# Patient Record
Sex: MALE | Race: WHITE | Employment: FULL TIME | URBAN - METROPOLITAN AREA
[De-identification: names, ages, dates, MRNs, and addresses within clinical notes are randomized per-mention and may not be internally consistent; named-entity substitution may affect disease eponyms.]

---

## 2017-01-11 ENCOUNTER — OFFICE VISIT (OUTPATIENT)
Dept: INTERNAL MEDICINE CLINIC | Age: 62
End: 2017-01-11

## 2017-01-11 VITALS
HEIGHT: 74 IN | TEMPERATURE: 98.1 F | SYSTOLIC BLOOD PRESSURE: 136 MMHG | OXYGEN SATURATION: 97 % | DIASTOLIC BLOOD PRESSURE: 76 MMHG | WEIGHT: 218 LBS | BODY MASS INDEX: 27.98 KG/M2 | HEART RATE: 116 BPM

## 2017-01-11 DIAGNOSIS — J01.90 ACUTE NON-RECURRENT SINUSITIS, UNSPECIFIED LOCATION: Primary | ICD-10-CM

## 2017-01-11 DIAGNOSIS — I10 ESSENTIAL HYPERTENSION: ICD-10-CM

## 2017-01-11 DIAGNOSIS — J20.9 BRONCHITIS WITH BRONCHOSPASM: ICD-10-CM

## 2017-01-11 DIAGNOSIS — E11.9 TYPE 2 DIABETES MELLITUS WITHOUT COMPLICATION, WITHOUT LONG-TERM CURRENT USE OF INSULIN (HCC): ICD-10-CM

## 2017-01-11 PROCEDURE — 99214 OFFICE O/P EST MOD 30 MIN: CPT | Performed by: NURSE PRACTITIONER

## 2017-01-11 RX ORDER — PREDNISONE 10 MG/1
TABLET ORAL
Qty: 39 TABLET | Refills: 0 | Status: SHIPPED | OUTPATIENT
Start: 2017-01-11 | End: 2018-01-02 | Stop reason: ALTCHOICE

## 2017-01-11 RX ORDER — AMOXICILLIN AND CLAVULANATE POTASSIUM 875; 125 MG/1; MG/1
1 TABLET, FILM COATED ORAL 2 TIMES DAILY
Qty: 20 TABLET | Refills: 0 | Status: SHIPPED | OUTPATIENT
Start: 2017-01-11 | End: 2017-01-21

## 2017-01-11 RX ORDER — ALBUTEROL SULFATE 90 UG/1
2 AEROSOL, METERED RESPIRATORY (INHALATION) EVERY 6 HOURS PRN
Qty: 1 INHALER | Refills: 3 | Status: SHIPPED | OUTPATIENT
Start: 2017-01-11 | End: 2019-03-26 | Stop reason: ALTCHOICE

## 2017-01-11 RX ORDER — GLIPIZIDE 5 MG/1
10 TABLET ORAL DAILY
COMMUNITY
Start: 2014-07-14 | End: 2022-05-16 | Stop reason: SDUPTHER

## 2017-01-11 ASSESSMENT — ENCOUNTER SYMPTOMS
COLOR CHANGE: 0
RHINORRHEA: 1
SHORTNESS OF BREATH: 0
CONSTIPATION: 0
COUGH: 1
WHEEZING: 1
EYES NEGATIVE: 1
SORE THROAT: 0
BLOOD IN STOOL: 0
DIARRHEA: 0
BACK PAIN: 0

## 2017-01-16 ENCOUNTER — HOSPITAL ENCOUNTER (OUTPATIENT)
Dept: OTHER | Age: 62
Discharge: HOME | End: 2017-01-16
Attending: INTERNAL MEDICINE | Admitting: INTERNAL MEDICINE

## 2017-01-16 ENCOUNTER — TELEPHONE (OUTPATIENT)
Dept: INTERNAL MEDICINE CLINIC | Age: 62
End: 2017-01-16

## 2017-01-16 ENCOUNTER — OFFICE VISIT (OUTPATIENT)
Dept: INTERNAL MEDICINE CLINIC | Age: 62
End: 2017-01-16

## 2017-01-16 VITALS
OXYGEN SATURATION: 98 % | DIASTOLIC BLOOD PRESSURE: 70 MMHG | WEIGHT: 218 LBS | BODY MASS INDEX: 27.98 KG/M2 | SYSTOLIC BLOOD PRESSURE: 144 MMHG | HEART RATE: 127 BPM | HEIGHT: 74 IN | TEMPERATURE: 98.1 F

## 2017-01-16 DIAGNOSIS — R05.9 COUGH: ICD-10-CM

## 2017-01-16 DIAGNOSIS — I10 ESSENTIAL HYPERTENSION: ICD-10-CM

## 2017-01-16 DIAGNOSIS — J20.9 ACUTE BRONCHITIS, UNSPECIFIED ORGANISM: ICD-10-CM

## 2017-01-16 DIAGNOSIS — R05.9 COUGH: Primary | ICD-10-CM

## 2017-01-16 DIAGNOSIS — E11.9 TYPE 2 DIABETES MELLITUS WITHOUT COMPLICATION, WITHOUT LONG-TERM CURRENT USE OF INSULIN (HCC): ICD-10-CM

## 2017-01-16 PROCEDURE — 99213 OFFICE O/P EST LOW 20 MIN: CPT | Performed by: INTERNAL MEDICINE

## 2017-01-16 RX ORDER — GUAIFENESIN AND CODEINE PHOSPHATE 100; 10 MG/5ML; MG/5ML
5 SOLUTION ORAL 3 TIMES DAILY PRN
Qty: 120 BOTTLE | Refills: 0 | Status: SHIPPED | OUTPATIENT
Start: 2017-01-16 | End: 2020-05-01 | Stop reason: CLARIF

## 2017-01-16 RX ORDER — LEVOFLOXACIN 750 MG/1
750 TABLET ORAL DAILY
Qty: 10 TABLET | Refills: 0 | Status: SHIPPED | OUTPATIENT
Start: 2017-01-16 | End: 2017-01-26

## 2017-10-02 LAB
AVERAGE GLUCOSE: NORMAL
HBA1C MFR BLD: 10.6 %

## 2017-11-29 ENCOUNTER — TELEPHONE (OUTPATIENT)
Dept: INTERNAL MEDICINE CLINIC | Age: 62
End: 2017-11-29

## 2018-01-02 ENCOUNTER — OFFICE VISIT (OUTPATIENT)
Dept: INTERNAL MEDICINE CLINIC | Age: 63
End: 2018-01-02

## 2018-01-02 VITALS
SYSTOLIC BLOOD PRESSURE: 124 MMHG | TEMPERATURE: 98.5 F | DIASTOLIC BLOOD PRESSURE: 82 MMHG | HEART RATE: 114 BPM | BODY MASS INDEX: 27.48 KG/M2 | WEIGHT: 214 LBS | OXYGEN SATURATION: 98 %

## 2018-01-02 DIAGNOSIS — J02.8 PHARYNGITIS DUE TO OTHER ORGANISM: Primary | ICD-10-CM

## 2018-01-02 DIAGNOSIS — I10 ESSENTIAL HYPERTENSION: ICD-10-CM

## 2018-01-02 DIAGNOSIS — E11.9 TYPE 2 DIABETES MELLITUS WITHOUT COMPLICATION, WITHOUT LONG-TERM CURRENT USE OF INSULIN (HCC): ICD-10-CM

## 2018-01-02 DIAGNOSIS — E78.5 HYPERLIPIDEMIA, UNSPECIFIED HYPERLIPIDEMIA TYPE: ICD-10-CM

## 2018-01-02 PROCEDURE — 99213 OFFICE O/P EST LOW 20 MIN: CPT | Performed by: INTERNAL MEDICINE

## 2018-01-02 RX ORDER — ATORVASTATIN CALCIUM 20 MG/1
TABLET, FILM COATED ORAL
COMMUNITY
Start: 2017-10-05 | End: 2022-05-16 | Stop reason: SDUPTHER

## 2018-01-02 RX ORDER — AZITHROMYCIN 250 MG/1
TABLET, FILM COATED ORAL
Qty: 1 PACKET | Refills: 0 | Status: SHIPPED | OUTPATIENT
Start: 2018-01-02 | End: 2018-01-12

## 2018-01-02 ASSESSMENT — ENCOUNTER SYMPTOMS
VOMITING: 0
CHEST TIGHTNESS: 0
CONSTIPATION: 0
ABDOMINAL DISTENTION: 0
SHORTNESS OF BREATH: 0
SINUS PRESSURE: 0
BACK PAIN: 0
SINUS PAIN: 0
WHEEZING: 0
SORE THROAT: 1
COUGH: 0
NAUSEA: 0
DIARRHEA: 0

## 2018-01-02 ASSESSMENT — PATIENT HEALTH QUESTIONNAIRE - PHQ9
SUM OF ALL RESPONSES TO PHQ QUESTIONS 1-9: 0
1. LITTLE INTEREST OR PLEASURE IN DOING THINGS: 0
2. FEELING DOWN, DEPRESSED OR HOPELESS: 0
SUM OF ALL RESPONSES TO PHQ9 QUESTIONS 1 & 2: 0

## 2018-01-02 NOTE — PROGRESS NOTES
Subjective:      Patient ID: Luis Dunlap is a 63 y.o. male. HPI    Here with c/o 2 week h/o sore throat and chest congestion. Pt also has a h/o dm, hyperlipidemia and htn. No relief with sore throat after taking apap. Blood sugars and lipids poorly controlled. Being followed by endocrine. htn  Has improved since starting bp medication    Review of Systems   Constitutional: Negative for fever and unexpected weight change. HENT: Positive for congestion and sore throat. Negative for sinus pain, sinus pressure and sneezing. Respiratory: Negative for cough, chest tightness, shortness of breath and wheezing. Cardiovascular: Negative for chest pain, palpitations and leg swelling. Gastrointestinal: Negative for abdominal distention, constipation, diarrhea, nausea and vomiting. Musculoskeletal: Negative for arthralgias, back pain and myalgias. Neurological: Negative for tremors and numbness. All other systems reviewed and are negative. Objective:   Physical Exam   Constitutional: He is oriented to person, place, and time. He appears well-developed and well-nourished. No distress. HENT:   Right Ear: External ear normal.   Left Ear: External ear normal.   Nose: Nose normal.   Mouth/Throat: Oropharynx is clear and moist. No oropharyngeal exudate. Neck: Neck supple. No thyromegaly present. Cardiovascular: Normal rate, regular rhythm, normal heart sounds and intact distal pulses. Exam reveals no gallop and no friction rub. No murmur heard. Pulmonary/Chest: Effort normal and breath sounds normal. No respiratory distress. He has no wheezes. He has no rales. He exhibits no tenderness. Abdominal: Soft. He exhibits no distension and no mass. There is no tenderness. There is no rebound and no guarding. Musculoskeletal: He exhibits no edema. Neurological: He is alert and oriented to person, place, and time. Skin: He is not diaphoretic. Vitals reviewed.       Assessment:     Latrell Georges was

## 2018-02-26 ENCOUNTER — OFFICE VISIT (OUTPATIENT)
Dept: INTERNAL MEDICINE CLINIC | Age: 63
End: 2018-02-26

## 2018-02-26 VITALS
BODY MASS INDEX: 27.73 KG/M2 | HEART RATE: 110 BPM | OXYGEN SATURATION: 98 % | DIASTOLIC BLOOD PRESSURE: 88 MMHG | WEIGHT: 216 LBS | SYSTOLIC BLOOD PRESSURE: 138 MMHG

## 2018-02-26 DIAGNOSIS — E78.5 HYPERLIPIDEMIA, UNSPECIFIED HYPERLIPIDEMIA TYPE: ICD-10-CM

## 2018-02-26 DIAGNOSIS — I10 ESSENTIAL HYPERTENSION: ICD-10-CM

## 2018-02-26 DIAGNOSIS — Z23 NEED FOR PROPHYLACTIC VACCINATION AGAINST STREPTOCOCCUS PNEUMONIAE (PNEUMOCOCCUS): ICD-10-CM

## 2018-02-26 DIAGNOSIS — L05.91 CYST NEAR TAILBONE: Primary | ICD-10-CM

## 2018-02-26 DIAGNOSIS — E11.9 TYPE 2 DIABETES MELLITUS WITHOUT COMPLICATION, WITHOUT LONG-TERM CURRENT USE OF INSULIN (HCC): ICD-10-CM

## 2018-02-26 PROCEDURE — 99214 OFFICE O/P EST MOD 30 MIN: CPT | Performed by: INTERNAL MEDICINE

## 2018-02-26 RX ORDER — SULFAMETHOXAZOLE AND TRIMETHOPRIM 800; 160 MG/1; MG/1
1 TABLET ORAL 2 TIMES DAILY
Qty: 20 TABLET | Refills: 0 | Status: SHIPPED | OUTPATIENT
Start: 2018-02-26 | End: 2018-03-08

## 2018-02-26 ASSESSMENT — ENCOUNTER SYMPTOMS
COUGH: 0
VOMITING: 0
SHORTNESS OF BREATH: 0
CHEST TIGHTNESS: 0
ABDOMINAL DISTENTION: 0
NAUSEA: 0
WHEEZING: 0
CONSTIPATION: 0
BACK PAIN: 0
DIARRHEA: 0

## 2018-02-26 NOTE — PROGRESS NOTES
pain.    Diagnoses and all orders for this visit:    Cyst near tailbone  Warm, moist compressed 3 times daily for 15 minutes. Discussed use, benefit, and side effects of prescribed medications. Barriers to compliance discussed. All patient questions answered. Pt voiced understanding. Consider surgery referral  -     sulfamethoxazole-trimethoprim (BACTRIM DS) 800-160 MG per tablet; Take 1 tablet by mouth 2 times daily for 10 days    Type 2 diabetes mellitus without complication, without long-term current use of insulin (HCC)  Poorly controlled. Will obtain labs  -     Hemoglobin A1C    Essential hypertension  Stable. Continue current regimen    -     Comprehensive Metabolic Panel    Hyperlipidemia, unspecified hyperlipidemia type  Elevated last check.  Continue current regimen    -     Lipid Panel  -     Comprehensive Metabolic Panel        Plan:      See above plan

## 2019-01-07 LAB
AVERAGE GLUCOSE: NORMAL
HBA1C MFR BLD: 9.5 %

## 2019-01-09 ENCOUNTER — OFFICE VISIT (OUTPATIENT)
Dept: INTERNAL MEDICINE CLINIC | Age: 64
End: 2019-01-09
Payer: COMMERCIAL

## 2019-01-09 VITALS
OXYGEN SATURATION: 98 % | DIASTOLIC BLOOD PRESSURE: 66 MMHG | HEIGHT: 74 IN | WEIGHT: 210 LBS | TEMPERATURE: 97.9 F | SYSTOLIC BLOOD PRESSURE: 126 MMHG | BODY MASS INDEX: 26.95 KG/M2 | HEART RATE: 114 BPM

## 2019-01-09 DIAGNOSIS — K52.9 GASTROENTERITIS: ICD-10-CM

## 2019-01-09 DIAGNOSIS — R42 DIZZINESS: ICD-10-CM

## 2019-01-09 DIAGNOSIS — J01.10 ACUTE FRONTAL SINUSITIS, RECURRENCE NOT SPECIFIED: Primary | ICD-10-CM

## 2019-01-09 DIAGNOSIS — R11.0 NAUSEA: ICD-10-CM

## 2019-01-09 DIAGNOSIS — R19.7 DIARRHEA, UNSPECIFIED TYPE: ICD-10-CM

## 2019-01-09 DIAGNOSIS — R52 BODY ACHES: ICD-10-CM

## 2019-01-09 DIAGNOSIS — R50.9 FEVER CHILLS: ICD-10-CM

## 2019-01-09 LAB
INFLUENZA A ANTIBODY: NORMAL
INFLUENZA B ANTIBODY: NORMAL

## 2019-01-09 PROCEDURE — 87804 INFLUENZA ASSAY W/OPTIC: CPT | Performed by: NURSE PRACTITIONER

## 2019-01-09 PROCEDURE — 99213 OFFICE O/P EST LOW 20 MIN: CPT | Performed by: NURSE PRACTITIONER

## 2019-01-09 RX ORDER — ONDANSETRON 4 MG/1
4 TABLET, FILM COATED ORAL 3 TIMES DAILY PRN
Qty: 30 TABLET | Refills: 0 | Status: ON HOLD
Start: 2019-01-09 | End: 2020-05-10 | Stop reason: HOSPADM

## 2019-01-09 RX ORDER — AMOXICILLIN AND CLAVULANATE POTASSIUM 875; 125 MG/1; MG/1
1 TABLET, FILM COATED ORAL 2 TIMES DAILY
Qty: 20 TABLET | Refills: 0 | Status: SHIPPED | OUTPATIENT
Start: 2019-01-09 | End: 2019-01-19

## 2019-01-09 ASSESSMENT — ENCOUNTER SYMPTOMS
DIARRHEA: 1
BLOOD IN STOOL: 0
WHEEZING: 1
ABDOMINAL PAIN: 1
SHORTNESS OF BREATH: 0
NAUSEA: 1
CONSTIPATION: 0
VOMITING: 0
COUGH: 1

## 2019-01-09 ASSESSMENT — PATIENT HEALTH QUESTIONNAIRE - PHQ9
SUM OF ALL RESPONSES TO PHQ QUESTIONS 1-9: 0
SUM OF ALL RESPONSES TO PHQ9 QUESTIONS 1 & 2: 0
1. LITTLE INTEREST OR PLEASURE IN DOING THINGS: 0
2. FEELING DOWN, DEPRESSED OR HOPELESS: 0
SUM OF ALL RESPONSES TO PHQ QUESTIONS 1-9: 0

## 2019-03-15 ENCOUNTER — HOSPITAL ENCOUNTER (OUTPATIENT)
Dept: PHYSICAL THERAPY | Age: 64
Setting detail: THERAPIES SERIES
Discharge: HOME | End: 2019-03-15
Payer: COMMERCIAL

## 2019-03-15 PROCEDURE — 97110 THERAPEUTIC EXERCISES: CPT

## 2019-03-15 PROCEDURE — 97140 MANUAL THERAPY 1/> REGIONS: CPT

## 2019-03-15 PROCEDURE — 97162 PT EVAL MOD COMPLEX 30 MIN: CPT

## 2019-03-18 ENCOUNTER — APPOINTMENT (OUTPATIENT)
Dept: PHYSICAL THERAPY | Age: 64
End: 2019-03-18
Payer: COMMERCIAL

## 2019-03-20 ENCOUNTER — HOSPITAL ENCOUNTER (OUTPATIENT)
Dept: PHYSICAL THERAPY | Age: 64
Setting detail: THERAPIES SERIES
Discharge: HOME | End: 2019-03-20
Payer: COMMERCIAL

## 2019-03-20 PROCEDURE — 97140 MANUAL THERAPY 1/> REGIONS: CPT

## 2019-03-20 PROCEDURE — 97110 THERAPEUTIC EXERCISES: CPT

## 2019-03-22 ENCOUNTER — HOSPITAL ENCOUNTER (OUTPATIENT)
Dept: PHYSICAL THERAPY | Age: 64
Setting detail: THERAPIES SERIES
Discharge: HOME | End: 2019-03-22
Payer: COMMERCIAL

## 2019-03-22 PROCEDURE — 97140 MANUAL THERAPY 1/> REGIONS: CPT

## 2019-03-25 ENCOUNTER — HOSPITAL ENCOUNTER (OUTPATIENT)
Dept: PHYSICAL THERAPY | Age: 64
Setting detail: THERAPIES SERIES
Discharge: HOME | End: 2019-03-25
Payer: COMMERCIAL

## 2019-03-25 PROCEDURE — 97140 MANUAL THERAPY 1/> REGIONS: CPT

## 2019-03-25 PROCEDURE — G0283 ELEC STIM OTHER THAN WOUND: HCPCS

## 2019-03-26 ENCOUNTER — OFFICE VISIT (OUTPATIENT)
Dept: INTERNAL MEDICINE CLINIC | Age: 64
End: 2019-03-26
Payer: COMMERCIAL

## 2019-03-26 VITALS
DIASTOLIC BLOOD PRESSURE: 78 MMHG | SYSTOLIC BLOOD PRESSURE: 142 MMHG | HEART RATE: 112 BPM | OXYGEN SATURATION: 98 % | WEIGHT: 217 LBS | BODY MASS INDEX: 27.86 KG/M2

## 2019-03-26 DIAGNOSIS — R00.0 TACHYCARDIA: Primary | ICD-10-CM

## 2019-03-26 DIAGNOSIS — I10 ESSENTIAL HYPERTENSION: ICD-10-CM

## 2019-03-26 PROCEDURE — 99213 OFFICE O/P EST LOW 20 MIN: CPT | Performed by: INTERNAL MEDICINE

## 2019-03-26 ASSESSMENT — ENCOUNTER SYMPTOMS
DIARRHEA: 0
COUGH: 0
BACK PAIN: 0
NAUSEA: 0
VOMITING: 0
ABDOMINAL DISTENTION: 0
SHORTNESS OF BREATH: 0
WHEEZING: 0
CHEST TIGHTNESS: 0
CONSTIPATION: 0

## 2019-03-27 ENCOUNTER — APPOINTMENT (OUTPATIENT)
Dept: PHYSICAL THERAPY | Age: 64
End: 2019-03-27
Payer: COMMERCIAL

## 2019-03-29 ENCOUNTER — HOSPITAL ENCOUNTER (OUTPATIENT)
Dept: PHYSICAL THERAPY | Age: 64
Setting detail: THERAPIES SERIES
Discharge: HOME | End: 2019-03-29
Payer: COMMERCIAL

## 2019-03-29 PROCEDURE — 97140 MANUAL THERAPY 1/> REGIONS: CPT

## 2019-04-01 ENCOUNTER — APPOINTMENT (OUTPATIENT)
Dept: PHYSICAL THERAPY | Age: 64
End: 2019-04-01
Payer: COMMERCIAL

## 2019-04-03 ENCOUNTER — HOSPITAL ENCOUNTER (OUTPATIENT)
Dept: PHYSICAL THERAPY | Age: 64
Setting detail: THERAPIES SERIES
Discharge: HOME | End: 2019-04-03
Payer: COMMERCIAL

## 2019-04-03 NOTE — FLOWSHEET NOTE
Physical Therapy  Cancellation/No-show Note  Patient Name:  Clare Caballero  :  1955   Date:  4/3/2019  Cancels to Date: 1  No-shows to Date: 0    For today's appointment patient:  [x]  Cancelled  []  Rescheduled appointment  []  No-show     Reason given by patient:  []  Patient ill  []  Conflicting appointment  []  No transportation    []  Conflict with work  []  No reason given  []  Other:     Comments:      Electronically signed by:  Penny Kwan, [id number]

## 2019-04-05 ENCOUNTER — HOSPITAL ENCOUNTER (OUTPATIENT)
Dept: PHYSICAL THERAPY | Age: 64
Setting detail: THERAPIES SERIES
Discharge: HOME | End: 2019-04-05
Payer: COMMERCIAL

## 2019-04-05 PROCEDURE — 97140 MANUAL THERAPY 1/> REGIONS: CPT
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
## 2019-04-15 ENCOUNTER — HOSPITAL ENCOUNTER (OUTPATIENT)
Dept: PHYSICAL THERAPY | Age: 64
Setting detail: THERAPIES SERIES
Discharge: HOME | End: 2019-04-15
Payer: COMMERCIAL

## 2019-04-15 PROCEDURE — 97140 MANUAL THERAPY 1/> REGIONS: CPT
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
## 2019-04-17 ENCOUNTER — HOSPITAL ENCOUNTER (OUTPATIENT)
Dept: PHYSICAL THERAPY | Age: 64
Setting detail: THERAPIES SERIES
Discharge: HOME | End: 2019-04-17
Payer: COMMERCIAL

## 2019-04-17 PROCEDURE — 97140 MANUAL THERAPY 1/> REGIONS: CPT

## 2019-04-22 ENCOUNTER — HOSPITAL ENCOUNTER (OUTPATIENT)
Dept: PHYSICAL THERAPY | Age: 64
Setting detail: THERAPIES SERIES
Discharge: HOME | End: 2019-04-22
Payer: COMMERCIAL

## 2019-04-22 PROCEDURE — 97140 MANUAL THERAPY 1/> REGIONS: CPT

## 2019-04-22 NOTE — PROGRESS NOTES
Outpatient Physical Therapy     [x] Daily Treatment Note   [x] Progress Note  19  [] Discharge Note      Date:  2019    Patient Name:  Heri Schmidt        :  1955    Restrictions/Precautions:  MUST USE NECK BRACE    Diagnosis:  fx clavicle    Treatment Diagnosis:  Same. also has FX ,of C1 and C2.    fx of occ condyle. fx ribs 3-10. Had a collapsed lung    Insurance/Certification information:  BWC 2-3x/wk for 4 wks 19-19    Referring Physician:  Romeo Sosa MD   ()     Plan of care signed (Y/N):      Visit# / total visits:     MD appt 19    Pain level: today 5/10, up to 8/10      Subjective: 19 having left clavicular pain, pain in L ribs, pain at inf tip of scapula  19 reports collar bone is not hurting as bad   4/15/19  Reports he continues to have popping, cracking. Reports neck MD (Dr. Aleisha Koenig) wants him to wear neck brace for another month and will want him to follow up with vascular MD before d/cing neck brace (there is a flap on the artery)  19  Reports the shoulder doctor instructed pt to tell us to use heat on him prior to treatment to see if it helps with the amount of pain he has. Reports MD thinks he is behind about a month and it is not healing. Reports his doctor does not want to plate the fx. Reports he sees neck doctor on 19. Will be having a CT scan for abdomin and for the lump on his back  3/29/19 reports he feels his rom for flexion is getting worse. Getting more popping and pain in spine and ribs, burning medial biceps. Reports he cannot do pendulum ex due to pain. Reports he is also starting to get dizzy over the last wk or two.     3/25/19  Reports feeling worse today with the rainy weather. 3/22/19 doing the same. Severe pain with ribs,shoulder, neck,back. Not sleeping with pain.     3/20/19Reports last night his ribs were very very painful with popping for two wks  3/15/19 injured evidence of atlantoaxial instability. Moderate C5-6 and C6-7 disc degenerative changes. VK/pji     Pain    Patient describes pain to be constnat left shoulder pain uip into his upper trap and neck. Has pain into his upper chest, then slants down across his fx ribs both ant and post.  Patient reports  7/10 pain at rest,  10/10 pain with movement. Worsened by  -  Getting dressed/undressed, walking, lying still for no reason. Improved by - pain  Current Functional Limitations:  Not doing much  PLOF:  Fully indep  Pt. unable to tolerate laying on side of pain, fixing hair, reaching overhead, washing opposite shoulder, nor tucking in shirt due to pain.     Patient goal for therapy: move his arm a little and pain relief.          Exercises:   Exercise/Equipment Resistance/Repetitions Other comments          PROM L shoulders  23'     table slides flex   1-3 x 10     pendulum hang if tolerated. 2-3x10-30\" as tolerated Will defer due to c/o                                                                                                          Therapeutic Exercise:   min   MAY TRY TA, TA WITH MARCH, STANDING HIP ABD WITH TA, MAYBE SIDELYING OR SEATED VERY GENTLE SCAP MOBILITY    Group Therapy:      Home Exercise Program:      Therapeutic Activity:      Neuromuscular Re-education:      Gait:      Manual Therapy:  40  min    Tolerated better today (left hp on traps/ribs while ranging shoulder also)    Very limited due to pain   [address] [facility] JT MOB. Scapular mobiliz in sidelying with rib pillow to push on, light costotransv jt mobiliz in sidelying. STM to upper arm  PROM supine   IR [email or web address],  ER 12* cramp,  ext 10,   abd 55   Flex 47       Modalities: 15' after rx.         Timed Code Treatment Minutes:   40    Total Treatment Minutes:     40+HP    3man     [id number]    Medicare Cap Total YTD:      Treatment/Activity Tolerance:  Nothing really helped today  [] Patient tolerated treatment well [] Patient limited by fatigue   [x] Patient limited by pain [] Patient limited by other medical complications   [x] Other: multiple fx. Prognosis: [x] Good [x] Fair  [] Poor    Patient Requires Follow-up:  [x] Yes  [] No    Plan: [x] Continue per plan of care [] Alter current plan (see comments)   [] Plan of care initiated [] Hold pending MD visit [] Discharge    Plan for Next Session: PROM, advance ex     See Progress Note: [] Yes  [x] No       Next due: For MD   Electronically signed by:  Gris Kelley, PT [id number]    Outpatient Physical Therapy  Progress Note        Progress Note covers period from:    3/15/19-3/19/19- 4/22/19         Objective:  Observation:  Test measurements:     C/o popping and grinding of ribs with some of the motions especially ER and popping with IR  IR [email or web address],  ER  20,  ext 10,   abd 50 , Flex  50    Functional Outcome Measure:  Measure used:  SPADI 3/15/19       4/22/109 NT  Score:  119  % Disability:  90%          Assessment:  Summary: pt has been seen for 8 visits   Patient's response to treatment: limited due to pain, burning, popping c/o. Making slow gains. Goals:GOALS  GCode:    Short Term Goals ( 2   weeks) Long Term Goals (  6-8 weeks)   1). establis HEP 1). (I) HEP   2). teach PROM to spouse 2). advance program to AROM and strengthening as shoulder permits   3). 3).   4). 4).          · Progress toward previous goals:  STG met  Working toward LTG     Plan:  · Continue PT for monica mcknight     Electronically Signed by: Gris Kelley, [address] [place] [address]

## 2019-04-24 ENCOUNTER — HOSPITAL ENCOUNTER (OUTPATIENT)
Dept: PHYSICAL THERAPY | Age: 64
Setting detail: THERAPIES SERIES
Discharge: HOME | End: 2019-04-24
Payer: COMMERCIAL

## 2019-04-24 PROCEDURE — 97140 MANUAL THERAPY 1/> REGIONS: CPT

## 2019-04-26 ENCOUNTER — HOSPITAL ENCOUNTER (OUTPATIENT)
Dept: PHYSICAL THERAPY | Age: 64
Setting detail: THERAPIES SERIES
Discharge: HOME | End: 2019-04-26
Payer: COMMERCIAL

## 2019-04-26 PROCEDURE — 97110 THERAPEUTIC EXERCISES: CPT

## 2019-04-26 PROCEDURE — 97140 MANUAL THERAPY 1/> REGIONS: CPT
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
## 2019-04-29 ENCOUNTER — HOSPITAL ENCOUNTER (OUTPATIENT)
Dept: PHYSICAL THERAPY | Age: 64
Setting detail: THERAPIES SERIES
Discharge: HOME | End: 2019-04-29
Payer: COMMERCIAL

## 2019-04-29 PROCEDURE — 97140 MANUAL THERAPY 1/> REGIONS: CPT

## 2019-04-29 NOTE — FLOWSHEET NOTE
Outpatient Physical Therapy     [x] Daily Treatment Note   [] Progress Note  19  [] Discharge Note      Date:  2019    Patient Name:  Anda Schaumann        :  1955    Restrictions/Precautions:  MUST USE NECK BRACE    Diagnosis:  fx clavicle    Treatment Diagnosis:  Same. also has FX ,of C1 and C2.    fx of occ condyle. fx ribs 3-10. Had a collapsed lung    Insurance/Certification information:  BWC 2-3x/wk for 4 wks 19-19    Referring Physician:  Hugo Pappas MD   ()     Plan of care signed (Y/N):      Visit# / total visits:     MD appt 19    Pain level: today 5/10, up to 8/10      Subjective: 19 reports he felt better the rest of the day after last visit, will be going for carotid US next wk   19 had some relief after his last visit for the rest of that day  19 had some relief after his last visit. 19 having left clavicular pain, pain in L ribs, pain at inf tip of scapula  19 reports collar bone is not hurting as bad   4/15/19  Reports he continues to have popping, cracking. Reports neck MD (Dr. Tess Gallegos) wants him to wear neck brace for another month and will want him to follow up with vascular MD before d/cing neck brace (there is a flap on the artery)  19  Reports the shoulder doctor instructed pt to tell us to use heat on him prior to treatment to see if it helps with the amount of pain he has. Reports MD thinks he is behind about a month and it is not healing. Reports his doctor does not want to plate the fx. Reports he sees neck doctor on 19. Will be having a CT scan for abdomin and for the lump on his back  3/29/19 reports he feels his rom for flexion is getting worse. Getting more popping and pain in spine and ribs, burning medial biceps. Reports he cannot do pendulum ex due to pain.    Reports he is also starting to get dizzy over the last wk or two.     3/25/19  Reports feeling worse Treatment Minutes:   33    Total Treatment Minutes:      33    2man        [id number]    Medicare Cap Total YTD:      Treatment/Activity Tolerance:  Nothing really helped today  [] Patient tolerated treatment well [] Patient limited by fatigue   [x] Patient limited by pain [] Patient limited by other medical complications   [x] Other: multiple fx. Prognosis: [x] Good [x] Fair  [] Poor    Patient Requires Follow-up:  [x] Yes  [] No    Plan: [x] Continue per plan of care [] Alter current plan (see comments)   [] Plan of care initiated [] Hold pending MD visit [] Discharge    Plan for Next Session: PROM, advance ex     See Progress Note: [] Yes  [x] No       Next due: For MD   Electronically signed by:  Aldair Min, PT [id number]    Outpatient Physical Therapy  Progress Note        Progress Note covers period from:    3/15/19-3/19/19- 4/22/19         Objective:  Observation:  Test measurements:     C/o popping and grinding of ribs with some of the motions especially ER and popping with IR  IR [email or web address],  ER  20,  ext 10,   abd 50 , Flex  50    Functional Outcome Measure:  Measure used:  SPADI 3/15/19       4/22/109 NT  Score:  119  % Disability:  90%          Assessment:  Summary: pt has been seen for 8 visits   Patient's response to treatment: limited due to pain, burning, popping c/o. Making slow gains. Goals:GOALS  GCode:    Short Term Goals ( 2   weeks) Long Term Goals (  6-8 weeks)   1). establis HEP 1). (I) HEP   2). teach PROM to spouse 2). advance program to AROM and strengthening as shoulder permits   3). 3).   4). 4).          · Progress toward previous goals:  STG met  Working toward LTG     Plan:  · Continue PT for monica mcknight     Electronically Signed by: Aldair Min, [address] [place] [address]

## 2019-05-01 ENCOUNTER — HOSPITAL ENCOUNTER (OUTPATIENT)
Dept: PHYSICAL THERAPY | Age: 64
Setting detail: THERAPIES SERIES
Discharge: HOME | End: 2019-05-01
Payer: COMMERCIAL

## 2019-05-01 PROCEDURE — 97140 MANUAL THERAPY 1/> REGIONS: CPT
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
## 2019-05-03 ENCOUNTER — APPOINTMENT (OUTPATIENT)
Dept: PHYSICAL THERAPY | Age: 64
End: 2019-05-03
Payer: COMMERCIAL
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
## 2019-05-20 ENCOUNTER — HOSPITAL ENCOUNTER (OUTPATIENT)
Dept: PHYSICAL THERAPY | Age: 64
Setting detail: THERAPIES SERIES
Discharge: HOME | End: 2019-05-20
Payer: COMMERCIAL

## 2019-05-20 PROCEDURE — 97140 MANUAL THERAPY 1/> REGIONS: CPT

## 2019-05-20 NOTE — PROGRESS NOTES
[employer] comp submitted approval from 5/15 for 7 weeks to complete, order written 3 x a week for six weeks.   Last visit scheduled   7/1/19
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
## 2019-05-22 ENCOUNTER — HOSPITAL ENCOUNTER (OUTPATIENT)
Dept: PHYSICAL THERAPY | Age: 64
Setting detail: THERAPIES SERIES
Discharge: HOME | End: 2019-05-22
Payer: COMMERCIAL

## 2019-05-22 NOTE — FLOWSHEET NOTE
Physical Therapy  Cancellation/No-show Note  Patient Name:  Izabela Caballero  :  1955   Date:  2019  Cancels to Date: 2  No-shows to Date: 0    For today's appointment patient:  [x]  Cancelled  []  Rescheduled appointment  []  No-show     Reason given by patient:  [x]  Patient ill  []  Conflicting appointment  []  No transportation    []  Conflict with work  []  No reason given  []  Other:     Comments:      Electronically signed bySUSAN [id number]

## 2019-06-03 ENCOUNTER — HOSPITAL ENCOUNTER (OUTPATIENT)
Dept: PHYSICAL THERAPY | Age: 64
Setting detail: THERAPIES SERIES
Discharge: HOME | End: 2019-06-03
Payer: COMMERCIAL

## 2019-06-03 PROCEDURE — 97110 THERAPEUTIC EXERCISES: CPT

## 2019-06-03 PROCEDURE — 97140 MANUAL THERAPY 1/> REGIONS: CPT
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
## 2019-06-05 ENCOUNTER — HOSPITAL ENCOUNTER (OUTPATIENT)
Dept: PHYSICAL THERAPY | Age: 64
Setting detail: THERAPIES SERIES
Discharge: HOME | End: 2019-06-05
Payer: COMMERCIAL

## 2019-06-05 PROCEDURE — 97140 MANUAL THERAPY 1/> REGIONS: CPT
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
## 2019-06-07 ENCOUNTER — HOSPITAL ENCOUNTER (OUTPATIENT)
Dept: PHYSICAL THERAPY | Age: 64
Setting detail: THERAPIES SERIES
Discharge: HOME | End: 2019-06-07
Payer: COMMERCIAL

## 2019-06-07 PROCEDURE — 97110 THERAPEUTIC EXERCISES: CPT

## 2019-06-07 PROCEDURE — 97140 MANUAL THERAPY 1/> REGIONS: CPT
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
## 2019-06-10 ENCOUNTER — APPOINTMENT (OUTPATIENT)
Dept: PHYSICAL THERAPY | Age: 64
End: 2019-06-10
Payer: COMMERCIAL

## 2019-06-12 ENCOUNTER — HOSPITAL ENCOUNTER (OUTPATIENT)
Dept: PHYSICAL THERAPY | Age: 64
Setting detail: THERAPIES SERIES
Discharge: HOME | End: 2019-06-12
Payer: COMMERCIAL

## 2019-06-12 PROCEDURE — 97110 THERAPEUTIC EXERCISES: CPT

## 2019-06-12 PROCEDURE — 97140 MANUAL THERAPY 1/> REGIONS: CPT
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
## 2019-06-14 ENCOUNTER — HOSPITAL ENCOUNTER (OUTPATIENT)
Dept: PHYSICAL THERAPY | Age: 64
Setting detail: THERAPIES SERIES
Discharge: HOME | End: 2019-06-14
Payer: COMMERCIAL

## 2019-06-14 PROCEDURE — 97110 THERAPEUTIC EXERCISES: CPT

## 2019-06-14 PROCEDURE — 97140 MANUAL THERAPY 1/> REGIONS: CPT
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
## 2019-06-17 ENCOUNTER — HOSPITAL ENCOUNTER (OUTPATIENT)
Dept: PHYSICAL THERAPY | Age: 64
Setting detail: THERAPIES SERIES
Discharge: HOME | End: 2019-06-17
Payer: COMMERCIAL

## 2019-06-17 PROCEDURE — 97140 MANUAL THERAPY 1/> REGIONS: CPT

## 2019-06-17 PROCEDURE — 97110 THERAPEUTIC EXERCISES: CPT
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
## 2019-06-21 ENCOUNTER — HOSPITAL ENCOUNTER (OUTPATIENT)
Dept: PHYSICAL THERAPY | Age: 64
Setting detail: THERAPIES SERIES
Discharge: HOME | End: 2019-06-21
Payer: COMMERCIAL

## 2019-06-21 PROCEDURE — 97140 MANUAL THERAPY 1/> REGIONS: CPT

## 2019-06-21 PROCEDURE — 97110 THERAPEUTIC EXERCISES: CPT
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
## 2019-06-24 ENCOUNTER — HOSPITAL ENCOUNTER (OUTPATIENT)
Dept: PHYSICAL THERAPY | Age: 64
Setting detail: THERAPIES SERIES
Discharge: HOME | End: 2019-06-24
Payer: COMMERCIAL

## 2019-06-24 PROCEDURE — 97110 THERAPEUTIC EXERCISES: CPT

## 2019-06-24 PROCEDURE — 97140 MANUAL THERAPY 1/> REGIONS: CPT
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
## 2019-06-26 ENCOUNTER — HOSPITAL ENCOUNTER (OUTPATIENT)
Dept: PHYSICAL THERAPY | Age: 64
Setting detail: THERAPIES SERIES
Discharge: HOME | End: 2019-06-26
Payer: COMMERCIAL

## 2019-06-26 PROCEDURE — 97110 THERAPEUTIC EXERCISES: CPT

## 2019-06-26 PROCEDURE — 97140 MANUAL THERAPY 1/> REGIONS: CPT
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
## 2019-06-28 ENCOUNTER — HOSPITAL ENCOUNTER (OUTPATIENT)
Dept: PHYSICAL THERAPY | Age: 64
Setting detail: THERAPIES SERIES
Discharge: HOME | End: 2019-06-28
Payer: COMMERCIAL

## 2019-06-28 PROCEDURE — 97140 MANUAL THERAPY 1/> REGIONS: CPT

## 2019-06-28 PROCEDURE — 97110 THERAPEUTIC EXERCISES: CPT
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
## 2019-07-03 ENCOUNTER — HOSPITAL ENCOUNTER (OUTPATIENT)
Dept: PHYSICAL THERAPY | Age: 64
Setting detail: THERAPIES SERIES
Discharge: HOME | End: 2019-07-03
Payer: COMMERCIAL

## 2019-07-03 PROCEDURE — 97110 THERAPEUTIC EXERCISES: CPT

## 2019-07-03 PROCEDURE — 97140 MANUAL THERAPY 1/> REGIONS: CPT
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
## 2019-07-08 ENCOUNTER — HOSPITAL ENCOUNTER (OUTPATIENT)
Dept: PHYSICAL THERAPY | Age: 64
Setting detail: THERAPIES SERIES
Discharge: HOME | End: 2019-07-08
Payer: COMMERCIAL

## 2019-07-08 DIAGNOSIS — R00.0 TACHYCARDIA: ICD-10-CM

## 2019-07-08 PROCEDURE — 97110 THERAPEUTIC EXERCISES: CPT

## 2019-07-08 PROCEDURE — 97140 MANUAL THERAPY 1/> REGIONS: CPT
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
## 2019-07-10 ENCOUNTER — HOSPITAL ENCOUNTER (OUTPATIENT)
Dept: PHYSICAL THERAPY | Age: 64
Setting detail: THERAPIES SERIES
Discharge: HOME | End: 2019-07-10
Payer: COMMERCIAL

## 2019-07-10 PROCEDURE — 97110 THERAPEUTIC EXERCISES: CPT

## 2019-07-10 PROCEDURE — 97140 MANUAL THERAPY 1/> REGIONS: CPT
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
## 2019-07-15 ENCOUNTER — APPOINTMENT (OUTPATIENT)
Dept: PHYSICAL THERAPY | Age: 64
End: 2019-07-15
Payer: COMMERCIAL

## 2019-07-17 ENCOUNTER — HOSPITAL ENCOUNTER (OUTPATIENT)
Dept: PHYSICAL THERAPY | Age: 64
Setting detail: THERAPIES SERIES
Discharge: HOME | End: 2019-07-17
Payer: COMMERCIAL

## 2019-07-17 PROCEDURE — 97140 MANUAL THERAPY 1/> REGIONS: CPT

## 2019-07-19 ENCOUNTER — HOSPITAL ENCOUNTER (OUTPATIENT)
Dept: PHYSICAL THERAPY | Age: 64
Setting detail: THERAPIES SERIES
Discharge: HOME | End: 2019-07-19
Payer: COMMERCIAL

## 2019-07-19 PROCEDURE — 97140 MANUAL THERAPY 1/> REGIONS: CPT
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
## 2019-07-22 ENCOUNTER — HOSPITAL ENCOUNTER (OUTPATIENT)
Dept: PHYSICAL THERAPY | Age: 64
Setting detail: THERAPIES SERIES
Discharge: HOME | End: 2019-07-22
Payer: COMMERCIAL

## 2019-07-22 PROCEDURE — 97140 MANUAL THERAPY 1/> REGIONS: CPT

## 2019-07-22 NOTE — FLOWSHEET NOTE
collar is helping more. Air conditioner blowing on him increases pain    6/14/19  Made his collar smaller with ace wrap so it is more comfortable. He will use it inermit . Discussed cervical status for PT. Will scheduale 60 min appt soon for cervical.  6/12/19 reports he went without collar yesterday and was out in the yard with wife, had bad night and is more sore today  6/5/19 reports pain is bothered by the rain today  6/3/19 reports his ant ribs have been hurting since he tried to lie on his left side. Only gets neck relief with soft collar and lying down. Pain over ant arn/shoulder. See prog note  5/20/19  Reports he got a cortisone shot in shoulder and the back of it feels pretty good but the front is still painful. Reports he can be out of neck brace and just has to wear it in the car. 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 4/22/19 having left clavicular pain, pain in L ribs, pain at inf tip of scapula  4/17/19 reports collar bone is not hurting as bad   4/15/19  Reports he continues to have popping, cracking. Reports neck MD (Dr. Skyla Green) wants him to wear neck brace for another month and will want him to follow up with vascular MD before d/cing neck brace (there is a flap on the artery)  4/5/19  Reports the shoulder doctor instructed pt to tell us to use heat on him prior to treatment to see if it helps with the amount of pain he has. Reports MD thinks he is behind about a month and it is not healing. Reports his doctor does not want to plate the fx. Reports he sees neck doctor on 4/11/19. Will be having a CT scan for abdomin and for the lump on his back  3/29/19 reports he feels his rom for flexion is getting worse. tissue abnormality. IMPRESSION:    Again demonstrated are acute traumatic fractures of the right lateral mass and anterior arch of C1 extending to the right articular facet as well as a traumatic comminuted fracture of the right transverse process and lateral mass of C2 involving the right vertebral foramen. Unchanged appearance of traumatic fracture of the right occipital condyle. No change in alignment on flexion or extension views. No evidence of atlantoaxial instability. Moderate C5-6 and C6-7 disc degenerative changes.       Pain    Patient describes pain to be constnat left shoulder pain uip into his upper trap and neck. Has pain into his upper chest, then slants down across his fx ribs both ant and post.  Patient reports  7/10 pain at rest,  10/10 pain with movement. Worsened by  -  Getting dressed/undressed, walking, lying still for no reason. Improved by - pain  Current Functional Limitations:  Not doing much  PLOF:  Fully indep  Pt. unable to tolerate laying on side of pain, fixing hair, reaching overhead, washing opposite shoulder, nor tucking in shirt due to pain.     Patient goal for therapy: move his arm a little and pain relief.       subjective:  5/19/19 pain scale for his neck is 4/10 at best the first half of the day. Then increased to 6/10 by 6 pm and 8/10 at 10 pm.  Pain always increases with going to bed, or just later in the day. Objective:  AROM: decr 95% with rot bilat and flx. Severe increase in pain with movement. Assessment: pt is not ready for cervical treatment, will wait until he has recent imaging. Plan: he will cont to use his soft collar during  the day for pain relief and rest in supine for 30-45 min every afternoon to try to decr his evening pain. Exercises:   Exercise/Equipment Resistance/Repetitions Other comments          PROM L shoulders  23'     table slides flex   1-3 x 10     pendulum hang if tolerated.    2-3x10-30\" as tolerated Will defer due to Luz Marina Guaman, [address] [place] [address] Road

## 2019-07-22 NOTE — PROGRESS NOTES
Physical Therapy    Received additional days from [employer] for [address] 86 :  End Date is 8/30/19    1

## 2019-07-24 ENCOUNTER — HOSPITAL ENCOUNTER (OUTPATIENT)
Dept: PHYSICAL THERAPY | Age: 64
Setting detail: THERAPIES SERIES
Discharge: HOME | End: 2019-07-24
Payer: COMMERCIAL

## 2019-07-24 PROCEDURE — 97140 MANUAL THERAPY 1/> REGIONS: CPT

## 2019-07-24 PROCEDURE — 97110 THERAPEUTIC EXERCISES: CPT
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
## 2019-07-29 ENCOUNTER — HOSPITAL ENCOUNTER (OUTPATIENT)
Dept: PHYSICAL THERAPY | Age: 64
Setting detail: THERAPIES SERIES
Discharge: HOME | End: 2019-07-29
Payer: COMMERCIAL

## 2019-07-29 PROCEDURE — 97140 MANUAL THERAPY 1/> REGIONS: CPT

## 2019-07-29 PROCEDURE — 97110 THERAPEUTIC EXERCISES: CPT
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
## 2019-07-31 ENCOUNTER — HOSPITAL ENCOUNTER (OUTPATIENT)
Dept: PHYSICAL THERAPY | Age: 64
Setting detail: THERAPIES SERIES
Discharge: HOME | End: 2019-07-31
Payer: COMMERCIAL

## 2019-07-31 PROCEDURE — 97140 MANUAL THERAPY 1/> REGIONS: CPT

## 2019-07-31 PROCEDURE — 97110 THERAPEUTIC EXERCISES: CPT

## 2019-07-31 NOTE — FLOWSHEET NOTE
changes since last visit. 6/21/19 states his neck was better last evening after resting during the late afternoon for 2 hours. HP to cerv spine durign supine ex.  6/17/19  Reports modified soft collar is helping more. Air conditioner blowing on him increases pain    6/14/19  Made his collar smaller with ace wrap so it is more comfortable. He will use it inermit . Discussed cervical status for PT. Will scheduale 60 min appt soon for cervical.  6/12/19 reports he went without collar yesterday and was out in the yard with wife, had bad night and is more sore today  6/5/19 reports pain is bothered by the rain today  6/3/19 reports his ant ribs have been hurting since he tried to lie on his left side. Only gets neck relief with soft collar and lying down. Pain over ant arn/shoulder. See prog note  5/20/19  Reports he got a cortisone shot in shoulder and the back of it feels pretty good but the front is still painful. Reports he can be out of neck brace and just has to wear it in the car. 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 4/22/19 having left clavicular pain, pain in L ribs, pain at inf tip of scapula  4/17/19 reports collar bone is not hurting as bad   4/15/19  Reports he continues to have popping, cracking. Reports neck MD (Dr. Kristan Oden) wants him to wear neck brace for another month and will want him to follow up with vascular MD before d/cing neck brace (there is a flap on the artery)  4/5/19  Reports the shoulder doctor instructed pt to tell us to use heat on him prior to treatment to see if it helps with the amount of pain he has. Reports MD thinks he is behind about a month and it is not healing.   Reports his doctor does

## 2019-08-05 ENCOUNTER — HOSPITAL ENCOUNTER (OUTPATIENT)
Dept: PHYSICAL THERAPY | Age: 64
Setting detail: THERAPIES SERIES
Discharge: HOME | End: 2019-08-05
Payer: COMMERCIAL

## 2019-08-05 PROCEDURE — 97110 THERAPEUTIC EXERCISES: CPT

## 2019-08-05 PROCEDURE — 97140 MANUAL THERAPY 1/> REGIONS: CPT

## 2019-08-05 NOTE — FLOWSHEET NOTE
changes are noted with disc degenerative changes. There is mild canal narrowing at these levels. SOFT TISSUES: No evidence of significant prevertebral soft tissue swelling. Atherosclerotic calcifications noted of the carotid arteries. No evidence of focal soft tissue abnormality. IMPRESSION:    Again demonstrated are acute traumatic fractures of the right lateral mass and anterior arch of C1 extending to the right articular facet as well as a traumatic comminuted fracture of the right transverse process and lateral mass of C2 involving the right vertebral foramen. Unchanged appearance of traumatic fracture of the right occipital condyle. No change in alignment on flexion or extension views. No evidence of atlantoaxial instability. Moderate C5-6 and C6-7 disc degenerative changes.       Pain    Patient describes pain to be constnat left shoulder pain uip into his upper trap and neck. Has pain into his upper chest, then slants down across his fx ribs both ant and post.  Patient reports  7/10 pain at rest,  10/10 pain with movement. Worsened by  -  Getting dressed/undressed, walking, lying still for no reason. Improved by - pain  Current Functional Limitations:  Not doing much  PLOF:  Fully indep  Pt. unable to tolerate laying on side of pain, fixing hair, reaching overhead, washing opposite shoulder, nor tucking in shirt due to pain.     Patient goal for therapy: move his arm a little and pain relief.       subjective:  5/19/19 pain scale for his neck is 4/10 at best the first half of the day. Then increased to 6/10 by 6 pm and 8/10 at 10 pm.  Pain always increases with going to bed, or just later in the day. Objective:  AROM: decr 95% with rot bilat and flx. Severe increase in pain with movement. Assessment: pt is not ready for cervical treatment, will wait until he has recent imaging.    Plan: he will cont to use his soft collar during  the day for pain relief and rest in supine for 30-45 to 4+ to 5/5    2. AROM to 150 flx  145 abd  ER to T2    3. Improve use 75%    . .         · Progress toward previous goals: Working toward goals.      Plan:   Cont 2x wk    ·      Electronically Signed by: Santosh Mayer, [address] [place] [address]

## 2019-08-07 ENCOUNTER — HOSPITAL ENCOUNTER (OUTPATIENT)
Dept: PHYSICAL THERAPY | Age: 64
Setting detail: THERAPIES SERIES
Discharge: HOME | End: 2019-08-07
Payer: COMMERCIAL

## 2019-08-07 PROCEDURE — 97140 MANUAL THERAPY 1/> REGIONS: CPT

## 2019-08-07 PROCEDURE — 97110 THERAPEUTIC EXERCISES: CPT

## 2019-08-07 NOTE — FLOWSHEET NOTE
birthday  [birth year]19 the later in the day rest periods do help him some with evening/night pain but pain will still get to 8/10 on some days. 6/24/19 No significant changes since last visit. 6/21/19 states his neck was better last evening after resting during the late afternoon for 2 hours. HP to cerv spine durign supine ex.  6/17/19  Reports modified soft collar is helping more. Air conditioner blowing on him increases pain    6/14/19  Made his collar smaller with ace wrap so it is more comfortable. He will use it inermit . Discussed cervical status for PT. Will scheduale 60 min appt soon for cervical.  6/12/19 reports he went without collar yesterday and was out in the yard with wife, had bad night and is more sore today  6/5/19 reports pain is bothered by the rain today  6/3/19 reports his ant ribs have been hurting since he tried to lie on his left side. Only gets neck relief with soft collar and lying down. Pain over ant arn/shoulder. See prog note  5/20/19  Reports he got a cortisone shot in shoulder and the back of it feels pretty good but the front is still painful. Reports he can be out of neck brace and just has to wear it in the car. 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 4/22/19 having left clavicular pain, pain in L ribs, pain at inf tip of scapula  4/17/19 reports collar bone is not hurting as bad   4/15/19  Reports he continues to have popping, cracking.   Reports neck MD (Dr. Cirilo Barahona) wants him to wear neck brace for another month and will want him to follow up with vascular MD before d/cing neck brace (there is a flap on the artery)  4/5/19  Reports the shoulder doctor instructed pt to tell us to use heat on him limited by pain [] Patient limited by other medical complications   [] Other: No change in pain noted with treatment    Prognosis: [x] Good [x] Fair  [] Poor    Patient Requires Follow-up:  [x] Yes  [] No    Plan: [x] Continue per plan of care [] Alter current plan (see comments)   [] Plan of care initiated [] Hold pending MD visit [] Discharge    Plan for Next Session:     See Progress Note: [] Yes  [x] No       Next due:    8/2/19     Electronically signed by:  Clemencia Messer [id number]    Outpatient Physical Therapy  Progress Note        Progress Note covers period from:    3/15/19-3/19/19- 4/22/19- 6/3/19-7/ 3/19- 8/5/19         Objective:  Observation:  Test measurements:     C/o popping and grinding of ribs with some of the motions especially ER and popping with IR  AROM:   IR to T12   ,  ER   To lateral/post neck( 2 inches past his ear)     ext 50,   abd 65 , Flex  75  MMT:        4-/5                3+/5                                                                             4-/5                    3+/5                   4-/5      Functional Outcome Measure:  Measure used:  SPADI 3/15/19       4/22/109 NT                       7/3/19       Score:  119                                                                       Score: 57.   44% disability  % Disability:  90%          Assessment:  Summary: pt has been seen for    33 visits    pts' response to treatment:  Pt has not improved with his shoulder function. The clavicle pain is limiting. It makes noise and \"rubs\". .  Progress is slow due to C1 ans 2 fractures, many rib fractures and left clavicle fx. .    He has been great with his attendance and compliance with HEP     Goals:GOALS  GCode:    Short Term Goals ( 4   weeks) Long Term Goals (  8 weeks)   1). advance HEP- add more PREs    MET 1). (I) HEP for shoulder   2). AROM to  100 flx  90 abd   ER to C4 2). AROM  125 flx   115 abd     ER to C7   3). MMT to 4- to 4/5. Left shoulder 3). MMT 4 to 4+   4.

## 2019-08-12 ENCOUNTER — HOSPITAL ENCOUNTER (OUTPATIENT)
Dept: PHYSICAL THERAPY | Age: 64
Setting detail: THERAPIES SERIES
Discharge: HOME | End: 2019-08-12
Payer: COMMERCIAL

## 2019-08-12 PROCEDURE — 97140 MANUAL THERAPY 1/> REGIONS: CPT

## 2019-08-12 PROCEDURE — 97110 THERAPEUTIC EXERCISES: CPT

## 2019-08-12 NOTE — FLOWSHEET NOTE
Tolerance:    [x] Patient tolerated treatment well with no adverse reactions noted [] Patient limited by fatigue   [x] Patient limited by pain [] Patient limited by other medical complications   [] Other: No change in pain noted with treatment    Prognosis: [x] Good [x] Fair  [] Poor    Patient Requires Follow-up:  [x] Yes  [] No    Plan: [x] Continue per plan of care [] Alter current plan (see comments)   [] Plan of care initiated [] Hold pending MD visit [] Discharge    Plan for Next Session:     See Progress Note: [] Yes  [x] No       Next due:    8/2/19     Electronically signed by:  Phan Santacruz [id number]    Outpatient Physical Therapy  Progress Note        Progress Note covers period from:    3/15/19-3/19/19- 4/22/19- 6/3/19-7/ 3/19- 8/5/19         Objective:  Observation:  Test measurements:     C/o popping and grinding of ribs with some of the motions especially ER and popping with IR  AROM:   IR to T12   ,  ER   To lateral/post neck( 2 inches past his ear)     ext 50,   abd 65 , Flex  75  MMT:        4-/5                3+/5                                                                             4-/5                    3+/5                   4-/5      Functional Outcome Measure:  Measure used:  SPADI 3/15/19       4/22/109 NT                       7/3/19       Score:  119                                                                       Score: 57.   44% disability  % Disability:  90%          Assessment:  Summary: pt has been seen for    33 visits    pts' response to treatment:  Pt has not improved with his shoulder function. The clavicle pain is limiting. It makes noise and \"rubs\". .  Progress is slow due to C1 ans 2 fractures, many rib fractures and left clavicle fx. .    He has been great with his attendance and compliance with HEP     Goals:GOALS  GCode:    Short Term Goals ( 4   weeks) Long Term Goals (  8 weeks)   1). advance HEP- add more PREs    MET 1). (I) HEP for shoulder   2).   AROM to

## 2019-08-14 ENCOUNTER — HOSPITAL ENCOUNTER (OUTPATIENT)
Dept: PHYSICAL THERAPY | Age: 64
Setting detail: THERAPIES SERIES
Discharge: HOME | End: 2019-08-14
Payer: COMMERCIAL

## 2019-08-14 PROCEDURE — 97110 THERAPEUTIC EXERCISES: CPT

## 2019-08-14 PROCEDURE — 97140 MANUAL THERAPY 1/> REGIONS: CPT

## 2019-08-14 NOTE — FLOWSHEET NOTE
fractures. No evidence of increased displacement of the fracture lines on flexion or extension view. There is no evidence of anterolisthesis or retrolisthesis. No change in alignment on flexion or extension. DEGENERATIVE CHANGES: Moderate C5-6 and C6-7 disc degenerative changes are noted with disc degenerative changes. There is mild canal narrowing at these levels. SOFT TISSUES: No evidence of significant prevertebral soft tissue swelling. Atherosclerotic calcifications noted of the carotid arteries. No evidence of focal soft tissue abnormality. IMPRESSION:    Again demonstrated are acute traumatic fractures of the right lateral mass and anterior arch of C1 extending to the right articular facet as well as a traumatic comminuted fracture of the right transverse process and lateral mass of C2 involving the right vertebral foramen. Unchanged appearance of traumatic fracture of the right occipital condyle. No change in alignment on flexion or extension views. No evidence of atlantoaxial instability. Moderate C5-6 and C6-7 disc degenerative changes.       Pain    Patient describes pain to be constnat left shoulder pain uip into his upper trap and neck. Has pain into his upper chest, then slants down across his fx ribs both ant and post.  Patient reports  7/10 pain at rest,  10/10 pain with movement. Worsened by  -  Getting dressed/undressed, walking, lying still for no reason. Improved by - pain  Current Functional Limitations:  Not doing much  PLOF:  Fully indep  Pt. unable to tolerate laying on side of pain, fixing hair, reaching overhead, washing opposite shoulder, nor tucking in shirt due to pain.     Patient goal for therapy: move his arm a little and pain relief.       subjective:  5/19/19 pain scale for his neck is 4/10 at best the first half of the day.   Then increased to 6/10 by 6 pm and 8/10 at 10 pm.  Pain always increases with going to bed, or just later in the

## 2019-08-19 ENCOUNTER — APPOINTMENT (OUTPATIENT)
Dept: PHYSICAL THERAPY | Age: 64
End: 2019-08-19
Payer: COMMERCIAL

## 2019-08-21 ENCOUNTER — HOSPITAL ENCOUNTER (OUTPATIENT)
Dept: PHYSICAL THERAPY | Age: 64
Setting detail: THERAPIES SERIES
Discharge: HOME | End: 2019-08-21
Payer: COMMERCIAL

## 2019-08-21 PROCEDURE — 97140 MANUAL THERAPY 1/> REGIONS: CPT

## 2019-08-21 PROCEDURE — 97110 THERAPEUTIC EXERCISES: CPT

## 2019-08-23 ENCOUNTER — HOSPITAL ENCOUNTER (OUTPATIENT)
Dept: PHYSICAL THERAPY | Age: 64
Setting detail: THERAPIES SERIES
Discharge: HOME | End: 2019-08-23
Payer: COMMERCIAL

## 2019-08-23 NOTE — FLOWSHEET NOTE
atlantoaxial space. No evidence of change in alignment. On axial views there is no evidence of change of alignment of the right C1 and C2 fractures. No evidence of increased displacement of the fracture lines on flexion or extension view. There is no evidence of anterolisthesis or retrolisthesis. No change in alignment on flexion or extension. DEGENERATIVE CHANGES: Moderate C5-6 and C6-7 disc degenerative changes are noted with disc degenerative changes. There is mild canal narrowing at these levels. SOFT TISSUES: No evidence of significant prevertebral soft tissue swelling. Atherosclerotic calcifications noted of the carotid arteries. No evidence of focal soft tissue abnormality. IMPRESSION:    Again demonstrated are acute traumatic fractures of the right lateral mass and anterior arch of C1 extending to the right articular facet as well as a traumatic comminuted fracture of the right transverse process and lateral mass of C2 involving the right vertebral foramen. Unchanged appearance of traumatic fracture of the right occipital condyle. No change in alignment on flexion or extension views. No evidence of atlantoaxial instability. Moderate C5-6 and C6-7 disc degenerative changes.       Pain    Patient describes pain to be constnat left shoulder pain uip into his upper trap and neck. Has pain into his upper chest, then slants down across his fx ribs both ant and post.  Patient reports  7/10 pain at rest,  10/10 pain with movement. Worsened by  -  Getting dressed/undressed, walking, lying still for no reason.   Improved by - pain  Current Functional Limitations:  Not doing much  PLOF:  Fully indep  Pt. unable to tolerate laying on side of pain, fixing hair, reaching overhead, washing opposite shoulder, nor tucking in shirt due to pain.     Patient goal for therapy: move his arm a little and pain relief.       subjective:  5/19/19 pain scale for his neck is 4/10 at best the first half shoulder function. The clavicle pain is limiting. It makes noise and \"rubs\". .  Progress is slow due to C1 ans 2 fractures, many rib fractures and left clavicle fx. .    He has been great with his attendance and compliance with HEP     Goals:GOALS  GCode:    Short Term Goals ( 4   weeks) Long Term Goals (  8 weeks)   1). advance HEP- add more PREs    MET 1). (I) HEP for shoulder   2). AROM to  100 flx  90 abd   ER to C4 2). AROM  125 flx   115 abd     ER to C7   3). MMT to 4- to 4/5. Left shoulder 3). MMT 4 to 4+   4. Improve use 20%                          MET 4.improve use 50%          12 week goals    1. mmt to 4+ to 5/5    2. AROM to 150 flx  145 abd  ER to T2    3. Improve use 75%    . .         · Progress toward previous goals: Working toward goals.      Plan:   Cont 2x wk    ·      Electronically Signed by: Mari Kim, [address] [place] [address]

## 2019-08-26 ENCOUNTER — HOSPITAL ENCOUNTER (OUTPATIENT)
Dept: PHYSICAL THERAPY | Age: 64
Setting detail: THERAPIES SERIES
Discharge: HOME | End: 2019-08-26
Payer: COMMERCIAL

## 2019-08-26 PROCEDURE — 97110 THERAPEUTIC EXERCISES: CPT

## 2019-08-26 PROCEDURE — 97140 MANUAL THERAPY 1/> REGIONS: CPT

## 2019-08-26 NOTE — FLOWSHEET NOTE
subjective:  5/19/19 pain scale for his neck is 4/10 at best the first half of the day. Then increased to 6/10 by 6 pm and 8/10 at 10 pm.  Pain always increases with going to bed, or just later in the day. Objective:  AROM: decr 95% with rot bilat and flx. Severe increase in pain with movement. Assessment: pt is not ready for cervical treatment, will wait until he has recent imaging. Plan: he will cont to use his soft collar during  the day for pain relief and rest in supine for 30-45 min every afternoon to try to decr his evening pain. Exercises:   Exercise/Equipment Resistance/Repetitions Other comments          PROM L shoulders  23'     table slides flex   1-3 x 10     pendulum hang if tolerated.    2-3x10-30\" as tolerated Will defer due to c/o   TA   1x10    6/3/19 resisted scap ex onhis side   3x5      Ceiling punches 3x5    Will add circles      Isometric sh flx,ext, IR/ER In the doorway, standing    RS shoulder at 90 30\" very light    Standing hip abd w/TA    3x5    6/7/19 TA with marching    4-6 marches per exhale      Ceiling punches and circles 3#  3x5  Given HO    Will try some leaning and extension and row.s   Not today - too bring soft collar in and will do with it     6/19/19 prone ext  (leaning on counter) 3# 3x10                  Prone row \"           \"   Too painful today    6/21/19 -  8/5/19  ceiling punch   3#  3x 10                                  tricep ext 3#  3x 10    Long arm adduction in supine Blue band  2x10 HEP   short arm adduction in supine Blue band  2x10 HEP   Short arm horiz abd in supine \"              \"  2x10 HEP   Bicep curls 3# 3x10   Home with soup can    8/23/19 very poor convergence with eyes, given 2 targets to practivce alterantng focus + at 15 inches                       Therapeutic Exercise:     15 min   Scapular resisted ex.   e.      Group Therapy:      Home Exercise Program:      Therapeutic Activity:      Neuromuscular Re-education:      Gait:      Manual

## 2019-08-28 ENCOUNTER — HOSPITAL ENCOUNTER (OUTPATIENT)
Dept: PHYSICAL THERAPY | Age: 64
Setting detail: THERAPIES SERIES
Discharge: HOME | End: 2019-08-28
Payer: COMMERCIAL

## 2019-08-28 PROCEDURE — 97140 MANUAL THERAPY 1/> REGIONS: CPT

## 2019-08-28 PROCEDURE — 97110 THERAPEUTIC EXERCISES: CPT

## 2019-08-28 NOTE — PROGRESS NOTES
Outpatient Physical Therapy     [x] Daily Treatment Note   [] Progress Note  19, 19, 6/3/19 , 7/3/19, 19 , 19 [] Discharge Note        Date:  2019                                  Patient Name:  Letitia Porter        :  1955    Restrictions/Precautions:      Diagnosis:  fx clavicle    Treatment Diagnosis:  Same. also has FX ,of C1 and C2.    fx of occ condyle. fx ribs 3-10. Had a collapsed lung    Insurance/Certification information:  [facility] 2-3x/wk   5/15/19-  19-19  Referring Physician:  Elisabeth Kevin MD   ()     Plan of care signed (Y/N):      Visit# / total visits:   36   today is his last scheduled. Will hold for now-he may be having  left shoulder surgery      Pain level:    up to  5-8/10   shoulder ant and clavicle ,   back shoulder 0/10, ribs 0-2/10, neck 4-8 /10(5/10 currently)     Subjective:  19 no improvement. 19  Reports Friday afternoon and Saturday was like a constant incola horse  19 doing about the same. Discussed vision. 19 has had more pain since a week ago last thurs. Just more popping in his back. 19  reports pain was worse this morning but has calmed down a bit. Hurting in rhomboid area  19  Reports today he is having increased symptoms with pain today in clavicle and into scap  19  Reports neck is a bit better but collar bone is still popping   19 no signif progress. The anterior clavicle pain has worsened feels like it is rubbing.  19 neck a bit better, shoulder increases in the afternoon  19 reports collar bone and neck remain the most painful   19  Less falred up todday  19 doing about the same. Tried to rest his neck and shoulder more- sat was bad for no reason. 19 more clavicle pain/sh pain.   Went to MD MON> he palpated along the clavicle, not really pleased with healing, wont do surgery until neck is cleared due to C1 and C2 fx.  7/10/19 in shirt due to pain.     Patient goal for therapy: move his arm a little and pain relief.       subjective:  5/19/19 pain scale for his neck is 4/10 at best the first half of the day. Then increased to 6/10 by 6 pm and 8/10 at 10 pm.  Pain always increases with going to bed, or just later in the day. Objective:  AROM: decr 95% with rot bilat and flx. Severe increase in pain with movement. Assessment: pt is not ready for cervical treatment, will wait until he has recent imaging. Plan: he will cont to use his soft collar during  the day for pain relief and rest in supine for 30-45 min every afternoon to try to decr his evening pain. Exercises:   Exercise/Equipment Resistance/Repetitions Other comments          PROM L shoulders  23'     table slides flex   1-3 x 10     pendulum hang if tolerated.    2-3x10-30\" as tolerated Will defer due to c/o   TA   1x10    6/3/19 resisted scap ex onhis side   3x5      Ceiling punches 3x5    Will add circles      Isometric sh flx,ext, IR/ER In the doorway, standing    RS shoulder at 90 30\" very light    Standing hip abd w/TA    3x5    6/7/19 TA with marching    4-6 marches per exhale      Ceiling punches and circles 3#  3x5  Given HO    Will try some leaning and extension and row.s   Not today - too bring soft collar in and will do with it     6/19/19 prone ext  (leaning on counter) 3# 3x10                  Prone row \"           \"   Too painful today    6/21/19 -  8/5/19  ceiling punch   3#  3x 10                                  tricep ext 3#  3x 10    Long arm adduction in supine Blue band  2x10 HEP   short arm adduction in supine Blue band  2x10 HEP   Short arm horiz abd in supine \"              \"  2x10 HEP   Bicep curls 3# 3x10   Home with soup can    8/23/19 very poor convergence with eyes, given 2 targets to practivce alterantng focus + at 15 inches                       Therapeutic Exercise:     15 min   Scapular resisted ex.   e.      Group Therapy:      Home

## 2019-10-01 ENCOUNTER — HOSPITAL ENCOUNTER (OUTPATIENT)
Dept: PHYSICAL THERAPY | Age: 64
Setting detail: THERAPIES SERIES
Discharge: HOME | End: 2019-10-01
Payer: COMMERCIAL

## 2019-10-01 PROCEDURE — 97110 THERAPEUTIC EXERCISES: CPT

## 2019-10-01 PROCEDURE — 97140 MANUAL THERAPY 1/> REGIONS: CPT

## 2019-10-04 ENCOUNTER — HOSPITAL ENCOUNTER (OUTPATIENT)
Dept: PHYSICAL THERAPY | Age: 64
Setting detail: THERAPIES SERIES
Discharge: HOME | End: 2019-10-04
Payer: COMMERCIAL

## 2019-10-04 PROCEDURE — 97140 MANUAL THERAPY 1/> REGIONS: CPT

## 2019-10-04 PROCEDURE — 97110 THERAPEUTIC EXERCISES: CPT

## 2019-10-08 ENCOUNTER — HOSPITAL ENCOUNTER (OUTPATIENT)
Dept: PHYSICAL THERAPY | Age: 64
Setting detail: THERAPIES SERIES
Discharge: HOME | End: 2019-10-08
Payer: COMMERCIAL

## 2019-10-08 PROCEDURE — 97150 GROUP THERAPEUTIC PROCEDURES: CPT

## 2019-10-08 PROCEDURE — 97140 MANUAL THERAPY 1/> REGIONS: CPT

## 2019-10-11 ENCOUNTER — HOSPITAL ENCOUNTER (OUTPATIENT)
Dept: PHYSICAL THERAPY | Age: 64
Setting detail: THERAPIES SERIES
Discharge: HOME | End: 2019-10-11
Payer: COMMERCIAL

## 2019-10-11 PROCEDURE — 97110 THERAPEUTIC EXERCISES: CPT

## 2019-10-11 PROCEDURE — 97140 MANUAL THERAPY 1/> REGIONS: CPT

## 2019-10-15 ENCOUNTER — HOSPITAL ENCOUNTER (OUTPATIENT)
Dept: PHYSICAL THERAPY | Age: 64
Setting detail: THERAPIES SERIES
Discharge: HOME | End: 2019-10-15
Payer: COMMERCIAL

## 2019-10-15 PROCEDURE — 97110 THERAPEUTIC EXERCISES: CPT

## 2019-10-15 PROCEDURE — 97140 MANUAL THERAPY 1/> REGIONS: CPT

## 2019-10-18 ENCOUNTER — HOSPITAL ENCOUNTER (OUTPATIENT)
Dept: PHYSICAL THERAPY | Age: 64
Setting detail: THERAPIES SERIES
Discharge: HOME | End: 2019-10-18
Payer: COMMERCIAL

## 2019-10-18 PROCEDURE — 97140 MANUAL THERAPY 1/> REGIONS: CPT

## 2019-10-18 PROCEDURE — 97110 THERAPEUTIC EXERCISES: CPT

## 2019-10-22 ENCOUNTER — HOSPITAL ENCOUNTER (OUTPATIENT)
Dept: PHYSICAL THERAPY | Age: 64
Setting detail: THERAPIES SERIES
Discharge: HOME | End: 2019-10-22
Payer: COMMERCIAL

## 2019-10-22 PROCEDURE — 97140 MANUAL THERAPY 1/> REGIONS: CPT

## 2019-10-22 PROCEDURE — 97110 THERAPEUTIC EXERCISES: CPT

## 2019-10-29 ENCOUNTER — APPOINTMENT (OUTPATIENT)
Dept: PHYSICAL THERAPY | Age: 64
End: 2019-10-29
Payer: COMMERCIAL

## 2019-11-01 ENCOUNTER — APPOINTMENT (OUTPATIENT)
Dept: PHYSICAL THERAPY | Age: 64
End: 2019-11-01
Payer: COMMERCIAL

## 2019-11-04 LAB
GLUCOSE BLD-MCNC: 217 MG/DL (ref 70–100)
GLUCOSE BLD-MCNC: 303 MG/DL (ref 70–100)

## 2019-11-05 ENCOUNTER — APPOINTMENT (OUTPATIENT)
Dept: PHYSICAL THERAPY | Age: 64
End: 2019-11-05
Payer: COMMERCIAL

## 2019-11-08 ENCOUNTER — APPOINTMENT (OUTPATIENT)
Dept: PHYSICAL THERAPY | Age: 64
End: 2019-11-08
Payer: COMMERCIAL

## 2019-11-12 ENCOUNTER — APPOINTMENT (OUTPATIENT)
Dept: PHYSICAL THERAPY | Age: 64
End: 2019-11-12
Payer: COMMERCIAL

## 2019-11-27 ENCOUNTER — HOSPITAL ENCOUNTER (OUTPATIENT)
Dept: PHYSICAL THERAPY | Age: 64
Setting detail: THERAPIES SERIES
Discharge: HOME | End: 2019-11-27
Payer: COMMERCIAL

## 2019-11-27 PROCEDURE — 97140 MANUAL THERAPY 1/> REGIONS: CPT

## 2019-11-27 PROCEDURE — 97161 PT EVAL LOW COMPLEX 20 MIN: CPT

## 2019-11-27 PROCEDURE — 97110 THERAPEUTIC EXERCISES: CPT

## 2019-12-02 ENCOUNTER — HOSPITAL ENCOUNTER (OUTPATIENT)
Dept: PHYSICAL THERAPY | Age: 64
Setting detail: THERAPIES SERIES
Discharge: HOME | End: 2019-12-02
Payer: COMMERCIAL

## 2019-12-02 PROCEDURE — 97110 THERAPEUTIC EXERCISES: CPT

## 2019-12-02 PROCEDURE — 97140 MANUAL THERAPY 1/> REGIONS: CPT

## 2019-12-04 ENCOUNTER — HOSPITAL ENCOUNTER (OUTPATIENT)
Dept: PHYSICAL THERAPY | Age: 64
Setting detail: THERAPIES SERIES
Discharge: HOME | End: 2019-12-04
Payer: COMMERCIAL

## 2019-12-04 PROCEDURE — 97140 MANUAL THERAPY 1/> REGIONS: CPT

## 2019-12-09 ENCOUNTER — HOSPITAL ENCOUNTER (OUTPATIENT)
Dept: PHYSICAL THERAPY | Age: 64
Setting detail: THERAPIES SERIES
Discharge: HOME | End: 2019-12-09
Payer: COMMERCIAL

## 2019-12-09 PROCEDURE — 97140 MANUAL THERAPY 1/> REGIONS: CPT

## 2019-12-09 PROCEDURE — 97110 THERAPEUTIC EXERCISES: CPT

## 2019-12-11 ENCOUNTER — HOSPITAL ENCOUNTER (OUTPATIENT)
Dept: PHYSICAL THERAPY | Age: 64
Setting detail: THERAPIES SERIES
Discharge: HOME | End: 2019-12-11
Payer: COMMERCIAL

## 2019-12-11 PROCEDURE — 97110 THERAPEUTIC EXERCISES: CPT

## 2019-12-11 PROCEDURE — 97140 MANUAL THERAPY 1/> REGIONS: CPT

## 2019-12-16 ENCOUNTER — HOSPITAL ENCOUNTER (OUTPATIENT)
Dept: PHYSICAL THERAPY | Age: 64
Setting detail: THERAPIES SERIES
Discharge: HOME | End: 2019-12-16
Payer: COMMERCIAL

## 2019-12-16 PROCEDURE — 97140 MANUAL THERAPY 1/> REGIONS: CPT

## 2019-12-18 ENCOUNTER — HOSPITAL ENCOUNTER (OUTPATIENT)
Dept: PHYSICAL THERAPY | Age: 64
Setting detail: THERAPIES SERIES
Discharge: HOME | End: 2019-12-18
Payer: COMMERCIAL

## 2019-12-18 PROCEDURE — 97140 MANUAL THERAPY 1/> REGIONS: CPT

## 2019-12-23 ENCOUNTER — HOSPITAL ENCOUNTER (OUTPATIENT)
Dept: PHYSICAL THERAPY | Age: 64
Setting detail: THERAPIES SERIES
Discharge: HOME | End: 2019-12-23
Payer: COMMERCIAL

## 2019-12-23 PROCEDURE — 97140 MANUAL THERAPY 1/> REGIONS: CPT

## 2019-12-26 ENCOUNTER — HOSPITAL ENCOUNTER (OUTPATIENT)
Dept: PHYSICAL THERAPY | Age: 64
Setting detail: THERAPIES SERIES
Discharge: HOME | End: 2019-12-26
Payer: COMMERCIAL

## 2019-12-26 PROCEDURE — 97140 MANUAL THERAPY 1/> REGIONS: CPT

## 2019-12-30 ENCOUNTER — HOSPITAL ENCOUNTER (OUTPATIENT)
Dept: PHYSICAL THERAPY | Age: 64
Setting detail: THERAPIES SERIES
Discharge: HOME | End: 2019-12-30
Payer: COMMERCIAL

## 2019-12-30 PROCEDURE — 97140 MANUAL THERAPY 1/> REGIONS: CPT

## 2019-12-30 PROCEDURE — 97110 THERAPEUTIC EXERCISES: CPT

## 2020-01-08 ENCOUNTER — HOSPITAL ENCOUNTER (OUTPATIENT)
Dept: PHYSICAL THERAPY | Age: 65
Setting detail: THERAPIES SERIES
Discharge: HOME | End: 2020-01-08
Payer: COMMERCIAL

## 2020-01-08 PROCEDURE — 97163 PT EVAL HIGH COMPLEX 45 MIN: CPT

## 2020-01-08 PROCEDURE — 97112 NEUROMUSCULAR REEDUCATION: CPT
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
## 2020-01-09 NOTE — PRE-CERTIFICATION NOTE
[place] voicemail with Katerina Black [place] [facility]  [place] clarify [place]10 codes & treatment approved

## 2020-01-13 ENCOUNTER — HOSPITAL ENCOUNTER (OUTPATIENT)
Dept: PHYSICAL THERAPY | Age: 65
Setting detail: THERAPIES SERIES
Discharge: HOME | End: 2020-01-13
Payer: COMMERCIAL

## 2020-01-13 PROCEDURE — 97116 GAIT TRAINING THERAPY: CPT

## 2020-01-13 PROCEDURE — 97112 NEUROMUSCULAR REEDUCATION: CPT

## 2020-01-13 NOTE — FLOWSHEET NOTE
needed  Vestibular:  Have pt fill out [id number] form. Once receive clearance either by MD or new CT scan, will test THE [facility] B, initiate balance activities, eye exs as needed, gait, LE ex as needed. Subjective:   Pt states he tried wearing his prism glasses for distance but states it almost looks like it is in 3D. States he \"sees too much\" with them on. Pt sees Dr. Varsha Edwards tomorrow (according to Lucas Bradshaw @ [facility])  Pt brought his list with him of things that make him dizzy:  Standing up too fast, turning too fast, getting up out of bed, riding in car (things moving while riding, saida in peripheral vision), busy rugs and patterns on carpet, movement of head     Pain level: Currently at rest 4/10 neck , 0/10 L shouder  AT EVAL:   Patient describes pain to be constant, Sharp, dull, aching, squeezing, throbbing and pressure  Pain location:  Neck and HA, L clavicle and shoulder  Patient reports pain is:  6/10 pain at present      8/10 pain at its worst.    Objective:     PT left message for Dr. Aida Miller office (spoke with Bruno Partida who states a nurse will call back)  1)  to request CT scan for cervical spine and to ask them to put in C9 for CT scan, 2) any precautions for doing PT on neck  3) is it ok to test THE [facility] (45 deg rotation and 20 deg extension of neck)  PT also spoke to Lucas Bradshaw @ [facility] who agrees pt needs C9 for CT scan and doesn't care if it comes from Dr. Aida Miller office or she may ask Dr. Varsha Edwards (eye MD) for it. She will also get diagnosis code for the vestibular therapy and will also look into getting C9 to treat cervical spine . BP prior: 123/79     Exercises:    Exercises in bold performed in department today. Items not bolded are carried forward from prior visits for continuity of the record.   Exercise/Equipment Resistance/Repetitions HEP Other comments       []        []        []        []        []        []        []        []        []          []         []        [] []        []        []        []        []        []      Therapeutic Exercise/Home Exercise Program:   0 minutes    Group Therapy:    0 minutes    Therapeutic Activity:  0 minutes     Gait: 10 minutes  Performed Tinetti gait: see score below under balance  Gait training in dept with straight cane and without AD. Pt advised to cont use of straight cane with gait to prevent falls. Neuromuscular Re-Education:  46 minutes  Pt re-educated on why PT is testing for BPPV. Performed modified David Magyar in [place] to the L and to the R, no nystagmus noted, pt reports some dizziness and lightheadedness but mostly with return to sit. PT not sure if test position was adequate to provoke a response. Performed roll test (rolled entire body, due to limited cervical rotation B): test negative for nystagmus and vertigo. Oculomotor Examination:    Spontaneous Nystagmus:  []? Yes   []? No       Gaze Holding Nystagmus:  []? Yes   [x]? No     Eye Movement Range:  [x]? Conjugate     []? Disconjugate      Vergence:                   [x]?WNL     []? Abnormal   dysconjugate on 1st attempt and then improved to conjugate by 3rd attempt  Smooth Pursuits:      [x]? WNL    []? Abnormal/saccadic intrusions noted                         Saccades:                  []?WNL (2 or less)     [x]? Slow velocity     [x]? Impaired accuracy undershooting)      Remaining tests not tested due to limited cervical mobility and not sure if fractures completely healed yet:  VOR (Cancellation):    VOR (Slow):                Head Impulse Test/ Head Thrust Test:     DVA:                                                            Performed Tinetti:   Balance Score: 7  Gait Score: 8  Tinetti Total Score: 15     Pt inst to try wearing prism glasses (distance) while seated and watching TV and see if he can increase wearing time. PT mentioned concerns with prisms to Johnny Steel who will be going to pt's eye appt tomorrow with pt.        Canalith Repositioning Procedure:  0 minutes    Manual Therapy:  0 minutes    Modalities: 0 minutes    Functional Outcome Measure:   [x]NA  Measure Used:   Date Assessed:  Score:     Assessment/Treatment/Activity Tolerance:    Patients response to treatment:   [x]Patient tolerated treatment well []Patient limited by fatigue   []Patient limited by pain  []Patient limited by other medical complications   []Other:     Goals:   Progress towards goals:  Goals established on 1/8/20     GOALS      Short Term Goals:   3  weeks Long Term Goals:  6  weeks   1). Establish HEP 1). Pt indep with HEP   2). Assess DHI and make LTG 2). Decrease subjective complaints of dizziness and vertigo by 75% or better    3). Assess balance with DGI or Tinetti and make LTG MET 3). improve Tinetti score to 24/28 or better    4). Test for BPPV and treat if needed  4). DHI score TBD   5). 5). Gait without AD for most distances with no significant deviations.         Prognosis: [x]Good   []Fair   []Poor    Patient Requires Follow-up:  [x]Yes  []No    Plan: []Plan of care initiated     [x]Continue per plan of care for vestibular diagnosis and for L shoulder/clavicle     [] Alter current plan (see comments)    []Hold pending MD visit []Discharge    Timed Code Treatment Minutes:  56    Total Treatment Minutes:  56    Medicare Cap total YTD:   [x]N/A    Workers Comp Time Stamp    Time In:  1104   Time Out: 1200    Electronically signed by:  Cathy Lang, OMT-C, [id number]

## 2020-01-16 ENCOUNTER — HOSPITAL ENCOUNTER (OUTPATIENT)
Dept: PHYSICAL THERAPY | Age: 65
Setting detail: THERAPIES SERIES
Discharge: HOME | End: 2020-01-16
Payer: COMMERCIAL

## 2020-01-16 PROCEDURE — 97110 THERAPEUTIC EXERCISES: CPT

## 2020-01-16 PROCEDURE — 97140 MANUAL THERAPY 1/> REGIONS: CPT

## 2020-01-16 NOTE — FLOWSHEET NOTE
Outpatient Physical Therapy     [x] Daily Treatment Note   [] Progress Note   [] Discharge Note    Date:  1/16/2020    Patient Name:  Haley Pedersen  \"Fili\"  Wife \"Tarah\"        YOB: 1955    Medical Diagnosis:  Vestibular therapy per C9 (no diagnosis, dept checking with [facility] for diagosis and code) /L clavicle ORIF with non-union                                                     ICD 10:  vestibular Not on C9  / S42.022K     Treatment Diagnosis:  Dizziness/vertigo, imbalance, abnormality of gait, L shoulder/arm pain, weakness, decreased ROM     Onset Date:    2/14/19                 Referral Date:  12/6/19 (date of C9 for vestibular)   / 12/16/19  (date for L clavicle)       Referring Physician:  Dr. Sabrina Galvan (neurosurgeon- [phone])  / Dr. Qiana Stephens ( ortho [phone])                                                    Visits Allowed/Insurance/Certification Information:  [facility],  approved for treatment as needed for vestibular and L clavicle from 12/16/19 to 1/24/20, 2-3x/week x 4-6 weeks     Restrictions/Precautions:  also had FX of C1 and C2 and  fx of occ condyle (last CT in August 2019 still healing)      fx ribs 3-10.     Had a collapsed lung and concussion. Tachycardia per wife since accident, on meds. HTN, DM, ORIF L clavicle 11/4/19, Initial Restrictions: no flx above 90, no lifting above 5#.   Per phone conversation with Dr. Lovett Corporal office on 1/13/20, activity as tolerated per MD note on 12/16/19     Plan of care sent to provider:   [x]Faxed to Dr. Coleman Tam  []Co-signature    (attempts: 1[x] 2 []3[])         Plan of care signed:      [x]Yes date: 1/14/20           []No      Progress Note covers period from (if applicable):    [x]NA    []From          To           Next Progress Note due:   1/29/20    Visit# / total visits:  3/12-18 from 12/16/19 to 1/24/20    Plan for Next Session:    L clavicle: Shoulder ROM and strength, will review exs given and revise as needed  Vestibular: Once receive clearance either by MD or new CT scan, will test THE [facility] B. Initiate balance activities, eye exs as needed, gait, LE ex as needed. Subjective:   Pt saw Dr. Jamey Edwards Tuesday, eyes are about the same, MD states he is where he wants him to be. Pt is to ease into wearing prism glasses for distance, pt is to wear for all distances except for reading. Eye MD states he should get used to them gradually over time. Pt returns to Dr. Maverick Pacheco 1/27/20  Pt states his clavicle doesn't really cause pain, states it is numb. Pt states he has been doing some of the theraband exs  Wife states she will need to return to work soon and that she will need to arrange for transportation. Wife states Dr. Jamey Edwards agreed to order CT scan for cervical spine       Pain level: Currently at rest 4-5/10 neck , 0/10 L shouder. Neck pain as high as 6/10, L clavicle and shoulder 0/10 since last visit. AT EVAL:   Patient describes pain to be constant, Sharp, dull, aching, squeezing, throbbing and pressure  Pain location:  Neck and HA, L clavicle and shoulder  Patient reports pain is:  6/10 pain at present      8/10 pain at its worst.    Objective:   1/13/20:   PT left message for Dr. Andrew Hamilton office (spoke with Erin Hernandez who states a nurse will call back)  1)  to request CT scan for cervical spine and to ask them to put in C9 for CT scan, 2) any precautions for doing PT on neck  3) is it ok to test THE [facility] (45 deg rotation and 20 deg extension of neck)  PT also spoke to Jorgito's @ [facility] who agrees pt needs C9 for CT scan and doesn't care if it comes from Dr. Andrew Hamilton office or she may ask Dr. Jamey Edwards (eye MD) for it. She will also get diagnosis code for the vestibular therapy and will also look into getting C9 to treat cervical spine . Exercises:    Exercises in bold performed in department today.   Items not bolded are carried forward from prior visits for continuity of the record. Exercise/Equipment Resistance/Repetitions HEP Other comments     Pt has numerous exercises from previous PT, PT will revise/re-add as needed:   []      pendulums 20x CW and CCW [x]      scap retraction standing with arms at sides Hold 10 sec, 2-3x10 [x]      Supine cane press up and then shoulder flexion Hold 10-20 sec, 10 reps [x]      Supine ER with cane with arm at 45 deg abd  Hold 10-20 sec, 10 reps [x]      tband exs  To review next visit  []        []        []        []          []         []        []        []        []        []        []        []        []      Therapeutic Exercise/Home Exercise Program:   59 minutes  HEP established, See above, pt given handout(s)   Reassessed shoulder:    UE Range of Motion/Strength Testing       Range Tested AROM PROM Resisted Comments   *denotes pain Left Right Left Right Left Right    Shoulder Flexion 70 150 84  160 3- 5    Shoulder Extension 40 65   4- 5    Shoulder Abduction 70 pain 158 58 107 3- 5    Shoulder Adduction      4- 5    Shoulder IR L3 L3 30 @ 50 deg abd 35 @ 90 deg abd 4- 5    Shoulder ER To ear/UPPER TRAP T4 33 @ 50 deg  85 @ 90 deg abd  3+ 5    Elbow Flexion     4 5    Elbow Extension      4 5      PROM to L shoulder to pt tolerance    Group Therapy:    0 minutes    Therapeutic Activity:  0 minutes     Gait: 0 minutes    Neuromuscular Re-Education:  10 minutes  Discussed Dr. Darylene Bison visit  Reviewed inst for wearing glasses      Canalith Repositioning Procedure:  0 minutes    Manual Therapy:   10 minutes   Gr I-III GH dist, inf and post glides to pt tolerance    Modalities: 0 minutes    Functional Outcome Measure:   []NA   Measure Used:  [facility]  Date Assessed: 1/16/20  Score: 50    Assessment/Treatment/Activity Tolerance:  Pt has made some progress with L shoulder since surgery, see goals below. However, pt still with significant guarding, GH hypomobility, ROM and strength deficits.      Patients response to treatment:   [x]Patient tolerated treatment well []Patient limited by fatigue   []Patient limited by pain  []Patient limited by other medical complications   []Other:     Goals:   Progress towards goals:  NA    GOALS   vestibular    Short Term Goals:   3  weeks Long Term Goals:  6  weeks   1). Establish HEP 1). Pt indep with HEP   2). Assess DHI and make LTG  MET 2). Decrease subjective complaints of dizziness and vertigo by 75% or better    3). Assess balance with DGI or Tinetti and make LTG MET 3). improve Tinetti score to 24/28 or better    4). Test for BPPV and treat if needed  4). DHI score improved to 15 or less   5). 5). Gait without AD for most distances with no significant deviations. Goals: L clavicle     Short Term Goals ( 3-4   weeks) Long Term Goals (  8 weeks)   1).    Establish HEP MET 1). (I) HEP for shoulder   2). AROM to   80 flx NOT MET   80 abd NOT MET  ER to upper trap MET 2). AROM  120 flx   110 abd     ER to C5   3). MMT to  3+ to 4- MET EXCEPT FOR ABDUCTION AND FLEXION 3). MMT 4 to 4+   4. Improve use 20%                            4.improve use 50%             12 week goals     1. mmt to 4+ to 5/5     2. AROM to 140 flx  130 abd  ER to T2     3. Improve use 75%    . Emma Erwin        Prognosis: [x]Good   []Fair   []Poor    Patient Requires Follow-up:  [x]Yes  []No    Plan: []Plan of care initiated     [x]Continue per plan of care for vestibular diagnosis and for L shoulder/clavicle     [] Alter current plan (see comments)    []Hold pending MD visit []Discharge    Timed Code Treatment Minutes:  79    Total Treatment Minutes:  79    Medicare Cap total YTD:   [x]N/A    Workers Comp Time Stamp    Time In:  1101   Time Out: [place]    Electronically signed by:  Cathy Peters, OMT-C, [id number]

## 2020-01-20 ENCOUNTER — HOSPITAL ENCOUNTER (OUTPATIENT)
Dept: PHYSICAL THERAPY | Age: 65
Setting detail: THERAPIES SERIES
Discharge: HOME | End: 2020-01-20
Payer: COMMERCIAL

## 2020-01-20 PROCEDURE — 97110 THERAPEUTIC EXERCISES: CPT

## 2020-01-20 PROCEDURE — 97112 NEUROMUSCULAR REEDUCATION: CPT

## 2020-01-20 PROCEDURE — 97140 MANUAL THERAPY 1/> REGIONS: CPT

## 2020-01-20 NOTE — FLOWSHEET NOTE
Outpatient Physical Therapy     [x] Daily Treatment Note   [] Progress Note   [] Discharge Note    Date:  1/20/2020    Patient Name:  Tyree Joshi  \"Fili\"  Wife \"Tarah\"        YOB: 1955    Medical Diagnosis:  Vestibular therapy per C9 (no diagnosis, dept checking with [facility] for diagosis and code) /L clavicle ORIF with non-union                                                     ICD 10:  vestibular Not on C9  / S42.022K     Treatment Diagnosis:  Dizziness/vertigo, imbalance, abnormality of gait, L shoulder/arm pain, weakness, decreased ROM     Onset Date:    2/14/19                 Referral Date:  12/6/19 (date of C9 for vestibular)   / 12/16/19  (date for L clavicle)       Referring Physician:  Dr. Yogi Jackson (neurosurgeon- [phone])  / Dr. Ketan Levin ( ortho [phone])                                                    Visits Allowed/Insurance/Certification Information:  [facility],  approved for treatment as needed for vestibular and L clavicle from 12/16/19 to 1/24/20, 2-3x/week x 4-6 weeks     Restrictions/Precautions:  also had FX of C1 and C2 and  fx of occ condyle (last CT in August 2019 still healing)      fx ribs 3-10.     Had a collapsed lung and concussion. Tachycardia per wife since accident, on meds. HTN, DM, ORIF L clavicle 11/4/19, Initial Restrictions: no flx above 90, no lifting above 5#.   Per phone conversation with Dr. Rosebud Fleischer office on 1/13/20, activity as tolerated per MD note on 12/16/19     Plan of care sent to provider:   [x]Faxed to Dr. Raymon Justice  []Co-signature    (attempts: 1[x] 2 []3[])         Plan of care signed:      [x]Yes date: 1/14/20           []No      Progress Note covers period from (if applicable):    [x]NA    []From          To           Next Progress Note due:   1/29/20    Visit# / total visits:  4/12-18 from 12/16/19 to 1/24/20    Plan for Next Session:    L clavicle: Shoulder ROM and strength, will review exs given and revise as needed  Vestibular: Once receive clearance either by MD or new CT scan, will test THE [facility] B. Initiate balance activities, eye exs as needed, gait, LE ex as needed. Subjective: Wife states cervical MRI was approved, needs to get scheduled. Pt returns to Dr. Jennifer Carvajal 1/27/20  Pt states weather can make pain increased both in neck and clavicle. Pain level: Currently at rest 6-7/10 neck , 3/10 L shouder. Neck pain as high as 7/10, L clavicle and shoulder 3/10 since last visit. AT EVAL:   Patient describes pain to be constant, Sharp, dull, aching, squeezing, throbbing and pressure  Pain location:  Neck and HA, L clavicle and shoulder  Patient reports pain is:  6/10 pain at present      8/10 pain at its worst.    Objective:   1/13/20:   PT left message for Dr. Zachery Hall office (spoke with Ita Chirinos who states a nurse will call back)  1)  to request CT scan for cervical spine and to ask them to put in C9 for CT scan, 2) any precautions for doing PT on neck  3) is it ok to test THE [facility] (45 deg rotation and 20 deg extension of neck)  PT also spoke to Jorgito's @ [facility] who agrees pt needs C9 for CT scan and doesn't care if it comes from Dr. Zachery Hall office or she may ask Dr. Anne Magallanes (eye MD) for it. She will also get diagnosis code for the vestibular therapy and will also look into getting C9 to treat cervical spine . PT still has not heard back from Dr. Carito Christian office      Exercises:    Exercises in bold performed in department today. Items not bolded are carried forward from prior visits for continuity of the record.   Exercise/Equipment Resistance/Repetitions HEP Other comments     nustep  Level 4, 10 minutes [] Seat 11, arms 12, subjective x 5 min during     Shoulder exs:         pendulums 20x CW and CCW [x]      scap retraction standing with arms at sides Hold 10 sec, 2-3x10 [x]      Supine cane press up and then shoulder flexion Hold 10-20 sec, 10 reps [x]      Supine ER with cane with arm at 45 deg abd  Hold 10-20 sec, 10 reps [x]     Tband Ex:   []      Shoulder extension Green, 2x10 [x] Pt inst to inc to 3x10     Shoulder IR  Green, 2x10 [x]    \"     Shoulder adduction  Green, 2x10 [x]    \"         []         []        []      LE exs:         Standing heel raises 2x10  [x] Pt inst to inc to 3x10     Standing hip abd B  2x10 B [x]   \"       []       Cervical exs:          Gentle AROM: rot B and flex/ext  Hold 3 sec, 10 reps each, to pt tolerance, no pain  [x]                 []        []       Neuromuscular Re-education:          Standing balance exs:         Feet apart, firm surface Conditions 1-3  []   Min A/CGA, cues for slow neck ROM, no pain, to pt tolerance      []       []       []          [x]      Therapeutic Exercise/Home Exercise Program:   40 minutes  HEP established, See above, reviewed, pt given handout(s) of new/re-added exs. LE exs initiated   PROM L shoulder  PROM: IR 70 @ 70 deg abd  ER 38 @ 70 deg abd, flex 125 abd 100     Group Therapy:    0 minutes    Therapeutic Activity:  0 minutes     Gait: 0 minutes    Neuromuscular Re-Education:  15 minutes  Balance exs initiated, see above     Canalith Repositioning Procedure:  0 minutes    Manual Therapy:   10 minutes   Gr I-III GH dist, inf and post glides to pt tolerance    Modalities: 0 minutes      Functional Outcome Measure:   []NA   Measure Used:  DHI  Date Assessed: 1/16/20  Score: 50    Assessment/Treatment/Activity Tolerance:    Shoulder ROM improved today   Patients response to treatment:   [x]Patient tolerated treatment well []Patient limited by fatigue   []Patient limited by pain  []Patient limited by other medical complications   []Other:     Goals:   Progress towards goals:  NA    GOALS   vestibular    Short Term Goals:   3  weeks Long Term Goals:  6  weeks   1). Establish HEP 1). Pt indep with HEP   2). Assess DHI and make LTG  MET 2). Decrease subjective complaints of dizziness and vertigo by 75% or better    3). Assess balance with DGI or Tinetti and make LTG MET 3). improve Tinetti score to 24/28 or better    4). Test for BPPV and treat if needed  4). DHI score improved to 15 or less   5). 5). Gait without AD for most distances with no significant deviations. Goals: L clavicle     Short Term Goals ( 3-4   weeks) Long Term Goals (  8 weeks)   1).    Establish HEP MET 1). (I) HEP for shoulder   2). AROM to   80 flx NOT MET   80 abd NOT MET  ER to upper trap MET 2). AROM  120 flx   110 abd     ER to C5   3). MMT to  3+ to 4- MET EXCEPT FOR ABDUCTION AND FLEXION 3). MMT 4 to 4+   4. Improve use 20%                            4.improve use 50%             12 week goals     1. mmt to 4+ to 5/5     2. AROM to 140 flx  130 abd  ER to T2     3. Improve use 75%    . Myranda Riley        Prognosis: [x]Good   []Fair   []Poor    Patient Requires Follow-up:  [x]Yes  []No    Plan: []Plan of care initiated     [x]Continue per plan of care for vestibular diagnosis and for L shoulder/clavicle     [] Alter current plan (see comments)    []Hold pending MD visit []Discharge    Timed Code Treatment Minutes:  65    Total Treatment Minutes:  65 + nustep     Medicare Cap total YTD:   [x]N/A    Workers Comp Time Stamp    Time In:  1000   Time Out: 1110    Electronically signed by:  Coral Morfin PT, OMT-C, [id number]

## 2020-01-23 ENCOUNTER — HOSPITAL ENCOUNTER (OUTPATIENT)
Dept: PHYSICAL THERAPY | Age: 65
Setting detail: THERAPIES SERIES
Discharge: HOME | End: 2020-01-23
Payer: COMMERCIAL

## 2020-01-23 PROCEDURE — 97140 MANUAL THERAPY 1/> REGIONS: CPT

## 2020-01-23 PROCEDURE — 97110 THERAPEUTIC EXERCISES: CPT

## 2020-01-23 PROCEDURE — 97112 NEUROMUSCULAR REEDUCATION: CPT

## 2020-01-23 NOTE — FLOWSHEET NOTE
[x]Yes date: 1/14/20           []No      Progress Note covers period from (if applicable):    [x]NA    []From          To           Next Progress Note due:   1/29/20    Visit# / total visits:  5/12-18 from 12/16/19 to 1/24/20     Plan for Next Session:    L clavicle: Shoulder ROM and strength, will review exs given and revise as needed  Vestibular: Once receive clearance either by MD or new CT scan, will test THE [facility] B. Initiate balance activities, eye exs as needed, gait, LE ex as needed. Subjective:   MRI is scheduled for 2/6/20 at [facility]. Pt returns to Dr. Reji Esparza 1/27/20  Pt states he felt pretty good after last visit, no trouble with HEP. Pain level: Currently at rest 4/10 neck , 0/10 L shouder. Neck pain as high as 5-6/10, L clavicle and shoulder 4/10 since last visit. AT EVAL:   Patient describes pain to be constant, Sharp, dull, aching, squeezing, throbbing and pressure  Pain location:  Neck and HA, L clavicle and shoulder  Patient reports pain is:  6/10 pain at present      8/10 pain at its worst.    Objective:   1/13/20:   PT left message for Dr. Johanne Ledezma office (spoke with Mary Young who states a nurse will call back)  1)  to request CT scan for cervical spine and to ask them to put in C9 for CT scan, 2) any precautions for doing PT on neck  3) is it ok to test THE [facility] (45 deg rotation and 20 deg extension of neck)  PT also spoke to Jorgito's @ [facility] who agrees pt needs C9 for CT scan and doesn't care if it comes from Dr. Johanne Ledezma office or she may ask Dr. Angie Singh (eye MD) for it. She will also get diagnosis code for the vestibular therapy and will also look into getting C9 to treat cervical spine . PT still has not heard back from Dr. Kolb Patient office      Exercises:    Exercises in bold performed in department today. Items not bolded are carried forward from prior visits for continuity of the record.   Exercise/Equipment Resistance/Repetitions HEP Other comments nustep  Level 4, 10 minutes [] Seat 11, arms 12, subjective x 5 min during     Shoulder exs:         pendulums 20x CW and CCW [x]      scap retraction standing with arms at sides Hold 10 sec, 2-3x10 [x]      Supine cane press up and then shoulder flexion Hold 10-20 sec, 10 reps [x]      Supine ER with cane with arm at 45 deg abd  Hold 10-20 sec, 10 reps [x]     Tband Ex:   []      Shoulder extension Green, pt doing 3x10 [x] Pt inst to inc to 3x12-15 as able     Shoulder IR    \"  \"   [x]    \"     Shoulder adduction    \"  \" [x]    \"         []         []        []      LE exs:         Standing heel raises 2x12  [x] Pt inst to inc to 3x12-15 as able     Standing hip abd B  2x12 B [x]   \"       []       Cervical exs:          Gentle AROM: rot B and flex/ext  Hold 3 sec, 10 reps each, to pt tolerance, no pain  [x]                 []        []       Neuromuscular Re-education:          Standing balance exs:         Feet apart, firm surface Conditions 1-3  []   Min A/CGA, cues for slow neck ROM, no pain, to pt tolerance      Sidestepping in // bars 3 laps up and back   [] BUE support progressing to single and then no UE support      High marching  2 laps   [] Single UE support      Standing ball toss, feet apart, firm surface  3x10  Center, Med and Lateral, sup and inferior  [] No UE support, CGA/min A at times to recover LOB          []      Therapeutic Exercise/Home Exercise Program:   30 minutes  HEP established, See above, reviewed, pt has handout(s).    PROM L shoulder  PROM: IR 70 @ 70 deg abd  ER 48 @ 70 deg abd, flex 127, abd 102     Group Therapy:    0 minutes    Therapeutic Activity:  0 minutes     Gait: 0 minutes    Neuromuscular Re-Education:  20 minutes  Balance exs, see above     Canalith Repositioning Procedure:  0 minutes    Manual Therapy:   10 minutes   Gr I-III GH dist, inf and post glides to pt tolerance    Modalities: 0 minutes      Functional Outcome Measure:   []NA   Measure Used:  [facility]  Date Assessed: 1/16/20  Score: 50    Assessment/Treatment/Activity Tolerance:    Shoulder ROM improved today   Patients response to treatment:   [x]Patient tolerated treatment well []Patient limited by fatigue   []Patient limited by pain  []Patient limited by other medical complications   []Other:     Goals:   Progress towards goals:  NA    GOALS   vestibular    Short Term Goals:   3  weeks Long Term Goals:  6  weeks   1). Establish HEP 1). Pt indep with HEP   2). Assess DHI and make LTG  MET 2). Decrease subjective complaints of dizziness and vertigo by 75% or better    3). Assess balance with DGI or Tinetti and make LTG MET 3). improve Tinetti score to 24/28 or better    4). Test for BPPV and treat if needed  4). DHI score improved to 15 or less   5). 5). Gait without AD for most distances with no significant deviations. Goals: L clavicle     Short Term Goals ( 3-4   weeks) Long Term Goals (  8 weeks)   1).    Establish HEP MET 1). (I) HEP for shoulder   2). AROM to   80 flx NOT MET   80 abd NOT MET  ER to upper trap MET 2). AROM  120 flx   110 abd     ER to C5   3). MMT to  3+ to 4- MET EXCEPT FOR ABDUCTION AND FLEXION 3). MMT 4 to 4+   4. Improve use 20%                            4.improve use 50%             12 week goals     1. mmt to 4+ to 5/5     2. AROM to 140 flx  130 abd  ER to T2     3. Improve use 75%    . Emma Erwin        Prognosis: [x]Good   []Fair   []Poor    Patient Requires Follow-up:  [x]Yes  []No    Plan: []Plan of care initiated     [x]Continue per plan of care for vestibular diagnosis and for L shoulder/clavicle     [] Alter current plan (see comments)    []Hold pending MD visit []Discharge    Timed Code Treatment Minutes:  60    Total Treatment Minutes:  60 + nustep     Medicare Cap total YTD:   [x]N/A    Workers Comp Time Stamp    Time In:  1000   Time Out: 1110    Electronically signed by:  Sherine Burger PT, OMT-C, [id number]

## 2020-01-27 ENCOUNTER — HOSPITAL ENCOUNTER (OUTPATIENT)
Dept: PHYSICAL THERAPY | Age: 65
Setting detail: THERAPIES SERIES
Discharge: HOME | End: 2020-01-27
Payer: COMMERCIAL

## 2020-01-27 ENCOUNTER — APPOINTMENT (OUTPATIENT)
Dept: PHYSICAL THERAPY | Age: 65
End: 2020-01-27
Payer: COMMERCIAL

## 2020-01-27 PROCEDURE — 97140 MANUAL THERAPY 1/> REGIONS: CPT

## 2020-01-27 PROCEDURE — 97110 THERAPEUTIC EXERCISES: CPT

## 2020-01-27 PROCEDURE — 97112 NEUROMUSCULAR REEDUCATION: CPT

## 2020-01-27 NOTE — PROGRESS NOTES
Physical Therapy  Left voicemail with  requesting date extension for PT. Spoke with patient's wife who will be seeing  later today. She will also ask about date extension, and have  call us.

## 2020-01-27 NOTE — FLOWSHEET NOTE
Outpatient Physical Therapy     [x] Daily Treatment Note   [] Progress Note   [] Discharge Note    Date:  1/27/2020    Patient Name:  Spencer Saint  \"Fili\"  Wife \"Tarah\"        YOB: 1955    Medical Diagnosis:  Vestibular therapy per  (no diagnosis, dept checking with [address] [place] [address] for diagosis and code) /L clavicle ORIF with non-union                                                     ICD 10:  vestibular Not on C9  / S42.022K     Treatment Diagnosis:  Dizziness/vertigo, imbalance, abnormality of gait, L shoulder/arm pain, weakness, decreased ROM     Onset Date:    2/14/19                 Referral Date:  12/6/19 (date of C9 for vestibular)   / 12/16/19  (date for L clavicle)       Referring Physician:  Dr. Martha Borja (neurosurgeon- [phone])  / Dr. Josh Yarbrough ( ortho [phone])                                                    Visits Allowed/Insurance/Certification Information:  [place] [id number] approved for treatment as needed for vestibular and L clavicle from 12/16/19 to   Extended until 2/24/20, 2-3x/week x 4-6 weeks     Restrictions/Precautions:  also had FX of C1 and C2 and  fx of occ condyle (last CT in August 2019 still healing)      fx ribs 3-10.     Had a collapsed lung and concussion. Tachycardia per wife since accident, on meds. HTN, DM, ORIF L clavicle 11/4/19, Initial Restrictions: no flx above 90, no lifting above 5#.   Per phone conversation with Dr. Lupe Carcamo office on 1/13/20, activity as tolerated per MD note on 12/16/19     Plan of care sent to provider:   [x]Faxed to Dr. Cesar Mauricio  []Co-signature    (attempts: 1[x] 2 []3[])         Plan of care signed:      [x]Yes date: 1/14/20           []No      Progress Note covers period from (if applicable):    [x]NA    []From          To           Next Progress Note due:   1/29/20    Visit# / total visits:  6/12-18 from 12/16/19 to 1/24/20     Plan for Next Session:    L clavicle: Shoulder ROM and strength, will review exs given viewing, seated, close target  Pt inst to do this exercise x 1 minute with horizontal head turns and x 1 minute with vertical head nods. Pt to only go as fast as he/she can while keeping target clear and in focus. Pt inst to do this ex 3x/day. [x]   \"  \"  \"     Gait exercise  Pt inst to take a walk for 10-20 minutes 1x daily, preferable outdoors. [x]          []      Therapeutic Exercise/Home Exercise Program:   25 minutes  HEP established, See above, reviewed, pt given handout(s) of new ex. PROM L shoulder  PROM: IR 70 @ 85 deg abd  ER 74 @ 85 deg abd, flex 140, abd 105     Group Therapy:    0 minutes    Therapeutic Activity:  0 minutes     Gait: 0 minutes    Neuromuscular Re-Education:  30 minutes  Pt educated on purpose of gaze stability exs  see above, initiated gaze stability exs     Canalith Repositioning Procedure:  0 minutes    Manual Therapy:   10 minutes   Gr I-III GH dist, inf and post glides to pt tolerance    Modalities: 0 minutes      Functional Outcome Measure:   []NA   Measure Used:  [facility]  Date Assessed: 1/16/20  Score: 50    Assessment/Treatment/Activity Tolerance:    Shoulder ROM improved today   Patients response to treatment:   [x]Patient tolerated treatment well []Patient limited by fatigue   []Patient limited by pain  []Patient limited by other medical complications   []Other:     Goals:   Progress towards goals:  NA    GOALS   vestibular    Short Term Goals:   3  weeks Long Term Goals:  6  weeks   1). Establish HEP 1). Pt indep with HEP   2). Assess DHI and make LTG  MET 2). Decrease subjective complaints of dizziness and vertigo by 75% or better    3). Assess balance with DGI or Tinetti and make LTG MET 3). improve Tinetti score to 24/28 or better    4). Test for BPPV and treat if needed  4). DHI score improved to 15 or less   5). 5). Gait without AD for most distances with no significant deviations.       Goals: L clavicle     Short Term Goals ( 3-4   weeks) Long Term

## 2020-01-30 ENCOUNTER — HOSPITAL ENCOUNTER (OUTPATIENT)
Dept: PHYSICAL THERAPY | Age: 65
Setting detail: THERAPIES SERIES
Discharge: HOME | End: 2020-01-30
Payer: COMMERCIAL

## 2020-01-30 PROCEDURE — 97140 MANUAL THERAPY 1/> REGIONS: CPT

## 2020-01-30 PROCEDURE — 97110 THERAPEUTIC EXERCISES: CPT

## 2020-01-30 PROCEDURE — 97112 NEUROMUSCULAR REEDUCATION: CPT

## 2020-01-30 NOTE — FLOWSHEET NOTE
Outpatient Physical Therapy     [x] Daily Treatment Note   [] Progress Note   [] Discharge Note    Date:  1/30/2020    Patient Name:  Rima Goddard  \"Fili\"  Wife \"Tarah\"        YOB: 1955    Medical Diagnosis:  Vestibular therapy per C9 (no diagnosis, dept checking with [facility] for diagosis and code) /L clavicle ORIF with non-union                                                     ICD 10:  vestibular Not on C9  / S42.022K     Treatment Diagnosis:  Dizziness/vertigo, imbalance, abnormality of gait, L shoulder/arm pain, weakness, decreased ROM     Onset Date:    2/14/19                 Referral Date:  12/6/19 (date of C9 for vestibular)   / 12/16/19  (date for L clavicle)       Referring Physician:  Dr. Marco A Sanchez (neurosurgeon- [phone])  / Dr. Kim Carballo ( ortho [phone])                                                    Visits Allowed/Insurance/Certification Information:  [facility],  approved for treatment as needed for vestibular and L clavicle from 12/16/19 to   Extended until 2/24/20, 2-3x/week x 4-6 weeks     Restrictions/Precautions:  also had FX of C1 and C2 and  fx of occ condyle (last CT in August 2019 still healing)      fx ribs 3-10.     Had a collapsed lung and concussion. Tachycardia per wife since accident, on meds. HTN, DM, ORIF L clavicle 11/4/19, Initial Restrictions: no flx above 90, no lifting above 5#.   Per phone conversation with Dr. Hector Loya office on 1/13/20, activity as tolerated per MD note on 12/16/19     Plan of care sent to provider:   [x]Faxed to Dr. Laly Simmons  []Co-signature    (attempts: 1[x] 2 []3[])         Plan of care signed:      [x]Yes date: 1/14/20           []No      Progress Note covers period from (if applicable):    [x]NA    []From          To           Next Progress Note due:   1/29/20    Visit# / total visits:  7/12-18 from 12/16/19 to 1/24/20     Plan for Next Session:    L clavicle: Shoulder ROM and strength, will review exs given and revise as needed  Vestibular: Once receive clearance either by MD or new CT scan, will test THE [facility] B. Initiate balance activities, eye exs as needed, gait, LE ex as needed. Subjective:   Pt states B shoulders are sore today, thinks it is from doing his press up/flexion stretch  Pt states he was also lying on floor on his back trying to see why some lights were out on the emil tree. Pt states he has been doing eye exs   MRI is scheduled for 2/6/20 at [facility]. Dr. Marcella Mckeon is going to call pt with results. MRI being done of brain and cervical spine. Pt returns to Dr. Chika Padilla 3/30/20    Pain level: Currently at rest 4/10 neck mostly R side , 4/10 B ant shoulders  AT EVAL:   Patient describes pain to be constant, Sharp, dull, aching, squeezing, throbbing and pressure  Pain location:  Neck and HA, L clavicle and shoulder  Patient reports pain is:  6/10 pain at present      8/10 pain at its worst.    Objective:   1/13/20:   PT left message for Dr. Cherry Police office (spoke with [place] Scott who states a nurse will call back)  1)  to request CT scan for cervical spine and to ask them to put in C9 for CT scan, 2) any precautions for doing PT on neck  3) is it ok to test THE [facility] (45 deg rotation and 20 deg extension of neck)  PT also spoke to Jorgito's @ [facility] who agrees pt needs C9 for CT scan and doesn't care if it comes from Dr. Cherry Police office or she may ask Dr. Marcella Mckeon (eye MD) for it. She will also get diagnosis code for the vestibular therapy and will also look into getting C9 to treat cervical spine . PT still has not heard back from Dr. Claire Lizama office      Exercises:    Exercises in bold performed in department today. Items not bolded are carried forward from prior visits for continuity of the record.   Exercise/Equipment Resistance/Repetitions HEP Other comments     nustep  Level 5, 10 minutes [] Seat 11, arms 12, subjective x 5 min during     Shoulder exs:         pendulums 20x CW AD for most distances with no significant deviations. Goals: L clavicle     Short Term Goals ( 3-4   weeks) Long Term Goals (  8 weeks)   1).    Establish HEP MET 1). (I) HEP for shoulder   2). AROM to   80 flx NOT MET   80 abd NOT MET  ER to upper trap MET 2). AROM  120 flx   110 abd     ER to C5   3). MMT to  3+ to 4- MET EXCEPT FOR ABDUCTION AND FLEXION 3). MMT 4 to 4+   4. Improve use 20%                            4.improve use 50%             12 week goals     1. mmt to 4+ to 5/5     2. AROM to 140 flx  130 abd  ER to T2     3. Improve use 75%    . Tyrell Mobley        Prognosis: [x]Good   []Fair   []Poor    Patient Requires Follow-up:  [x]Yes  []No    Plan: []Plan of care initiated     [x]Continue per plan of care for vestibular diagnosis and for L shoulder/clavicle     [] Alter current plan (see comments)    []Hold pending MD visit []Discharge    Timed Code Treatment Minutes:  65    Total Treatment Minutes:  65 + nustep     Medicare Cap total YTD:   [x]N/A    Workers Comp Time Stamp    Time In:  1100   Time Out: 1215      Electronically signed by:  Don Harvey, [address] [facility], T-C, [id number]

## 2020-02-03 ENCOUNTER — HOSPITAL ENCOUNTER (OUTPATIENT)
Dept: PHYSICAL THERAPY | Age: 65
Setting detail: THERAPIES SERIES
Discharge: HOME | End: 2020-02-03
Payer: COMMERCIAL

## 2020-02-03 PROCEDURE — 97112 NEUROMUSCULAR REEDUCATION: CPT

## 2020-02-03 PROCEDURE — 97110 THERAPEUTIC EXERCISES: CPT

## 2020-02-03 PROCEDURE — 97140 MANUAL THERAPY 1/> REGIONS: CPT

## 2020-02-03 NOTE — FLOWSHEET NOTE
Outpatient Physical Therapy     [x] Daily Treatment Note   [] Progress Note   [] Discharge Note    Date:  2/3/2020    Patient Name:  Wanda Peralta  \"Fili\"  Wife \"Tarah\"        YOB: 1955    Medical Diagnosis:  Vestibular therapy per C9 (no diagnosis, dept checking with [facility] for diagosis and code) /L clavicle ORIF with non-union                                                     ICD 10:  vestibular Not on C9  / S42.022K     Treatment Diagnosis:  Dizziness/vertigo, imbalance, abnormality of gait, L shoulder/arm pain, weakness, decreased ROM     Onset Date:    2/14/19                 Referral Date:  12/6/19 (date of C9 for vestibular)   / 12/16/19  (date for L clavicle)       Referring Physician:  Dr. Haydee Luna (neurosurgeon- [phone])  / Dr. Klever Linares ( ortho [phone])                                                    Visits Allowed/Insurance/Certification Information:  [facility],  approved for treatment as needed for vestibular and L clavicle from 12/16/19 to   Extended until 2/24/20, 2-3x/week x 4-6 weeks     Restrictions/Precautions:  also had FX of C1 and C2 and  fx of occ condyle (last CT in August 2019 still healing)      fx ribs 3-10.     Had a collapsed lung and concussion. Tachycardia per wife since accident, on meds. HTN, DM, ORIF L clavicle 11/4/19, Initial Restrictions: no flx above 90, no lifting above 5#.   Per phone conversation with Dr. Aby Berg office on 1/13/20, activity as tolerated per MD note on 12/16/19     Plan of care sent to provider:   [x]Faxed to Dr. Arti Martinez  []Co-signature    (attempts: 1[x] 2 []3[])         Plan of care signed:      [x]Yes date: 1/14/20           []No      Progress Note covers period from (if applicable):    [x]NA    []From          To           Next Progress Note due:   Next visit on 2/5/20    Visit# / total visits:  8/12-18 from 12/16/19 to extended until 2/24/20    Plan for Next Session:    L clavicle: Shoulder ROM and strength, will review exs given and revise as needed  Vestibular: Once receive clearance either by MD or new CT scan, will test THE [facility] B. Initiate balance activities, eye exs as needed, gait, LE ex as needed. Subjective: Wife states pt's work can take pt back for a light duty position when pt is ready, but pt would have to be able to drive to get to work. 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 Wife states pt did try getting in car in their driveway to check her engine light out, was able to use rear view mirror but not able to look into side mirrors. MRI is scheduled for 2/6/20 at [facility]. Dr. Angie Singh is going to call pt with results. MRI being done of brain and cervical spine. Pt returns to Dr. Reji Esparza 3/30/20    Pain level: Currently 4/10 neck, 4/10 R shoulder/upper arm  AT EVAL:   Patient describes pain to be constant, Sharp, dull, aching, squeezing, throbbing and pressure  Pain location:  Neck and HA, L clavicle and shoulder  Patient reports pain is:  6/10 pain at present      8/10 pain at its worst.    Objective:   1/13/20:   PT left message for Dr. Johanne Ledezma office (spoke with [place] who states a nurse will call back)  1)  to request CT scan for cervical spine and to ask them to put in C9 for CT scan, 2) any precautions for doing PT on neck  3) is it ok to test THE [facility] (45 deg rotation and 20 deg extension of neck)  PT also spoke to Jorgito's @ [facility] who agrees pt needs C9 for CT scan and doesn't care if it comes from Dr. Johanne Ledezma office or she may ask Dr. Angie Singh (eye MD) for it. She will also get diagnosis code for the vestibular therapy and will also look into getting C9 to treat cervical spine . PT still has not heard back from Dr. Kolb Patient office      Exercises:    Exercises in bold performed in department today. Items not bolded are carried forward from prior visits for continuity of the record.   Exercise/Equipment Resistance/Repetitions HEP Other comments     nustep  Level 5, 10 minutes [] Seat 11, arms 12, subjective x 5 min during     scifit  Level 2, 10 minutes  Seat 7, with inner arms, subjective x 5 min during      Shoulder exs:         pendulums Verbally reviewed (VR) 20x CW and CCW [x]      scap retraction standing with arms at sides VR, Hold 10 sec, 2-3x10 [x]      Supine cane press up and then shoulder flexion VR, Hold 10-20 sec, 10 reps [x]      Supine ER with cane with arm at 45 deg abd  VR, Hold 10-20 sec, 10 reps [x]     Tband Ex:   []      Shoulder extension Verbally reviewed, pt states he hasn't been doing these, got confused and thought PT said to not do these, PT confirmed pt IS to be doing these Green,  3x10, 1x/day [x] Pt inst to inc to 3x12-15 as able     Shoulder IR    \"  \"   [x]    \"     Shoulder adduction    \"  \" [x]    \"     Active shoulder flexion supine    Reviewed, 2x10 reps [x]       Serratus ceiling punch  1#, 3x10-15, 1x/day [x]        []      LE exs:         Standing heel raises Verbally reviewed, pt states doing 3x15  [x] Pt inst to inc to 3x12-15 as able     Standing hip abd B  Verbally reviewed, pt states doing 3x15 B [x]   \"       []       Cervical exs:          Gentle AROM: rot B and flex/ext  Reviewed, Hold 3 sec, 10 reps each, to pt tolerance, no pain, suggested to pt to do these prior to doing eye exs to warm up neck   [x]                    []        []       Neuromuscular Re-education:          Standing balance exs:         Feet apart, firm surface Conditions 1-3  []   Min A/CGA, cues for slow neck ROM, no pain, to pt tolerance      Sidestepping in // bars 3 laps up and back   [] BUE support progressing to single and then no UE support      High marching  2 laps   [] Single UE support      Standing ball toss, feet apart, firm surface  3x10  Center, Med and Lateral, sup and inferior  [] No UE support, CGA/min A at times to recover LOB             Gaze stability Ex:          VOR x 1 viewing, progressing to to pt tolerance    Modalities: 0 minutes      Functional Outcome Measure:   []NA   Measure Used:  DHI  Date Assessed: 1/16/20  Score: 50    Assessment/Treatment/Activity Tolerance:     ROM improved today. Patients response to treatment:   [x]Patient tolerated treatment well []Patient limited by fatigue  []Patient limited by pain  []Patient limited by other medical complications   []Other:     Goals:   Progress towards goals:  NA    GOALS   vestibular    Short Term Goals:   3  weeks Long Term Goals:  6  weeks   1). Establish HEP 1). Pt indep with HEP   2). Assess DHI and make LTG  MET 2). Decrease subjective complaints of dizziness and vertigo by 75% or better    3). Assess balance with DGI or Tinetti and make LTG MET 3). improve Tinetti score to 24/28 or better    4). Test for BPPV and treat if needed  4). DHI score improved to 15 or less   5). 5). Gait without AD for most distances with no significant deviations. Goals: L clavicle     Short Term Goals ( 3-4   weeks) Long Term Goals (  8 weeks)   1).    Establish HEP MET 1). (I) HEP for shoulder   2). AROM to   80 flx NOT MET   80 abd NOT MET  ER to upper trap MET 2). AROM  120 flx   110 abd     ER to C5   3). MMT to  3+ to 4- MET EXCEPT FOR ABDUCTION AND FLEXION 3). MMT 4 to 4+   4. Improve use 20%                            4.improve use 50%             12 week goals     1. mmt to 4+ to 5/5     2. AROM to 140 flx  130 abd  ER to T2     3. Improve use 75%    . Alex Walker        Prognosis: [x]Good   []Fair   []Poor    Patient Requires Follow-up:  [x]Yes  []No    Plan: []Plan of care initiated     [x]Continue per plan of care for vestibular diagnosis and for L shoulder/clavicle     [] Alter current plan (see comments)    []Hold pending MD visit []Discharge    Timed Code Treatment Minutes:  65    Total Treatment Minutes:  65 + nustep     Medicare Cap total YTD:   [x]N/A    Workers Comp Time Stamp    Time In:  1100   Time Out: 0763      Electronically signed by:

## 2020-02-05 ENCOUNTER — HOSPITAL ENCOUNTER (OUTPATIENT)
Dept: PHYSICAL THERAPY | Age: 65
Setting detail: THERAPIES SERIES
Discharge: HOME | End: 2020-02-05
Payer: COMMERCIAL

## 2020-02-05 PROCEDURE — 97112 NEUROMUSCULAR REEDUCATION: CPT

## 2020-02-05 PROCEDURE — 97110 THERAPEUTIC EXERCISES: CPT

## 2020-02-05 NOTE — PROGRESS NOTES
Outpatient Physical Therapy     [x] Daily Treatment Note   [x] Progress Note   [] Discharge Note    Date:  2/5/2020    Patient Name:  Monica Cueto  \"Fili\"  Wife \"Tarah\"        YOB: 1955    Medical Diagnosis:  Vestibular therapy per C9 (no diagnosis, dept checking with [facility] for diagosis and code) /L clavicle ORIF with non-union                                                     ICD 10:  vestibular Not on C9  / S42.022K     Treatment Diagnosis:  Dizziness/vertigo, imbalance, abnormality of gait, L shoulder/arm pain, weakness, decreased ROM     Onset Date:    2/14/19                 Referral Date:  12/6/19 (date of C9 for vestibular)   / 12/16/19  (date for L clavicle)       Referring Physician:  Dr. Lacey Alcantar (neurosurgeon- [phone])  / Dr. Thanh Floyd ( ortho [phone])                                                    Visits Allowed/Insurance/Certification Information:  [facility],  approved for treatment as needed for vestibular and L clavicle from 12/16/19 to   Extended until 2/24/20, 2-3x/week x 4-6 weeks     Restrictions/Precautions:  also had FX of C1 and C2 and  fx of occ condyle (last CT in August 2019 still healing)      fx ribs 3-10.     Had a collapsed lung and concussion. Tachycardia per wife since accident, on meds. HTN, DM, ORIF L clavicle 11/4/19, Initial Restrictions: no flx above 90, no lifting above 5#.   Per phone conversation with Dr. Nury Sabillon office on 1/13/20, activity as tolerated per MD note on 12/16/19     Plan of care sent to provider:   [x]Faxed to Dr. Jinny Lopez  []Co-signature    (attempts: 1[x] 2 []3[])         Plan of care signed:      [x]Yes date: 1/14/20           []No      Progress Note covers period from (if applicable):    []NA    [x]From 1/8/20 To 2/5/20           Next Progress Note due:   On 2/24/20    Visit# / total visits:  9/12-18 from 12/16/19 to extended until 2/24/20    Plan for Next Session:    L clavicle: Shoulder ROM and strength, head turns and x 1 minute with vertical head nods. Pt to only go as fast as he/she can while keeping target clear and in focus. Pt inst to do this ex 3x/day. [x] Pt inst to do within limits of neck ROM and to not make neck hurt. VOR x 1 viewing, progressing to standing this week, close target, with wife present for safety. VR, Pt inst to cont this exercise x 1 minute with horizontal head turns and x 1 minute with vertical head nods. Pt to only go as fast as he/she can while keeping target clear and in focus. Pt inst to do this ex 3x/day. [x]   \"  \"  \"     Gait exercise  VR, Pt inst to take a walk for 10-20 minutes 1x daily, preferable outdoors. [x]      Saccade exercise seated, x and z targets close together (6 inches apart)  VR, Pt inst to do this exercise x 1 minute in horizontal direction and  x 1 minute in vertical direction. Pt to only go as fast as he/she can while keeping target clear and in focus. Pt inst to do this ex 3x/day. [x] Pt inst to try to move eyes from one target to other with just one jump of the eyes. Ankle strategy /weight shifts ant/post 20-30 reps   [] CGA/min A x 1                []      Therapeutic Exercise/Home Exercise Program:   25 minutes  HEP established, See above, reviewed, pt given handout(s) of new ex. Pt inst to do shoulder stretches 2x/day and strength exs 1x/day. Discussed possibly looking for hand weights when out shopping, PT suggested a 2#, a 3#, and a 5# weight.      PROM L shoulder  PROM: IR 70 @ 90 deg abd,  ER 60 @ 90 deg abd, flex 150, abd 113   AROM: FLEXION AND ABD TO AT LEAST  80 DEG TODAY WITH CONE REACH EX ABOVE  Strength: NT today, no time     Group Therapy:    0 minutes    Therapeutic Activity:  0 minutes     Gait: 5 minutes  Assessed Tinetti gait  Balance Score: 7  Gait Score: 8  Tinetti Total Score: 15    Neuromuscular Re-Education:  24 minutes  see above,  gaze stability exs, saccades  Pt inst to do eye exs 3x/day  Initiated ankle strategies- see above   PT notes pt with significant posterior leaning and LOB today with standing balance exs and lack of ankle or hip strategy  Assessed Tinetti balance, see score above       Canalith Repositioning Procedure:  0 minutes     Manual Therapy:   5 minutes   Gr I-III GH dist, inf and post glides to pt tolerance    Modalities: 0 minutes      Functional Outcome Measure:   []NA   FORGOT TO REASSESS TODAY  Measure Used:  [address]  Date Assessed: 1/16/20  Score: 50    Assessment/Treatment/Activity Tolerance:    Pt making progress with PT. Shoulder active and passive ROM has improved and pt progressing with strength exs. Vestibular therapy progressing but more slowly. Patients response to treatment:   [x]Patient tolerated treatment well []Patient limited by fatigue  []Patient limited by pain  []Patient limited by other medical complications   []Other:     Goals:   Progress towards goals:  Goals met as marked below per progress note on 2/5/20    GOALS   vestibular    Short Term Goals:   3  weeks Long Term Goals:  6  weeks   1). Establish HEP MET 1). Pt indep with HEP   2). Assess DHI and make LTG  MET 2). Decrease subjective complaints of dizziness and vertigo by 75% or better    3). Assess balance with DGI or Tinetti and make LTG MET 3). improve Tinetti score to 24/28 or better    4). Test for BPPV and treat if needed PARTIALLY MET, NEEDS TO BE ASSESSED AGAIN ONCE MRI CLEARS CERVICAL SPINE 4). DHI score improved to 15 or less   5). 5). Gait without AD for most distances with no significant deviations. Goals: L clavicle     Short Term Goals ( 3-4   weeks) Long Term Goals (  8 weeks)   1).    Establish HEP MET 1). (I) HEP for shoulder   2). AROM to   80 flx  MET   80 abd  MET  ER to upper trap MET 2). AROM  120 flx   110 abd     ER to C5   3). MMT to  3+ to 4- MET EXCEPT FOR ABDUCTION AND FLEXION 3). MMT 4 to 4+   4.  Improve use 20%                            4.improve use 50%             12 week goals     1. mmt to 4+ to 5/5     2. AROM to 140 flx  130 abd  ER to T2     3. Improve use 75%    . Jose Patricia        Prognosis: [x]Good   []Fair   []Poor    Patient Requires Follow-up:  [x]Yes  []No    Plan: []Plan of care initiated     [x]Continue per plan of care for vestibular diagnosis and for L shoulder/clavicle     [] Alter current plan (see comments)    []Hold pending MD visit []Discharge    Timed Code Treatment Minutes:  59    Total Treatment Minutes:  59 +  (10 min nustep independent, 5 min nustep subjective interview)     Medicare Cap total YTD:   [x]N/A    Workers Comp Time Stamp    Time In:  2924   Time Out: 0237       Electronically signed by:  Cathy Blunt, OMT-C, [id number]

## 2020-02-10 ENCOUNTER — HOSPITAL ENCOUNTER (OUTPATIENT)
Dept: PHYSICAL THERAPY | Age: 65
Setting detail: THERAPIES SERIES
Discharge: HOME | End: 2020-02-10
Payer: COMMERCIAL

## 2020-02-10 PROCEDURE — 97110 THERAPEUTIC EXERCISES: CPT

## 2020-02-10 PROCEDURE — 97112 NEUROMUSCULAR REEDUCATION: CPT

## 2020-02-10 NOTE — FLOWSHEET NOTE
with horizontal head turns and x 1 minute with vertical head nods. Pt to only go as fast as he/she can while keeping target clear and in focus. Pt inst to do this ex 3x/day. [x] Pt inst to do within limits of neck ROM and to not make neck hurt. VOR x 1 viewing, progressing to standing this week, close target, with wife present for safety. VR, Pt inst to cont this exercise x 1 minute with horizontal head turns and x 1 minute with vertical head nods. Pt to only go as fast as he/she can while keeping target clear and in focus. Pt inst to do this ex 3x/day. [x]   \"  \"  \"     Gait exercise  VR, Pt inst to take a walk for 10-20 minutes 1x daily, preferable outdoors. [x]      Saccade exercise seated, x and z targets close together (6 inches apart)  VR, Pt inst to do this exercise x 1 minute in horizontal direction and  x 1 minute in vertical direction. Pt to only go as fast as he/she can while keeping target clear and in focus. Pt inst to do this ex 3x/day. [x] Pt inst to try to move eyes from one target to other with just one jump of the eyes. Pre-Ankle strategy /weight shifts ant/post 20-30 reps   [] CGA      Ankle strategies anteriorly and posteriorly with perturbations from PT 10 reps in each direction   No LOB, pt did well, able to initiate toe extension and ankle PF with ex with no UE support, CGA      Stepping strategies anteriorly and posteriorly with perturbations from PT  10 reps in each direction   No LOB, pt able to perform stepping strategy with good form and no UE support, CGA                             []      Therapeutic Exercise/Home Exercise Program:   30 minutes  HEP established, See above, reviewed, pt has handout(s) of ex. Pt inst to do shoulder stretches 2x/day and strength exs 1x/day. Verbally reviewed, no complaints.    PT reviewed MRIs with pt and wife, educated on spine anatomy and physiology  Discussed plan to make appt with Dr. Abran Valdes to go over findings and ask for most appropriate imaging test to visualize upper cervical spine fractures to make sure they are healed (open mouth xray/odontoid view vs CT scan)     Group Therapy:    0 minutes    Therapeutic Activity:  0 minutes     Gait: 0 minutes    Neuromuscular Re-Education: 35  minutes  see above  Pt inst to do eye exs 3x/day    Canalith Repositioning Procedure:  0 minutes     Manual Therapy:   0 minutes  No time today      Modalities: 0 minutes      Functional Outcome Measure:   []NA     Measure Used:  DHI  Date Assessed: 1/16/20  Score: 50    Assessment/Treatment/Activity Tolerance:  Improvement noted today with standing balance activities       Patients response to treatment:   [x]Patient tolerated treatment well []Patient limited by fatigue  []Patient limited by pain  []Patient limited by other medical complications   []Other:     Goals:   Progress towards goals:  Goals met as marked below per progress note on 2/5/20    GOALS   vestibular    Short Term Goals:   3  weeks Long Term Goals:  6  weeks   1). Establish HEP MET 1). Pt indep with HEP   2). Assess DHI and make LTG  MET 2). Decrease subjective complaints of dizziness and vertigo by 75% or better    3). Assess balance with DGI or Tinetti and make LTG MET 3). improve Tinetti score to 24/28 or better    4). Test for BPPV and treat if needed PARTIALLY MET, NEEDS TO BE ASSESSED AGAIN ONCE MRI CLEARS CERVICAL SPINE 4). DHI score improved to 15 or less   5). 5). Gait without AD for most distances with no significant deviations. Goals: L clavicle     Short Term Goals ( 3-4   weeks) Long Term Goals (  8 weeks)   1).    Establish HEP MET 1). (I) HEP for shoulder   2). AROM to   80 flx  MET   80 abd  MET  ER to upper trap MET 2). AROM  120 flx   110 abd     ER to C5   3). MMT to  3+ to 4- MET EXCEPT FOR ABDUCTION AND FLEXION 3). MMT 4 to 4+   4. Improve use 20%                            4.improve use 50%             12 week goals     1. mmt to 4+ to 5/5     2. AROM to 140 flx  130 abd  ER to T2     3. Improve use 75%    . Alvaro Corona        Prognosis: [x]Good   []Fair   []Poor    Patient Requires Follow-up:  [x]Yes  []No    Plan: []Plan of care initiated     [x]Continue per plan of care for vestibular diagnosis and for L shoulder/clavicle     [] Alter current plan (see comments)    []Hold pending MD visit []Discharge    Timed Code Treatment Minutes:  65    Total Treatment Minutes:  65 +  (10 min nustep independent, 5 min nustep subjective interview)     Medicare Cap total YTD:   [x]N/A    Workers Comp Time Stamp    Time In:  3102   Time Out: 1210  (10 minute nustep independent, only charged for 65 minutes)        Electronically signed by:  Cathy Reyes, OMT-C, [id number]

## 2020-02-12 ENCOUNTER — HOSPITAL ENCOUNTER (OUTPATIENT)
Dept: PHYSICAL THERAPY | Age: 65
Setting detail: THERAPIES SERIES
Discharge: HOME | End: 2020-02-12
Payer: COMMERCIAL

## 2020-02-12 PROCEDURE — 97140 MANUAL THERAPY 1/> REGIONS: CPT

## 2020-02-12 PROCEDURE — 97110 THERAPEUTIC EXERCISES: CPT

## 2020-02-12 NOTE — FLOWSHEET NOTE
review exs given and revise as needed  Vestibular: Once receive clearance either by MD or new CT scan, will test THE [facility] B. Initiate balance activities, eye exs as needed, gait, LE ex as needed. Subjective:  Pt seeing Dr. Cayla Arroyo on Tuesday, 2/18/20   Pt returns to Dr. Jennifer Carvajal 3/30/20  No new complaints       Pain level: Currently 4/10 neck, neck pain as high as 6/10 in past few days, 0/10 L shoulder/upper arm, L shoulder pain as high as 4/10 with stretching exs  AT EVAL:   Patient describes pain to be constant, Sharp, dull, aching, squeezing, throbbing and pressure  Pain location:  Neck and HA, L clavicle and shoulder  Patient reports pain is:  6/10 pain at present      8/10 pain at its worst.    Objective:   1/13/20:   PT left message for Dr. Zachery Hall office (spoke with Ita Chirinos who states a nurse will call back)  1)  to request CT scan for cervical spine and to ask them to put in C9 for CT scan, 2) any precautions for doing PT on neck  3) is it ok to test THE [facility] (45 deg rotation and 20 deg extension of neck)  PT also spoke [place] [facility] [place]9 for CT scan and doesn't care if it comes from Dr. Zachery Hall office or she may ask Dr. Anne Magallanes (eye MD) for it. She will also get diagnosis code for the vestibular therapy and will also look into getting C9 to treat cervical spine . PT still has not heard back from Dr. Carito Christian office      Exercises:    Exercises in bold performed in department today. Items not bolded are carried forward from prior visits for continuity of the record.   VR = verbally reviewed  Exercise/Equipment Resistance/Repetitions HEP Other comments     nustep  Level 5, 10 minutes [] Seat 11, arms 12, subjective x 5 min during     scifit  Level 2, 15 minutes  Pt educated on how to start machine and adjust level   Seat 7, with inner arms,       Shoulder exs:         pendulums Verbally reviewed (VR) 20x CW and CCW [x]      scap retraction standing with arms at sides support, CGA/min A at times to recover LOB             Gaze stability Ex:           VOR x 1 viewing, progressing to standing this week, far target, with wife present for safety VR, Pt inst to cont this exercise x 1 minute with horizontal head turns and x 1 minute with vertical head nods. Pt to only go as fast as he/she can while keeping target clear and in focus. Pt inst to do this ex 3x/day. [x] Pt inst to do within limits of neck ROM and to not make neck hurt. VOR x 1 viewing, progressing to standing this week, close target, with wife present for safety. VR, Pt inst to cont this exercise x 1 minute with horizontal head turns and x 1 minute with vertical head nods. Pt to only go as fast as he/she can while keeping target clear and in focus. Pt inst to do this ex 3x/day. [x]   \"  \"  \"     Gait exercise  VR, Pt inst to take a walk for 10-20 minutes 1x daily, preferable outdoors. [x]      Saccade exercise seated, x and z targets close together (6 inches apart)  VR, Pt inst to do this exercise x 1 minute in horizontal direction and  x 1 minute in vertical direction. Pt to only go as fast as he/she can while keeping target clear and in focus. Pt inst to do this ex 3x/day. [x] Pt inst to try to move eyes from one target to other with just one jump of the eyes. Pre-Ankle strategy /weight shifts ant/post 20-30 reps   [] CGA      Ankle strategies anteriorly and posteriorly with perturbations from PT 10 reps in each direction   No LOB, pt did well, able to initiate toe extension and ankle PF with ex with no UE support, CGA      Stepping strategies anteriorly and posteriorly with perturbations from PT  10 reps in each direction   No LOB, pt able to perform stepping strategy with good form and no UE support, CGA                             []      Therapeutic Exercise/Home Exercise Program:   44 minutes  HEP established, See above, reviewed, pt given handout(s) of new ex.    Pt inst to do shoulder Term Goals [place])   1).    Establish HEP MET 1). (I) HEP for shoulder   2). AROM to   80 flx  MET   80 abd  MET  ER to upper trap MET 2). AROM  120 flx   110 abd     ER to C5   3). MMT to  3+ to 4- MET EXCEPT FOR ABDUCTION AND FLEXION 3). MMT 4 to 4+   4. Improve use 20%                            4.improve use 50%             12 week goals     1. mmt to 4+ to 5/5     2. AROM to 140 flx  130 abd  ER to T2     3. Improve use 75%    . Bia Yadav        Prognosis: [x]Good   []Fair   []Poor    Patient Requires Follow-up:  [x]Yes  []No    Plan: []Plan of care initiated     [x]Continue per plan of care for vestibular diagnosis and for L shoulder/clavicle     [] Alter current plan (see comments)    []Hold pending MD visit []Discharge    Timed Code Treatment Minutes:  54    Total Treatment Minutes:  54 +  (15 min nustep independent prior to appt)     Medicare Cap total YTD:   [x]N/A    Workers Comp Time Stamp    Time In:  1108   Time Out: 8878  (+ 15 minute nustep independent prior to appt)        Electronically signed by:  Allyn Brewer, [address] [facility], OMT-C, [id number]

## 2020-02-17 ENCOUNTER — HOSPITAL ENCOUNTER (OUTPATIENT)
Dept: PHYSICAL THERAPY | Age: 65
Setting detail: THERAPIES SERIES
Discharge: HOME | End: 2020-02-17
Payer: COMMERCIAL

## 2020-02-17 PROCEDURE — 97140 MANUAL THERAPY 1/> REGIONS: CPT

## 2020-02-17 PROCEDURE — 97110 THERAPEUTIC EXERCISES: CPT

## 2020-02-17 NOTE — FLOWSHEET NOTE
Outpatient Physical Therapy     [x] Daily Treatment Note   [] Progress Note   [] Discharge Note    Date:  2/17/2020    Patient Name:  Rima Goddard  \"Fili\"  Wife \"Tarah\"        YOB: 1955    Medical Diagnosis:  Vestibular therapy per  (no diagnosis, dept checking with [address] [place] [address] for diagosis and code) /L clavicle ORIF with non-union                                                     ICD 10:  vestibular Not on C9  / S42.022K     Treatment Diagnosis:  Dizziness/vertigo, imbalance, abnormality of gait, L shoulder/arm pain, weakness, decreased ROM     Onset Date:    2/14/19                 Referral Date:  12/6/19 (date of C9 for vestibular)   / 12/16/19  (date for L clavicle)       Referring Physician:  Dr. Marco A Sanchez (neurosurgeon- [phone])  / Dr. Kim Carballo ( ortho [phone])                                                    Visits Allowed/Insurance/Certification Information:  [place] [id number] approved for treatment as needed for vestibular and L clavicle from 12/16/19 to   Extended until 2/24/20, 2-3x/week x 4-6 weeks     Restrictions/Precautions:  also had FX of C1 and C2 and  fx of occ condyle (last CT in August 2019 still healing)      fx ribs 3-10.     Had a collapsed lung and concussion. Tachycardia per wife since accident, on meds. HTN, DM, ORIF L clavicle 11/4/19, Initial Restrictions: no flx above 90, no lifting above 5#.   Per phone conversation with Dr. Hector Loya office on 1/13/20, activity as tolerated per MD note on 12/16/19     Plan of care sent to provider:   [x]Faxed to Dr. Laly Simmons  []Co-signature    (attempts: 1[x] 2 []3[])         Plan of care signed:      [x]Yes date: 1/14/20           []No      Progress Note covers period from (if applicable):    [x]NA    []From 2/5/20           Next Progress Note due:   On 2/24/20    Visit# / total visits:  12/12-18 from 12/16/19 to extended until 2/24/20    Plan for Next Session:    L clavicle: Shoulder ROM and strength, will review exs given and revise as needed  Vestibular: Once receive clearance either by MD or new CT scan, will test THE [facility] B. Initiate balance activities, eye exs as needed, gait, LE ex as needed. Subjective:  Pt states that he is feeling Ok at the moment. Saturday was \"a nightmare\". He woke up in the middle of the night in severe pain. His shoulder was raised in comparison to the Right, and it \"seemed swollen\". Points to region of upper traps. Wife believes it is still elevated. He also has neck pain and points to Left lateral cervical muscles. Does not identify any specific MADI, stating that he just woke up that way. Pt seeing Dr. Bull Smalls on Tuesday, 2/18/20 and hopes to get a final answer about the healing status of his neck. Pt returns to Dr. Pearl Arambula 3/30/20        Pain level: [place]ana [place] 4/10 ;  10/10 in neck and shoulder on Saturday    AT EVAL:   Patient describes pain to be constant, Sharp, dull, aching, squeezing, throbbing and pressure  Pain location:  Neck and HA, L clavicle and shoulder  Patient reports pain is:  6/10 pain at present      8/10 pain at its worst.    Objective:   1/13/20:   PT left message for Dr. Kathi Schmid office (spoke with Galileo Lane who states a nurse will call back)  1)  to request CT scan for cervical spine and to ask them to put in C9 for CT scan, 2) any precautions for doing PT on neck  3) is it ok to test THE [facility] (45 deg rotation and 20 deg extension of neck)  PT also [place] [facility] [place]9 for CT scan and doesn't care if it comes from Dr. Kathi Schmid office or she may ask Dr. Katherine Kirby (eye MD) for it. She will also get diagnosis code for the vestibular therapy and will also look into getting C9 to treat cervical spine . PT still has not heard back from Dr. Larisa Villagomez office      Exercises:    Exercises in bold performed in department today. Items not bolded are carried forward from prior visits for continuity of the record.   VR = extension and ankle PF with ex with no UE support, CGA      Stepping strategies anteriorly and posteriorly with perturbations from PT  10 reps in each direction   No LOB, pt able to perform stepping strategy with good form and no UE support, CGA                             []      Therapeutic Exercise/Home Exercise Program:   35 minutes  HEP established, See above, reviewed, pt given handout(s) of new ex. Pt inst to do shoulder stretches 2x/day and strength exs 1x/day. Discussed use of Green vs. Blue TB. Importance of monitoring form. Recommendation to increase reps vs resistance until easily able to perform sets of 20  PT reviewed MRIs with pt and wife, educated on spine anatomy and physiology  Discussed plan on what to discuss with Dr. Jaison Broussard to go over findings and ask for most appropriate imaging test to visualize upper cervical spine fractures to make sure they are healed (open mouth xray/odontoid view vs CT scan)   Reviewed suggestion to purchase hand weights for home. Recommend a 2#, a 3#, and a 5# weight. PROM: IR 70 @ 90 deg abd,  ER 65 @ 90 deg abd, flex 160, abd 115     Group Therapy:    0 minutes    Therapeutic Activity:  0 minutes     Gait: 0 minutes    Neuromuscular Re-Education: 0  minutes  see above  Pt inst to do eye exs 3x/day    Canalith Repositioning Procedure:  0 minutes     Manual Therapy:   20 minutes    Gr I-III GH dist, inf and post glides to pt tolerance  Gentle deep pressure release to UT, SCM, and subscapularis, with shoulder prom  Gentle PROM Shoulder flex/ext, IR/ER  AAROM flexion with resisted extension   Gentle cross hand MFR to Left thoracic region. Pt reports one brief episode of sharp anterior rib pain. Resolved with return to sitting.         Modalities: 0 minutes      Functional Outcome Measure:   []NA     Measure Used:  [facility]  Date Assessed: 1/16/20  Score: 50    Assessment/Treatment/Activity Tolerance:        Patients response to treatment:   [x]Patient tolerated

## 2020-02-19 ENCOUNTER — HOSPITAL ENCOUNTER (OUTPATIENT)
Dept: PHYSICAL THERAPY | Age: 65
Setting detail: THERAPIES SERIES
Discharge: HOME | End: 2020-02-19
Payer: COMMERCIAL

## 2020-02-19 PROCEDURE — 97110 THERAPEUTIC EXERCISES: CPT

## 2020-02-19 PROCEDURE — 97140 MANUAL THERAPY 1/> REGIONS: CPT

## 2020-02-19 NOTE — FLOWSHEET NOTE
Outpatient Physical Therapy     [x] Daily Treatment Note   [] Progress Note   [] Discharge Note    Date:  2/19/2020    Patient Name:  Monica Cueto  \"Fili\"  Wife \"Tarah\"        YOB: 1955    Medical Diagnosis:  Vestibular therapy per  (no diagnosis, dept checking with [address] [place] [address] for diagosis and code) /L clavicle ORIF with non-union                                                     ICD 10:  vestibular Not on C9  / S42.022K     Treatment Diagnosis:  Dizziness/vertigo, imbalance, abnormality of gait, L shoulder/arm pain, weakness, decreased ROM     Onset Date:    2/14/19                 Referral Date:  12/6/19 (date of C9 for vestibular)   / 12/16/19  (date for L clavicle)       Referring Physician:  Dr. Lacey Alcantar (neurosurgeon- [phone])  / Dr. Thanh Floyd ( ortho [phone])                                                    Visits Allowed/Insurance/Certification Information:  [place] [id number] approved for treatment as needed for vestibular and L clavicle from 12/16/19 to   Extended until 2/24/20, 2-3x/week x 4-6 weeks     Restrictions/Precautions:  also had FX of C1 and C2 and  fx of occ condyle (last CT in August 2019 still healing)      fx ribs 3-10.     Had a collapsed lung and concussion. Tachycardia per wife since accident, on meds. HTN, DM, ORIF L clavicle 11/4/19, Initial Restrictions: no flx above 90, no lifting above 5#. Per phone conversation with Dr. Nury Sabillon office on 1/13/20, activity as tolerated per MD note on 12/16/19   As per verbal conversation with patient and his wife, no vestibular therapy for now until new orders received from Dr Yennifer Juarez visit on 02/18/2020, recommended cervical fusion. Surgery date is not yet decided.     Plan of care sent to provider:   [x]Faxed to Dr. Jinny Lopez  []Co-signature    (attempts: 1[x] 2 []3[])         Plan of care signed:      [x]Yes date: 1/14/20           []No      Progress Note covers period from (if applicable):    [x]NA []From 2/5/20           Next Progress Note due:   On 2/24/20    Visit# / total visits:  13/12-18 from 12/16/19 to extended until 2/24/20    Plan for Next Session:    L clavicle: Shoulder ROM and strength, will review exs given and revise as needed  Vestibular: Once receive clearance either by MD or new CT scan, will test THE [facility] B. Initiate balance activities, eye exs as needed, gait, LE ex as needed. Subjective:  Pt states that he is feeling Ok at the moment. Patient had office visit on 2/18/2020 with Dr Cesar Mauricio as per which patient had been recommended with cervical fusion and no vestibular therapy currently. Pt returns to Dr. Jennifer Carvajal 3/30/20        Pain level: [place] 4/10 ;  6/10 in neck and shoulder. AT EVAL:   Patient describes pain to be constant, Sharp, dull, aching, squeezing, throbbing and pressure  Pain location:  Neck and HA, L clavicle and shoulder  Patient reports pain is:  6/10 pain at present      8/10 pain at its worst.    Objective:   1/13/20:   PT left message for Dr. Zachery Hall office (spoke with Ita Chirinos who states a nurse will call back)  1)  to request CT scan for cervical spine and to ask them to put in C9 for CT scan, 2) any precautions for doing PT on neck  3) is it ok to test THE [facility] (45 deg rotation and 20 deg extension of neck)  PT also spoke to [place]orgito[place] [facility] [place] who agrees pt needs C9 for CT scan and doesn't care if it comes from Dr. Zachery Hall office or she may ask Dr. Anne Magallanes (eye MD) for it. She will also get diagnosis code for the vestibular therapy and will also look into getting C9 to treat cervical spine . PT still has not heard back from Dr. Carito Christian office      Exercises:    Exercises in bold performed in department today. Items not bolded are carried forward from prior visits for continuity of the record.   VR = verbally reviewed  Exercise/Equipment Resistance/Repetitions HEP Other comments     nustep  Level 5, 10 minutes [] Seat 11, arms 12, subjective x 5 with perturbations from PT  10 reps in each direction   No LOB, pt able to perform stepping strategy with good form and no UE support, CGA                             []      Therapeutic Exercise/Home Exercise Program:   35 minutes  HEP established, See above, reviewed, pt given handout(s) of new ex. Pt inst to do shoulder stretches 2x/day and strength exs 1x/day. Discussed use of Green vs. Blue TB. Importance of monitoring form. Recommendation to increase reps vs resistance until easily able to perform sets of 20  PT reviewed MRIs with pt and wife, educated on spine anatomy and physiology  Discussed plan on what to discuss with Dr. Griselda Berber to go over findings and ask for most appropriate imaging test to visualize upper cervical spine fractures to make sure they are healed (open mouth xray/odontoid view vs CT scan)   Reviewed suggestion to purchase hand weights for home. Recommend a 2#, a 3#, and a 5# weight. PROM: IR 70 @ 90 deg abd,  ER 65 @ 90 deg abd, flex 160, abd 115     Group Therapy:    0 minutes    Therapeutic Activity:  0 minutes     Gait: 0 minutes    Neuromuscular Re-Education: 0  minutes  see above  Pt inst to do eye exs 3x/day    Canalith Repositioning Procedure:  0 minutes     Manual Therapy:   15 minutes    Gr I-III GH dist, inf and post glides to pt tolerance    Gentle PROM Shoulder flex/ext, IR/ER  AAROM flexion with resisted extension   Gentle scapular mobilization grade II-III on the L scapula for increased joint mobility.       Modalities: 0 minutes      Functional Outcome Measure:   []NA     Measure Used:  [facility]  Date Assessed: 1/16/20  Score: 50    Assessment/Treatment/Activity Tolerance:        Patients response to treatment:   [x]Patient tolerated treatment well []Patient limited by fatigue  []Patient limited by pain  []Patient limited by other medical complications   []Other:     Goals:   Progress towards goals:  Goals met as marked below per progress note on 2/5/20    GOALS

## 2020-02-24 ENCOUNTER — HOSPITAL ENCOUNTER (OUTPATIENT)
Dept: PHYSICAL THERAPY | Age: 65
Setting detail: THERAPIES SERIES
Discharge: HOME | End: 2020-02-24
Payer: COMMERCIAL

## 2020-02-24 PROCEDURE — 97110 THERAPEUTIC EXERCISES: CPT

## 2020-02-24 PROCEDURE — 97140 MANUAL THERAPY 1/> REGIONS: CPT

## 2020-02-24 NOTE — PROGRESS NOTES
Dr. Reji Esparza,    Pt has had 14 visits of PT under this current C9. Pt has made great progress with L clavicle and shoulder. Unfortunately, pt will be having cervical fusion surgery soon so PT will be discontinued at this time. However, PT feels pt will need to continue PT for L shoulder strengthening once he has healed from neck surgery and cleared by surgeon. So when pt is ready, PT recommends continued PT for L clavicle/shoulder and new C9 will be needed. Thank you for this referral,   Wu Thompson, PT, OMT-C, [id number]      Outpatient Physical Therapy     [x] Daily Treatment Note   [] Progress Note   [x] Discharge Note    Date:  2/24/2020    Patient Name:  Rima Goddard  \"Fili\"  Wife \"Tarah\"        YOB: 1955    Medical Diagnosis:  Vestibular therapy per C9 (no diagnosis, dept checking with [facility] for diagosis and code) /L clavicle ORIF with non-union                                                     ICD 10:  vestibular Not on C9  / S42.022K     Treatment Diagnosis:  Dizziness/vertigo, imbalance, abnormality of gait, L shoulder/arm pain, weakness, decreased ROM     Onset Date:    2/14/19                 Referral Date:  12/6/19 (date of C9 for vestibular)   / 12/16/19  (date for L clavicle)       Referring Physician:  Dr. Marco A Sanchez (neurosurgeon- [phone])  / Dr. Kim Carballo ( ortho [phone])                                                    Visits Allowed/Insurance/Certification Information:  [place] [id number] approved for treatment as needed for vestibular and L clavicle from 12/16/19 to   Extended until 2/24/20, 2-3x/week x 4-6 weeks     Restrictions/Precautions:  also had FX of C1 and C2 and  fx of occ condyle (last CT in August 2019 still healing)      fx ribs 3-10.     Had a collapsed lung and concussion. Tachycardia per wife since accident, on meds. HTN, DM, ORIF L clavicle 11/4/19, Initial Restrictions: no flx above 90, no lifting above 5#.   Per phone conversation with Dr. Stephens [facility] office on 1/13/20, activity as tolerated per MD note on 12/16/19   As per verbal conversation with patient and his wife, no vestibular therapy for now until new orders received from Dr Larisa Villagomez visit on 02/18/2020, recommended cervical fusion. Surgery date is not yet decided. Plan of care sent to provider:   [x]Faxed to Dr. Skyla Pedersen  []Co-signature    (attempts: 1[x] 2 []3[])         Plan of care signed:      [x]Yes date: 1/14/20           []No      Progress Note covers period from (if applicable):    []NA    [x]From 2/5/20 to 2/24/20           Next Progress Note due:   NA    Visit# / total visits:  14/12-18 from 12/16/19 to extended until 2/24/20    Plan for Next Session:  NA    Subjective:  Pt states he had office visit on 2/18/2020 with Dr Skyla Pedersen as per which patient had been recommended with cervical fusion within 30 days time. Pt is to discontinue vestibular therapy. Wife states [facility] has approved the surgery. Pt returns to Dr. Pearl Arambula 3/30/20 for the clavicle. Pain level:  Neck pain currently 4-5/10, L clavicle/shoulder pain 1-2/10. AT EVAL:   Patient describes pain to be constant, Sharp, dull, aching, squeezing, throbbing and pressure  Pain location:  Neck and HA, L clavicle and shoulder  Patient reports pain is:  6/10 pain at present      8/10 pain at its worst.    Objective:     Exercises:    Exercises in bold performed in department today. Items not bolded are carried forward from prior visits for continuity of the record.   VR = verbally reviewed  Exercise/Equipment Resistance/Repetitions HEP Other comments     nustep  Level 5, 10 minutes [] Seat 11, arms 12, subjective x 5 min during     Scifit  Level 2, 15 minutes  Pt educated on how to start machine and adjust level   Seat 7, with inner arms,       Shoulder exs:         pendulums Verbally reviewed (VR) 20x CW and CCW [x]      scap retraction standing with arms at sides VR, Hold 10 sec, 2-3x10 [x]      Supine cane press up Gaze stability Ex:     DISCONTINUE ALL EYE EXS      VOR x 1 viewing, progressing to standing this week, far target, with wife present for safety VR, Pt inst to cont this exercise x 1 minute with horizontal head turns and x 1 minute with vertical head nods. Pt to only go as fast as he/she can while keeping target clear and in focus. Pt inst to do this ex 3x/day. [x] Pt inst to do within limits of neck ROM and to not make neck hurt. VOR x 1 viewing, progressing to standing this week, close target, with wife present for safety. VR, Pt inst to cont this exercise x 1 minute with horizontal head turns and x 1 minute with vertical head nods. Pt to only go as fast as he/she can while keeping target clear and in focus. Pt inst to do this ex 3x/day. [x]   \"  \"  \"     Gait exercise  VR, Pt inst to take a walk for 10-20 minutes 1x daily, preferable outdoors. [x]      Saccade exercise seated, x and z targets close together (6 inches apart)  VR, Pt inst to do this exercise x 1 minute in horizontal direction and  x 1 minute in vertical direction. Pt to only go as fast as he/she can while keeping target clear and in focus. Pt inst to do this ex 3x/day. [x] Pt inst to try to move eyes from one target to other with just one jump of the eyes. Pre-Ankle strategy /weight shifts ant/post 20-30 reps   [] CGA      Ankle strategies anteriorly and posteriorly with perturbations from PT 10 reps in each direction   No LOB, pt did well, able to initiate toe extension and ankle PF with ex with no UE support, CGA      Stepping strategies anteriorly and posteriorly with perturbations from PT  10 reps in each direction   No LOB, pt able to perform stepping strategy with good form and no UE support, CGA                             []      Therapeutic Exercise/Home Exercise Program:   57 minutes  HEP has been established, See above, reviewed, pt has handout(s) of all ex.    Pt inst he can do supine shoulder exs to treatment:   [x]Patient tolerated treatment well []Patient limited by fatigue  []Patient limited by pain  []Patient limited by other medical complications   []Other:     Goals:   Progress towards goals:  Goals met as marked below     GOALS   vestibular    Short Term Goals:   3  weeks Long Term Goals:  6  weeks   1). Establish HEP MET 1). Pt indep with HEP NOT MET   2). Assess DHI and make LTG  MET 2). Decrease subjective complaints of dizziness and vertigo by 75% or better NOT MET    3). Assess balance with DGI or Tinetti and make LTG MET 3). improve Tinetti score to 24/28 or better NOT MET    4). Test for BPPV and treat if needed PARTIALLY MET, NEEDS TO BE ASSESSED AGAIN ONCE MRI CLEARS CERVICAL SPINE 4). DHI score improved to 15 or less NOT MET   5). 5). Gait without AD for most distances with no significant deviations. NOT MET      Goals: L clavicle     Short Term Goals ( 3-4   weeks) Long Term Goals (  8 weeks)   1).    Establish HEP MET 1). (I) HEP for shoulder MET   2). AROM to   80 flx  MET   80 abd  MET  ER to upper trap MET 2). AROM  120 flx   110 abd     ER to C5 NOT MET   3). MMT to  3+ to 4- MET  3). MMT 4 to 4+ PARTIALLY MET   4. Improve use 20%  MET                           4.improve use 50% MET             12 week goals NOT MET     1. mmt to 4+ to 5/5     2. AROM to 140 flx  130 abd  ER to T2     3. Improve use 75%    . Lynn Mosqueda        Prognosis: [x]Good   []Fair   []Poor    Patient Requires Follow-up:  [x]Yes  []No    Plan: []Plan of care initiated     []Continue per plan of care     [] Alter current plan (see comments)    []Hold pending MD visit [x]Discharge from PT at this time due to pt will be having cervical fusion soon    Timed Code Treatment Minutes:  67    Total Treatment Minutes:  67     Medicare Cap total YTD:   [x]N/A    Workers Comp Time Stamp    Time In:  1100   Time Out: 1207     Electronically signed by:  Cathy Craig, OMT-C, [id number]

## 2020-02-25 LAB
AVERAGE GLUCOSE: NORMAL
HBA1C MFR BLD: 9.4 %

## 2020-02-26 ENCOUNTER — APPOINTMENT (OUTPATIENT)
Dept: PHYSICAL THERAPY | Age: 65
End: 2020-02-26
Payer: COMMERCIAL

## 2020-04-27 ENCOUNTER — TELEMEDICINE (OUTPATIENT)
Dept: INTERNAL MEDICINE CLINIC | Age: 65
End: 2020-04-27
Payer: COMMERCIAL

## 2020-04-27 PROCEDURE — 99213 OFFICE O/P EST LOW 20 MIN: CPT | Performed by: INTERNAL MEDICINE

## 2020-04-27 RX ORDER — SULFAMETHOXAZOLE AND TRIMETHOPRIM 800; 160 MG/1; MG/1
1 TABLET ORAL 2 TIMES DAILY
Qty: 14 TABLET | Refills: 0 | Status: SHIPPED | OUTPATIENT
Start: 2020-04-27 | End: 2020-05-04

## 2020-04-27 ASSESSMENT — PATIENT HEALTH QUESTIONNAIRE - PHQ9
SUM OF ALL RESPONSES TO PHQ9 QUESTIONS 1 & 2: 0
2. FEELING DOWN, DEPRESSED OR HOPELESS: 0
SUM OF ALL RESPONSES TO PHQ QUESTIONS 1-9: 0
SUM OF ALL RESPONSES TO PHQ QUESTIONS 1-9: 0
1. LITTLE INTEREST OR PLEASURE IN DOING THINGS: 0

## 2020-04-27 NOTE — PROGRESS NOTES
Provider, MD yapFENesin-codeine (Lyndsey Alba) 100-10 MG/5ML syrup Take 5 mLs by mouth 3 times daily as needed for Cough  Angeline Arriaga MD       Social History     Tobacco Use    Smoking status: Former Smoker     Packs/day: 1.50     Years: 15.00     Pack years: 22.50     Last attempt to quit: 1991     Years since quittin.0    Smokeless tobacco: Never Used   Substance Use Topics    Alcohol use: No    Drug use: No        No Known Allergies,   Past Medical History:   Diagnosis Date    Diabetes mellitus ([facility] [address]     Hyperlipidemia     Hypertension        PHYSICAL EXAMINATION:  [ INSTRUCTIONS:  \"[x]\" Indicates a positive item  \"[]\" Indicates a negative item  -- DELETE ALL ITEMS NOT EXAMINED]  Vital Signs: (As obtained by patient/caregiver or practitioner observation)        Constitutional: [x] Appears well-developed and well-nourished [x] No apparent distress      [] Abnormal-   Mental status  [x] Alert and awake  [x] Oriented to person/place/time [x]Able to follow commands      Eyes:  EOM    [x]  Normal  [] Abnormal-  Sclera  []  Normal  [] Abnormal -         Discharge []  None visible  [] Abnormal -    HENT:   [x] Normocephalic, atraumatic. [] Abnormal   [x] Mouth/Throat: Mucous membranes are moist.     External Ears [x] Normal  [] Abnormal-     Neck: [x] No visualized mass     Pulmonary/Chest: [x] Respiratory effort normal.  [x] No visualized signs of difficulty breathing or respiratory distress        [] Abnormal-      Musculoskeletal:            [] Normal range of motion of neck        [x] Abnormal-  Left knee with small abrasion on patella. +surrounding erythema and swelling.        Neurological:        [x] No Facial Asymmetry (Cranial nerve 7 motor function) (limited exam to video visit)          [x] No gaze palsy        [] Abnormal-                Psychiatric:       [x] Normal Affect [x] No Hallucinations        [] Abnormal-     Other pertinent observable physical exam findings- 11:14 AM    An electronic signature was used to authenticate this note.

## 2020-04-29 ENCOUNTER — TELEPHONE (OUTPATIENT)
Dept: INTERNAL MEDICINE CLINIC | Age: 65
End: 2020-04-29

## 2020-04-29 NOTE — TELEPHONE ENCOUNTER
Patient has pending ACDF C5-6 with Dr. Kandice Schuster. See scanned labs . They are requesting patient be re evaluated for pre-op. Would you like us to put him on for this Friday when you are here?

## 2020-05-01 ENCOUNTER — OFFICE VISIT (OUTPATIENT)
Dept: INTERNAL MEDICINE CLINIC | Age: 65
End: 2020-05-01
Payer: COMMERCIAL

## 2020-05-01 VITALS
DIASTOLIC BLOOD PRESSURE: 60 MMHG | SYSTOLIC BLOOD PRESSURE: 118 MMHG | BODY MASS INDEX: 26.71 KG/M2 | TEMPERATURE: 97.4 F | OXYGEN SATURATION: 98 % | WEIGHT: 208 LBS | HEART RATE: 108 BPM

## 2020-05-01 PROCEDURE — 99213 OFFICE O/P EST LOW 20 MIN: CPT | Performed by: NURSE PRACTITIONER

## 2020-05-01 RX ORDER — CEPHALEXIN 500 MG/1
500 CAPSULE ORAL 4 TIMES DAILY
Qty: 28 CAPSULE | Refills: 0 | Status: ON HOLD | OUTPATIENT
Start: 2020-05-01 | End: 2020-05-10 | Stop reason: HOSPADM

## 2020-05-01 ASSESSMENT — PATIENT HEALTH QUESTIONNAIRE - PHQ9
SUM OF ALL RESPONSES TO PHQ QUESTIONS 1-9: 0
2. FEELING DOWN, DEPRESSED OR HOPELESS: 0
SUM OF ALL RESPONSES TO PHQ QUESTIONS 1-9: 0
SUM OF ALL RESPONSES TO PHQ9 QUESTIONS 1 & 2: 0
1. LITTLE INTEREST OR PLEASURE IN DOING THINGS: 0

## 2020-05-01 ASSESSMENT — ENCOUNTER SYMPTOMS
CONSTIPATION: 0
ABDOMINAL PAIN: 0
NAUSEA: 0
ALLERGIC/IMMUNOLOGIC NEGATIVE: 1
SHORTNESS OF BREATH: 0
VOMITING: 0
COUGH: 0
COLOR CHANGE: 1
CHEST TIGHTNESS: 0
DIARRHEA: 0

## 2020-05-01 NOTE — PROGRESS NOTES
session: Not on file    Stress: Not on file   Relationships    Social connections     Talks on phone: Not on file     Gets together: Not on file     Attends Confucianism service: Not on file     Active member of club or organization: Not on file     Attends meetings of clubs or organizations: Not on file     Relationship status: Not on file    Intimate partner violence     Fear of current or ex partner: Not on file     Emotionally abused: Not on file     Physically abused: Not on file     Forced sexual activity: Not on file   Other Topics Concern    Not on file   Social History Narrative    Not on file       Review of Systems   Constitutional: Negative for chills and fever. Respiratory: Negative for cough, chest tightness and shortness of breath. Cardiovascular: Negative for chest pain and palpitations. Gastrointestinal: Negative for abdominal pain, constipation, diarrhea, nausea and vomiting. Endocrine: Negative. Genitourinary: Negative. Musculoskeletal: Positive for joint swelling (left knee). Skin: Positive for color change and rash. Allergic/Immunologic: Negative. Neurological: Negative for dizziness, syncope and light-headedness. Hematological: Negative. Psychiatric/Behavioral: Negative. OBJECTIVE:  /60 (Site: Right Upper Arm, Position: Sitting)   Pulse 108   Temp 97.4 °F (36.3 °C) (Oral)   Wt 208 lb (94.3 kg)   SpO2 98% Comment: RA at rest  BMI 26.71 kg/m²     Physical Exam  Vitals signs reviewed. Constitutional:       Appearance: Normal appearance. He is well-developed. HENT:      Head: Normocephalic and atraumatic. Eyes:      General: No scleral icterus. Conjunctiva/sclera: Conjunctivae normal.      Pupils: Pupils are equal, round, and reactive to light. Neck:      Musculoskeletal: Normal range of motion and neck supple. Cardiovascular:      Rate and Rhythm: Normal rate and regular rhythm. Heart sounds: Normal heart sounds.    Pulmonary:

## 2020-05-04 ENCOUNTER — TELEPHONE (OUTPATIENT)
Dept: INTERNAL MEDICINE CLINIC | Age: 65
End: 2020-05-04

## 2020-05-04 NOTE — TELEPHONE ENCOUNTER
Did we ever get any word if they could see him today or if they are having after hours clinic for emergencies? Thanks. Mila Friedman

## 2020-05-04 NOTE — TELEPHONE ENCOUNTER
Called and scheduled patient to see Dr. Mary Altamirano 5/6/20 9 AM. They know to arrive 15 minutes earlier use scarf or mask prior to entering building and to enter building alone. Denies fever, no travel, no contact with COVID. Called wife to let her know the time and place. They to call if worsening of symptoms/pain.  Carine Easton

## 2020-05-06 ENCOUNTER — OFFICE VISIT (OUTPATIENT)
Dept: ORTHOPEDIC SURGERY | Age: 65
End: 2020-05-06
Payer: COMMERCIAL

## 2020-05-06 ENCOUNTER — TELEPHONE (OUTPATIENT)
Dept: ORTHOPEDIC SURGERY | Age: 65
End: 2020-05-06

## 2020-05-06 ENCOUNTER — HOSPITAL ENCOUNTER (INPATIENT)
Age: 65
LOS: 4 days | Discharge: HOME | DRG: 558 | End: 2020-05-10
Attending: EMERGENCY MEDICINE | Admitting: INTERNAL MEDICINE
Payer: COMMERCIAL

## 2020-05-06 ENCOUNTER — APPOINTMENT (OUTPATIENT)
Dept: GENERAL RADIOLOGY | Age: 65
DRG: 558 | End: 2020-05-06
Payer: COMMERCIAL

## 2020-05-06 VITALS — BODY MASS INDEX: 26.68 KG/M2 | WEIGHT: 207.89 LBS | HEIGHT: 74 IN

## 2020-05-06 PROBLEM — L02.419 PREPATELLAR ABSCESS: Status: ACTIVE | Noted: 2020-05-06

## 2020-05-06 LAB
A/G RATIO: 1.3 (ref 1.1–2.2)
ALBUMIN SERPL-MCNC: 4.1 G/DL (ref 3.4–5)
ALP BLD-CCNC: 95 U/L (ref 40–129)
ALT SERPL-CCNC: 18 U/L (ref 10–40)
ANION GAP SERPL CALCULATED.3IONS-SCNC: 17 MMOL/L (ref 3–16)
AST SERPL-CCNC: 15 U/L (ref 15–37)
BILIRUB SERPL-MCNC: 0.8 MG/DL (ref 0–1)
BUN BLDV-MCNC: 19 MG/DL (ref 7–20)
CALCIUM SERPL-MCNC: 9.3 MG/DL (ref 8.3–10.6)
CHLORIDE BLD-SCNC: 93 MMOL/L (ref 99–110)
CO2: 20 MMOL/L (ref 21–32)
CREAT SERPL-MCNC: 0.9 MG/DL (ref 0.8–1.3)
GFR AFRICAN AMERICAN: >60
GFR NON-AFRICAN AMERICAN: >60
GLOBULIN: 3.1 G/DL
GLUCOSE BLD-MCNC: 302 MG/DL (ref 70–99)
HCT VFR BLD CALC: 41.9 % (ref 40.5–52.5)
HEMOGLOBIN: 13.9 G/DL (ref 13.5–17.5)
LACTIC ACID: 1 MMOL/L (ref 0.4–2)
MCH RBC QN AUTO: 29.5 PG (ref 26–34)
MCHC RBC AUTO-ENTMCNC: 33.1 G/DL (ref 31–36)
MCV RBC AUTO: 88.9 FL (ref 80–100)
PDW BLD-RTO: 12.8 % (ref 12.4–15.4)
PLATELET # BLD: 337 K/UL (ref 135–450)
PMV BLD AUTO: 7.4 FL (ref 5–10.5)
POTASSIUM SERPL-SCNC: 5.3 MMOL/L (ref 3.5–5.1)
PROCALCITONIN: 0.25 NG/ML (ref 0–0.15)
RBC # BLD: 4.71 M/UL (ref 4.2–5.9)
SEDIMENTATION RATE, ERYTHROCYTE: 55 MM/HR (ref 0–20)
SODIUM BLD-SCNC: 130 MMOL/L (ref 136–145)
TOTAL PROTEIN: 7.2 G/DL (ref 6.4–8.2)
WBC # BLD: 10.8 K/UL (ref 4–11)

## 2020-05-06 PROCEDURE — 96365 THER/PROPH/DIAG IV INF INIT: CPT

## 2020-05-06 PROCEDURE — 36415 COLL VENOUS BLD VENIPUNCTURE: CPT

## 2020-05-06 PROCEDURE — 93005 ELECTROCARDIOGRAM TRACING: CPT | Performed by: EMERGENCY MEDICINE

## 2020-05-06 PROCEDURE — L1832 KO ADJ JNT POS R SUP PRE CST: HCPCS | Performed by: ORTHOPAEDIC SURGERY

## 2020-05-06 PROCEDURE — 99244 OFF/OP CNSLTJ NEW/EST MOD 40: CPT | Performed by: ORTHOPAEDIC SURGERY

## 2020-05-06 PROCEDURE — 85652 RBC SED RATE AUTOMATED: CPT

## 2020-05-06 PROCEDURE — 84145 PROCALCITONIN (PCT): CPT

## 2020-05-06 PROCEDURE — 1200000000 HC SEMI PRIVATE

## 2020-05-06 PROCEDURE — 2580000003 HC RX 258: Performed by: EMERGENCY MEDICINE

## 2020-05-06 PROCEDURE — 87040 BLOOD CULTURE FOR BACTERIA: CPT

## 2020-05-06 PROCEDURE — 85027 COMPLETE CBC AUTOMATED: CPT

## 2020-05-06 PROCEDURE — 80053 COMPREHEN METABOLIC PANEL: CPT

## 2020-05-06 PROCEDURE — 96368 THER/DIAG CONCURRENT INF: CPT

## 2020-05-06 PROCEDURE — 6360000002 HC RX W HCPCS: Performed by: EMERGENCY MEDICINE

## 2020-05-06 PROCEDURE — 83605 ASSAY OF LACTIC ACID: CPT

## 2020-05-06 PROCEDURE — 73560 X-RAY EXAM OF KNEE 1 OR 2: CPT

## 2020-05-06 PROCEDURE — 99285 EMERGENCY DEPT VISIT HI MDM: CPT

## 2020-05-06 RX ORDER — 0.9 % SODIUM CHLORIDE 0.9 %
1000 INTRAVENOUS SOLUTION INTRAVENOUS ONCE
Status: COMPLETED | OUTPATIENT
Start: 2020-05-07 | End: 2020-05-07

## 2020-05-06 RX ORDER — SODIUM CHLORIDE, SODIUM LACTATE, POTASSIUM CHLORIDE, AND CALCIUM CHLORIDE .6; .31; .03; .02 G/100ML; G/100ML; G/100ML; G/100ML
1000 INJECTION, SOLUTION INTRAVENOUS ONCE
Status: COMPLETED | OUTPATIENT
Start: 2020-05-06 | End: 2020-05-06

## 2020-05-06 RX ORDER — SULFAMETHOXAZOLE AND TRIMETHOPRIM 800; 160 MG/1; MG/1
1 TABLET ORAL 2 TIMES DAILY
Qty: 20 TABLET | Refills: 0 | Status: ON HOLD | OUTPATIENT
Start: 2020-05-06 | End: 2020-05-10 | Stop reason: HOSPADM

## 2020-05-06 RX ADMIN — CEFTRIAXONE SODIUM 1 G: 1 INJECTION, POWDER, FOR SOLUTION INTRAMUSCULAR; INTRAVENOUS at 22:33

## 2020-05-06 RX ADMIN — SODIUM CHLORIDE, POTASSIUM CHLORIDE, SODIUM LACTATE AND CALCIUM CHLORIDE 1000 ML: 600; 310; 30; 20 INJECTION, SOLUTION INTRAVENOUS at 22:33

## 2020-05-06 RX ADMIN — VANCOMYCIN HYDROCHLORIDE 1250 MG: 10 INJECTION, POWDER, LYOPHILIZED, FOR SOLUTION INTRAVENOUS at 22:37

## 2020-05-06 RX ADMIN — SODIUM CHLORIDE, POTASSIUM CHLORIDE, SODIUM LACTATE AND CALCIUM CHLORIDE 1000 ML: 600; 310; 30; 20 INJECTION, SOLUTION INTRAVENOUS at 22:37

## 2020-05-06 ASSESSMENT — PAIN DESCRIPTION - LOCATION: LOCATION: KNEE

## 2020-05-06 ASSESSMENT — PAIN DESCRIPTION - PAIN TYPE: TYPE: ACUTE PAIN

## 2020-05-06 ASSESSMENT — PAIN SCALES - GENERAL: PAINLEVEL_OUTOF10: 5

## 2020-05-06 NOTE — PROGRESS NOTES
observation. All past and current ROS forms have been scanned into the medical record. He has been instructed to contact his primary care provider regarding ROS issues if not already being addressed at this time. There are no recent changes. The most recent ROS was scanned into media on 5/6/2020    Past Medical History:  (see most recent intake form scanned into media on above date)  Past Medical History:   Diagnosis Date    Diabetes mellitus ([address]     Hyperlipidemia     Hypertension        Past Surgical History:  (see most recent intake form scanned into media on above date)  Past Surgical History:   Procedure Laterality Date    SHOULDER ARTHROSCOPY      right       Allergies:  (see most recent intake form scanned into media on above date)  No Known Allergies    Medications:  (see most recent intake form scanned into media on above date)  Current Outpatient Medications   Medication Sig Dispense Refill    cephALEXin (KEFLEX) 500 MG capsule Take 1 capsule by mouth 4 times daily for 7 days 28 capsule 0    Empagliflozin (JARDIANCE PO) empagliflozin      metoprolol tartrate (LOPRESSOR) 25 MG tablet Take 1 tablet by mouth 2 times daily 180 tablet 3    ondansetron (ZOFRAN) 4 MG tablet Take 1 tablet by mouth 3 times daily as needed for Nausea or Vomiting 30 tablet 0    atorvastatin (LIPITOR) 20 MG tablet 1 daily      glipiZIDE (GLUCOTROL) 5 MG tablet 5 mg      lisinopril (PRINIVIL;ZESTRIL) 20 MG tablet TAKE 2 TABLETS BY MOUTH DAILY 180 tablet 0    metFORMIN (GLUCOPHAGE) 1000 MG tablet Take 1 tablet by mouth 2 times daily (with meals). Needs office visit with Dr. Aashish Evans prior to additional refills 180 tablet 0    Exenatide (BYDUREON SC) Inject  into the skin.  Omega-3 Fatty Acids (FISH OIL) 1000 MG CAPS Take 3,000 mg by mouth 3 times daily.  aspirin 81 MG EC tablet Take 81 mg by mouth daily.  CINNAMON PO Take 1,000 mg by mouth 2 times daily.         Multiple Vitamin (MULTI-VITAMIN PO) of the right lower extremity does not show any tenderness, deformity or injury. Range of motion is unremarkable. There is no gross instability. There are no rashes, ulcerations or lesions. Strength and tone are normal.      DIAGNOSTICS  Xrays obtained in office today: Yes  Xrays reviewed today: Yes  Four views of the left knee   Fracture: No  Dislocation: No  Knee joint arthritis: mild  Medial compartment: none  Lateral compartment: none  Patellofemoral compartment: none  Patellar tilt: No  Varus deformity: No  Valgus deformity:No         ASSESSMENT (Medical Decision Making)    Lynn Del Cid is a 60 y.o. male with the following diagnosis: Left knee prepatellar bursa septic bursitis secondary to an open wound created by a chainsaw. Today with significant erythema and swelling although by report his area of erythema is reduced. ICD-10-CM    1. Left knee pain, unspecified chronicity M25.562 XR KNEE LEFT (MIN 4 VIEWS)           PLAN (Medical Decision Making)  Office Procedures:  Orders Placed This Encounter   Procedures    XR KNEE LEFT (MIN 4 VIEWS)     Standing Status:   Future     Number of Occurrences:   1     Standing Expiration Date:   5/6/2021       Treatment Plan:    I discussed the diagnosis and treatment options with Bipin Caballero today. I had a long discussion with him today about his knee as well as his current antibiotic and his diabetes. I spent at least 60 minutes face to face with this patient today. Greater than 50% of that time was spent counseling, coordinating care, discussing and answering questions regarding the risks, benefits, and complications of left knee pain in detail. We discussed precautionary measures to prevent further injury, additional treatment options, we are going to attempt to treat him nonoperatively. I am concerned that if we opened this prepatellar bursa that he may have difficulty healing the wound.   .   Patient was asked to follow-up in 2 days and we can re-evaluate him again at that time. Healthy Lifestyle Measures: Patient education measures were discussed and materials distributed where indicated. Posture education and proper lifting and carrying techniques. Weight management was discussed when indicated. Non-steroidal anti-inflammatories medications (NSAIDs) can be used to assist with pain control and to reduce inflammatory changes. These medications may be over-the-counter or prescribed. We discussed taking the NSAID properly and the precautions. The patient understands that this medication may potentially interfere with other medications. Patient was also instructed to immediately discontinue the medication is there is any possible complication. Rubi Qiu was instructed to call the office if his symptoms worsen or if new symptoms appear prior to the next scheduled visit. He is specifically instructed to contact the office between now and schedule appointment if he has concerns related to his condition or if he needs assistance in scheduling any above tests. He is welcome to call for an appointment sooner if he has any additional concerns or questions. This dictation was performed with a verbal recognition program (DRAGON) and it was checked for errors. It is possible that there are still dictated errors within this office note. If so, please bring any errors to my attention for an addendum. All efforts were made to ensure that this office note is accurate.

## 2020-05-06 NOTE — TELEPHONE ENCOUNTER
[place]  [facility] [place]CERT REQUIRED -   PER  ASHLEY WILLIS @ [facility]   REF #: [id number]  Carolyn Vaz

## 2020-05-07 LAB
ANION GAP SERPL CALCULATED.3IONS-SCNC: 11 MMOL/L (ref 3–16)
BUN BLDV-MCNC: 14 MG/DL (ref 7–20)
CALCIUM SERPL-MCNC: 8.5 MG/DL (ref 8.3–10.6)
CHLORIDE BLD-SCNC: 101 MMOL/L (ref 99–110)
CO2: 23 MMOL/L (ref 21–32)
CREAT SERPL-MCNC: 0.7 MG/DL (ref 0.8–1.3)
EKG ATRIAL RATE: 143 BPM
EKG DIAGNOSIS: NORMAL
EKG P AXIS: 41 DEGREES
EKG P-R INTERVAL: 144 MS
EKG Q-T INTERVAL: 272 MS
EKG QRS DURATION: 90 MS
EKG QTC CALCULATION (BAZETT): 419 MS
EKG R AXIS: 71 DEGREES
EKG T AXIS: 41 DEGREES
EKG VENTRICULAR RATE: 143 BPM
GFR AFRICAN AMERICAN: >60
GFR NON-AFRICAN AMERICAN: >60
GLUCOSE BLD-MCNC: 145 MG/DL (ref 70–99)
GLUCOSE BLD-MCNC: 152 MG/DL (ref 70–99)
GLUCOSE BLD-MCNC: 159 MG/DL (ref 70–99)
GLUCOSE BLD-MCNC: 191 MG/DL (ref 70–99)
GLUCOSE BLD-MCNC: 193 MG/DL (ref 70–99)
GLUCOSE BLD-MCNC: 237 MG/DL (ref 70–99)
HCT VFR BLD CALC: 36.4 % (ref 40.5–52.5)
HEMOGLOBIN: 12.4 G/DL (ref 13.5–17.5)
MCH RBC QN AUTO: 29.7 PG (ref 26–34)
MCHC RBC AUTO-ENTMCNC: 33.9 G/DL (ref 31–36)
MCV RBC AUTO: 87.6 FL (ref 80–100)
PDW BLD-RTO: 12.7 % (ref 12.4–15.4)
PERFORMED ON: ABNORMAL
PLATELET # BLD: 333 K/UL (ref 135–450)
PMV BLD AUTO: 7.6 FL (ref 5–10.5)
POTASSIUM REFLEX MAGNESIUM: 4.4 MMOL/L (ref 3.5–5.1)
RBC # BLD: 4.16 M/UL (ref 4.2–5.9)
SODIUM BLD-SCNC: 135 MMOL/L (ref 136–145)
WBC # BLD: 6.5 K/UL (ref 4–11)

## 2020-05-07 PROCEDURE — 85027 COMPLETE CBC AUTOMATED: CPT

## 2020-05-07 PROCEDURE — 6370000000 HC RX 637 (ALT 250 FOR IP): Performed by: INTERNAL MEDICINE

## 2020-05-07 PROCEDURE — 87641 MR-STAPH DNA AMP PROBE: CPT

## 2020-05-07 PROCEDURE — 83036 HEMOGLOBIN GLYCOSYLATED A1C: CPT

## 2020-05-07 PROCEDURE — 2580000003 HC RX 258

## 2020-05-07 PROCEDURE — 93010 ELECTROCARDIOGRAM REPORT: CPT | Performed by: INTERNAL MEDICINE

## 2020-05-07 PROCEDURE — 2580000003 HC RX 258: Performed by: INTERNAL MEDICINE

## 2020-05-07 PROCEDURE — 6360000002 HC RX W HCPCS: Performed by: INTERNAL MEDICINE

## 2020-05-07 PROCEDURE — 2580000003 HC RX 258: Performed by: EMERGENCY MEDICINE

## 2020-05-07 PROCEDURE — 36415 COLL VENOUS BLD VENIPUNCTURE: CPT

## 2020-05-07 PROCEDURE — 80048 BASIC METABOLIC PNL TOTAL CA: CPT

## 2020-05-07 PROCEDURE — 1200000000 HC SEMI PRIVATE

## 2020-05-07 RX ORDER — ACETAMINOPHEN 325 MG/1
650 TABLET ORAL EVERY 6 HOURS PRN
Status: DISCONTINUED | OUTPATIENT
Start: 2020-05-07 | End: 2020-05-10 | Stop reason: HOSPADM

## 2020-05-07 RX ORDER — PROMETHAZINE HYDROCHLORIDE 25 MG/1
12.5 TABLET ORAL EVERY 6 HOURS PRN
Status: DISCONTINUED | OUTPATIENT
Start: 2020-05-07 | End: 2020-05-10 | Stop reason: HOSPADM

## 2020-05-07 RX ORDER — ASPIRIN 81 MG/1
81 TABLET ORAL DAILY
Status: DISCONTINUED | OUTPATIENT
Start: 2020-05-07 | End: 2020-05-10 | Stop reason: HOSPADM

## 2020-05-07 RX ORDER — SODIUM CHLORIDE 0.9 % (FLUSH) 0.9 %
10 SYRINGE (ML) INJECTION PRN
Status: DISCONTINUED | OUTPATIENT
Start: 2020-05-07 | End: 2020-05-10 | Stop reason: HOSPADM

## 2020-05-07 RX ORDER — LISINOPRIL 20 MG/1
20 TABLET ORAL DAILY
Status: DISCONTINUED | OUTPATIENT
Start: 2020-05-07 | End: 2020-05-10 | Stop reason: HOSPADM

## 2020-05-07 RX ORDER — ATORVASTATIN CALCIUM 10 MG/1
20 TABLET, FILM COATED ORAL NIGHTLY
Status: DISCONTINUED | OUTPATIENT
Start: 2020-05-07 | End: 2020-05-10 | Stop reason: HOSPADM

## 2020-05-07 RX ORDER — SODIUM CHLORIDE 0.9 % (FLUSH) 0.9 %
10 SYRINGE (ML) INJECTION EVERY 12 HOURS SCHEDULED
Status: DISCONTINUED | OUTPATIENT
Start: 2020-05-07 | End: 2020-05-10 | Stop reason: HOSPADM

## 2020-05-07 RX ORDER — POLYETHYLENE GLYCOL 3350 17 G/17G
17 POWDER, FOR SOLUTION ORAL DAILY PRN
Status: DISCONTINUED | OUTPATIENT
Start: 2020-05-07 | End: 2020-05-10 | Stop reason: HOSPADM

## 2020-05-07 RX ORDER — ONDANSETRON 2 MG/ML
4 INJECTION INTRAMUSCULAR; INTRAVENOUS EVERY 6 HOURS PRN
Status: DISCONTINUED | OUTPATIENT
Start: 2020-05-07 | End: 2020-05-10 | Stop reason: HOSPADM

## 2020-05-07 RX ORDER — ACETAMINOPHEN 650 MG/1
650 SUPPOSITORY RECTAL EVERY 6 HOURS PRN
Status: DISCONTINUED | OUTPATIENT
Start: 2020-05-07 | End: 2020-05-10 | Stop reason: HOSPADM

## 2020-05-07 RX ORDER — SODIUM CHLORIDE 9 MG/ML
INJECTION, SOLUTION INTRAVENOUS
Status: COMPLETED
Start: 2020-05-07 | End: 2020-05-07

## 2020-05-07 RX ADMIN — LISINOPRIL 20 MG: 20 TABLET ORAL at 09:05

## 2020-05-07 RX ADMIN — INSULIN LISPRO 2 UNITS: 100 INJECTION, SOLUTION INTRAVENOUS; SUBCUTANEOUS at 11:59

## 2020-05-07 RX ADMIN — ACETAMINOPHEN 650 MG: 325 TABLET ORAL at 21:50

## 2020-05-07 RX ADMIN — ENOXAPARIN SODIUM 40 MG: 40 INJECTION SUBCUTANEOUS at 09:05

## 2020-05-07 RX ADMIN — METOPROLOL TARTRATE 25 MG: 25 TABLET, FILM COATED ORAL at 21:50

## 2020-05-07 RX ADMIN — INSULIN LISPRO 1 UNITS: 100 INJECTION, SOLUTION INTRAVENOUS; SUBCUTANEOUS at 21:46

## 2020-05-07 RX ADMIN — SODIUM CHLORIDE: 900 INJECTION, SOLUTION INTRAVENOUS at 21:56

## 2020-05-07 RX ADMIN — INSULIN LISPRO 2 UNITS: 100 INJECTION, SOLUTION INTRAVENOUS; SUBCUTANEOUS at 00:57

## 2020-05-07 RX ADMIN — SODIUM CHLORIDE 1000 ML: 9 INJECTION, SOLUTION INTRAVENOUS at 00:10

## 2020-05-07 RX ADMIN — ASPIRIN 81 MG: 81 TABLET, COATED ORAL at 09:05

## 2020-05-07 RX ADMIN — METOPROLOL TARTRATE 25 MG: 25 TABLET, FILM COATED ORAL at 09:05

## 2020-05-07 RX ADMIN — CEFTRIAXONE SODIUM 1 G: 1 INJECTION, POWDER, FOR SOLUTION INTRAMUSCULAR; INTRAVENOUS at 21:53

## 2020-05-07 RX ADMIN — VANCOMYCIN HYDROCHLORIDE 1250 MG: 10 INJECTION, POWDER, LYOPHILIZED, FOR SOLUTION INTRAVENOUS at 22:41

## 2020-05-07 RX ADMIN — ATORVASTATIN CALCIUM 20 MG: 10 TABLET, FILM COATED ORAL at 21:50

## 2020-05-07 RX ADMIN — ACETAMINOPHEN 650 MG: 325 TABLET ORAL at 00:54

## 2020-05-07 RX ADMIN — METOPROLOL TARTRATE 25 MG: 25 TABLET, FILM COATED ORAL at 00:57

## 2020-05-07 RX ADMIN — INSULIN LISPRO 2 UNITS: 100 INJECTION, SOLUTION INTRAVENOUS; SUBCUTANEOUS at 09:10

## 2020-05-07 RX ADMIN — VANCOMYCIN HYDROCHLORIDE 1250 MG: 10 INJECTION, POWDER, LYOPHILIZED, FOR SOLUTION INTRAVENOUS at 09:05

## 2020-05-07 RX ADMIN — Medication 10 ML: at 21:54

## 2020-05-07 ASSESSMENT — PAIN DESCRIPTION - LOCATION: LOCATION: HEAD

## 2020-05-07 ASSESSMENT — PAIN SCALES - GENERAL
PAINLEVEL_OUTOF10: 5
PAINLEVEL_OUTOF10: 4
PAINLEVEL_OUTOF10: 3
PAINLEVEL_OUTOF10: 5

## 2020-05-07 ASSESSMENT — PAIN DESCRIPTION - PAIN TYPE: TYPE: ACUTE PAIN

## 2020-05-07 ASSESSMENT — PAIN DESCRIPTION - DESCRIPTORS: DESCRIPTORS: ACHING

## 2020-05-07 NOTE — H&P
10 mg by mouth daily  7/14/14   Historical Provider, MD   lisinopril (PRINIVIL;ZESTRIL) 20 MG tablet TAKE 2 TABLETS BY MOUTH DAILY 9/1/15   Aldair Gonzales MD   metFORMIN (GLUCOPHAGE) 1000 MG tablet Take 1 tablet by mouth 2 times daily (with meals). Needs office visit with Dr. Fany Luna prior to additional refills 2/23/15   Aldair Gonzales MD   Exenatide (BYDUREON SC) Inject 2 mg into the skin once a week     Historical Provider, MD   Omega-3 Fatty Acids (FISH OIL) 1000 MG CAPS Take 3,000 mg by mouth 3 times daily. Historical Provider, MD   aspirin 81 MG EC tablet Take 81 mg by mouth daily. Historical Provider, MD   CINNAMON PO Take 1,000 mg by mouth 2 times daily. Historical Provider, MD   Multiple Vitamin (MULTI-VITAMIN PO) Take  by mouth. Historical Provider, MD       Allergies:  Latex    Social History:  The patient currently lives home    TOBACCO:   reports that he quit smoking about 29 years ago. He has a 22.50 pack-year smoking history. He has never used smokeless tobacco.  ETOH:   reports no history of alcohol use. Family History:  Reviewed in detail and negative for DM, Early CAD, Cancer, CVA. Positive as follows:        Problem Relation Age of Onset    Cancer Mother         breast       REVIEW OF SYSTEMS:   Positive for knee pain  and as noted in the HPI. All other systems reviewed and negative. PHYSICAL EXAM:    /66   Pulse 130   Temp 99.9 °F (37.7 °C) (Oral)   Resp 18   Ht 6' 2\" (1.88 m)   Wt 210 lb (95.3 kg)   SpO2 97%   BMI 26.96 kg/m²     General appearance: No apparent distress appears stated age and cooperative. HEENT Normal cephalic, atraumatic without obvious deformity. Pupils equal, round, and reactive to light. Extra ocular muscles intact. Conjunctivae/corneas clear. Neck: Supple, No jugular venous distention/bruits. Trachea midline without thyromegaly or adenopathy with full range of motion.   Lungs: Clear to auscultation, bilaterally without Rales/Wheezes/Rhonchi with good respiratory effort. Heart: Regular rate and rhythm with Normal S1/S2 without murmurs, rubs or gallops, point of maximum impulse non-displaced  Abdomen: Soft, non-tender or non-distended without rigidity or guarding and positive bowel sounds all four quadrants. Extremities: No clubbing, cyanosis, or edema bilaterally. Full range of motion without deformity and normal gait intact. Considerable erythema of the prepatellar area with some mild serous drainage the small area the middle of the patella  Skin: Skin color, texture, turgor normal.  No rashes or lesions. Neurologic: Alert and oriented X 3, neurovascularly intact with sensory/motor intact upper extremities/lower extremities, bilaterally. Cranial nerves: II-XII intact, grossly non-focal.  Mental status: Alert, oriented, thought content appropriate. Capillary Refill: Acceptable  < 3 seconds  Peripheral Pulses: +3 Easily felt, not easily obliterated with pressure      CXR:  I have reviewed the CXR EKG:  I have reviewed the EKG   CBC   Recent Labs     05/06/20  2200   WBC 10.8   HGB 13.9   HCT 41.9         RENAL  Recent Labs     05/06/20  2200   *   K 5.3*   CL 93*   CO2 20*   BUN 19   CREATININE 0.9     LFT'S  Recent Labs     05/06/20  2200   AST 15   ALT 18   BILITOT 0.8   ALKPHOS 95     COAG  No results for input(s): INR in the last 72 hours. CARDIAC ENZYMES  No results for input(s): CKTOTAL, CKMB, CKMBINDEX, TROPONINI in the last 72 hours.     U/A:  No results found for: NITRITE, COLORU, WBCUA, RBCUA, MUCUS, BACTERIA, CLARITYU, SPECGRAV, LEUKOCYTESUR, BLOODU, GLUCOSEU, AMORPHOUS    ABG  No results found for: EZI7VRW, BEART, B5HNIFRB, PHART, THGBART, LLT6AZU, PO2ART, ALV4WSO        Active Hospital Problems    Diagnosis Date Noted    Prepatellar abscess [L02.419] 05/06/2020         PHYSICIANS CERTIFICATION:    I certify that Magalys Cazares is expected to be hospitalized for more than 2 midnights based on the

## 2020-05-07 NOTE — PROGRESS NOTES
Hospitalist Progress Note      PCP: Mis Mcfadden MD    Date of Admission: 5/6/2020    Chief Complaint: Left knee pain    Hospital Course: This is a 68-year-old male with past medical history of hypertension, hyperlipidemia, diabetes mellitus type 2 admitted with a left knee pain diagnosis of prepatellar abscess after injury from [employer] [address] [employer] 3 weeks ago. Orthopedic consulted and following    Subjective: Patient denies any chest pain or shortness of breath no cough no sputum no nausea vomiting he stated that he got injured from [employer] [address] [employer] 3 weeks ago in his left knee. Medications:  Reviewed    Infusion Medications   Scheduled Medications    aspirin  81 mg Oral Daily    atorvastatin  20 mg Oral Nightly    lisinopril  20 mg Oral Daily    metoprolol tartrate  25 mg Oral BID    sodium chloride flush  10 mL Intravenous 2 times per day    enoxaparin  40 mg Subcutaneous Daily    cefTRIAXone (ROCEPHIN) IV  1 g Intravenous Q24H    insulin lispro  0-12 Units Subcutaneous TID WC    insulin lispro  0-6 Units Subcutaneous Nightly    vancomycin  1,250 mg Intravenous Q12H     PRN Meds: sodium chloride flush, acetaminophen **OR** acetaminophen, polyethylene glycol, promethazine **OR** ondansetron    No intake or output data in the 24 hours ending 05/07/20 1032    Physical Exam Performed:    /72   Pulse 101   Temp 98.2 °F (36.8 °C) (Oral)   Resp 16   Ht 6' 2\" (1.88 m)   Wt 210 lb (95.3 kg)   SpO2 95%   BMI 26.96 kg/m²     General appearance: No apparent distress, appears stated age and cooperative. HEENT: Pupils equal, round, and reactive to light. Conjunctivae/corneas clear. Neck: Supple, with full range of motion. No jugular venous distention. Trachea midline. Respiratory:  Normal respiratory effort. Clear to auscultation, bilaterally without Rales/Wheezes/Rhonchi. Cardiovascular: Regular rate and rhythm with normal S1/S2 without murmurs, rubs or gallops.   Abdomen: Soft, non-tender,

## 2020-05-08 LAB
ANION GAP SERPL CALCULATED.3IONS-SCNC: 17 MMOL/L (ref 3–16)
BUN BLDV-MCNC: 13 MG/DL (ref 7–20)
CALCIUM SERPL-MCNC: 9.7 MG/DL (ref 8.3–10.6)
CHLORIDE BLD-SCNC: 95 MMOL/L (ref 99–110)
CO2: 23 MMOL/L (ref 21–32)
CREAT SERPL-MCNC: 0.6 MG/DL (ref 0.8–1.3)
ESTIMATED AVERAGE GLUCOSE: 220.2 MG/DL
GFR AFRICAN AMERICAN: >60
GFR NON-AFRICAN AMERICAN: >60
GLUCOSE BLD-MCNC: 144 MG/DL (ref 70–99)
GLUCOSE BLD-MCNC: 158 MG/DL (ref 70–99)
GLUCOSE BLD-MCNC: 169 MG/DL (ref 70–99)
GLUCOSE BLD-MCNC: 197 MG/DL (ref 70–99)
GLUCOSE BLD-MCNC: 210 MG/DL (ref 70–99)
HBA1C MFR BLD: 9.3 %
HCT VFR BLD CALC: 41.9 % (ref 40.5–52.5)
HEMOGLOBIN: 13.9 G/DL (ref 13.5–17.5)
MCH RBC QN AUTO: 29.7 PG (ref 26–34)
MCHC RBC AUTO-ENTMCNC: 33.1 G/DL (ref 31–36)
MCV RBC AUTO: 89.6 FL (ref 80–100)
MRSA SCREEN RT-PCR: NORMAL
PDW BLD-RTO: 12.6 % (ref 12.4–15.4)
PERFORMED ON: ABNORMAL
PLATELET # BLD: 377 K/UL (ref 135–450)
PMV BLD AUTO: 7.4 FL (ref 5–10.5)
POTASSIUM SERPL-SCNC: 4.7 MMOL/L (ref 3.5–5.1)
PROCALCITONIN: 0.22 NG/ML (ref 0–0.15)
RBC # BLD: 4.68 M/UL (ref 4.2–5.9)
SODIUM BLD-SCNC: 135 MMOL/L (ref 136–145)
VANCOMYCIN TROUGH: 8.1 UG/ML (ref 10–20)
WBC # BLD: 7 K/UL (ref 4–11)

## 2020-05-08 PROCEDURE — 85027 COMPLETE CBC AUTOMATED: CPT

## 2020-05-08 PROCEDURE — 80048 BASIC METABOLIC PNL TOTAL CA: CPT

## 2020-05-08 PROCEDURE — 36415 COLL VENOUS BLD VENIPUNCTURE: CPT

## 2020-05-08 PROCEDURE — 2580000003 HC RX 258: Performed by: INTERNAL MEDICINE

## 2020-05-08 PROCEDURE — 1200000000 HC SEMI PRIVATE

## 2020-05-08 PROCEDURE — 6370000000 HC RX 637 (ALT 250 FOR IP): Performed by: INTERNAL MEDICINE

## 2020-05-08 PROCEDURE — 80202 ASSAY OF VANCOMYCIN: CPT

## 2020-05-08 PROCEDURE — 99221 1ST HOSP IP/OBS SF/LOW 40: CPT | Performed by: PHYSICIAN ASSISTANT

## 2020-05-08 PROCEDURE — 6360000002 HC RX W HCPCS: Performed by: INTERNAL MEDICINE

## 2020-05-08 PROCEDURE — 84145 PROCALCITONIN (PCT): CPT

## 2020-05-08 RX ORDER — LACTOBACILLUS RHAMNOSUS GG 10B CELL
1 CAPSULE ORAL DAILY
Status: DISCONTINUED | OUTPATIENT
Start: 2020-05-08 | End: 2020-05-10 | Stop reason: HOSPADM

## 2020-05-08 RX ADMIN — ASPIRIN 81 MG: 81 TABLET, COATED ORAL at 08:29

## 2020-05-08 RX ADMIN — METFORMIN HYDROCHLORIDE 1000 MG: 500 TABLET ORAL at 17:57

## 2020-05-08 RX ADMIN — VANCOMYCIN HYDROCHLORIDE 1250 MG: 10 INJECTION, POWDER, LYOPHILIZED, FOR SOLUTION INTRAVENOUS at 17:58

## 2020-05-08 RX ADMIN — Medication 1 CAPSULE: at 14:56

## 2020-05-08 RX ADMIN — LISINOPRIL 20 MG: 20 TABLET ORAL at 08:29

## 2020-05-08 RX ADMIN — Medication 10 ML: at 08:35

## 2020-05-08 RX ADMIN — INSULIN LISPRO 1 UNITS: 100 INJECTION, SOLUTION INTRAVENOUS; SUBCUTANEOUS at 21:40

## 2020-05-08 RX ADMIN — INSULIN LISPRO 2 UNITS: 100 INJECTION, SOLUTION INTRAVENOUS; SUBCUTANEOUS at 08:30

## 2020-05-08 RX ADMIN — VANCOMYCIN HYDROCHLORIDE 1250 MG: 10 INJECTION, POWDER, LYOPHILIZED, FOR SOLUTION INTRAVENOUS at 10:12

## 2020-05-08 RX ADMIN — ACETAMINOPHEN 650 MG: 325 TABLET ORAL at 21:39

## 2020-05-08 RX ADMIN — ATORVASTATIN CALCIUM 20 MG: 10 TABLET, FILM COATED ORAL at 21:35

## 2020-05-08 RX ADMIN — METOPROLOL TARTRATE 25 MG: 25 TABLET, FILM COATED ORAL at 21:35

## 2020-05-08 RX ADMIN — ENOXAPARIN SODIUM 40 MG: 40 INJECTION SUBCUTANEOUS at 08:29

## 2020-05-08 RX ADMIN — CEFTRIAXONE SODIUM 1 G: 1 INJECTION, POWDER, FOR SOLUTION INTRAMUSCULAR; INTRAVENOUS at 21:36

## 2020-05-08 RX ADMIN — Medication 10 ML: at 21:48

## 2020-05-08 RX ADMIN — INSULIN LISPRO 2 UNITS: 100 INJECTION, SOLUTION INTRAVENOUS; SUBCUTANEOUS at 17:59

## 2020-05-08 RX ADMIN — METOPROLOL TARTRATE 25 MG: 25 TABLET, FILM COATED ORAL at 08:29

## 2020-05-08 RX ADMIN — INSULIN LISPRO 4 UNITS: 100 INJECTION, SOLUTION INTRAVENOUS; SUBCUTANEOUS at 12:08

## 2020-05-08 ASSESSMENT — PAIN SCALES - GENERAL
PAINLEVEL_OUTOF10: 3
PAINLEVEL_OUTOF10: 4
PAINLEVEL_OUTOF10: 2

## 2020-05-08 NOTE — PROGRESS NOTES
Hospitalist Progress Note      PCP: Karine Arreguin MD    Date of Admission: 5/6/2020    Chief Complaint: Left knee pain    Hospital Course: Admitted with left prepatellar bursitis, failure to heal despite oral antibiotics. On IV antibiotics. Cultures are pending. Orthopedic surgery has seen and planning I&D for tomorrow. Subjective: Left knee pain and swelling. No chest pain, no shortness of breath, no nausea, no vomiting      Medications:  Reviewed    Infusion Medications   Scheduled Medications    vancomycin  1,250 mg Intravenous Q8H    aspirin  81 mg Oral Daily    atorvastatin  20 mg Oral Nightly    lisinopril  20 mg Oral Daily    metoprolol tartrate  25 mg Oral BID    sodium chloride flush  10 mL Intravenous 2 times per day    enoxaparin  40 mg Subcutaneous Daily    cefTRIAXone (ROCEPHIN) IV  1 g Intravenous Q24H    insulin lispro  0-12 Units Subcutaneous TID WC    insulin lispro  0-6 Units Subcutaneous Nightly     PRN Meds: sodium chloride flush, acetaminophen **OR** acetaminophen, polyethylene glycol, promethazine **OR** ondansetron      Intake/Output Summary (Last 24 hours) at 5/8/2020 1333  Last data filed at 5/8/2020 0031  Gross per 24 hour   Intake 490 ml   Output --   Net 490 ml       Physical Exam Performed:    /73   Pulse 107   Temp 98.1 °F (36.7 °C) (Oral)   Resp 16   Ht 6' 2\" (1.88 m)   Wt 210 lb (95.3 kg)   SpO2 93%   BMI 26.96 kg/m²     General appearance: No apparent distress, appears stated age and cooperative. HEENT: Pupils equal, round, and reactive to light. Conjunctivae/corneas clear. Neck: Supple, with full range of motion. No jugular venous distention. Trachea midline. Respiratory:  Normal respiratory effort. Clear to auscultation, bilaterally without Rales/Wheezes/Rhonchi. Cardiovascular: Regular rate and rhythm with normal S1/S2 without murmurs, rubs or gallops. Abdomen: Soft, non-tender, non-distended with normal bowel sounds.   Musculoskeletal:

## 2020-05-09 LAB
GLUCOSE BLD-MCNC: 184 MG/DL (ref 70–99)
GLUCOSE BLD-MCNC: 200 MG/DL (ref 70–99)
GLUCOSE BLD-MCNC: 212 MG/DL (ref 70–99)
GLUCOSE BLD-MCNC: 220 MG/DL (ref 70–99)
PERFORMED ON: ABNORMAL
VANCOMYCIN TROUGH: 14.5 UG/ML (ref 10–20)
VANCOMYCIN TROUGH: 19.9 UG/ML (ref 10–20)

## 2020-05-09 PROCEDURE — 6360000002 HC RX W HCPCS: Performed by: INTERNAL MEDICINE

## 2020-05-09 PROCEDURE — 80202 ASSAY OF VANCOMYCIN: CPT

## 2020-05-09 PROCEDURE — 6370000000 HC RX 637 (ALT 250 FOR IP): Performed by: INTERNAL MEDICINE

## 2020-05-09 PROCEDURE — 1200000000 HC SEMI PRIVATE

## 2020-05-09 PROCEDURE — 2580000003 HC RX 258

## 2020-05-09 PROCEDURE — 36415 COLL VENOUS BLD VENIPUNCTURE: CPT

## 2020-05-09 PROCEDURE — 2580000003 HC RX 258: Performed by: INTERNAL MEDICINE

## 2020-05-09 RX ORDER — DEXTROSE MONOHYDRATE 25 G/50ML
12.5 INJECTION, SOLUTION INTRAVENOUS PRN
Status: DISCONTINUED | OUTPATIENT
Start: 2020-05-09 | End: 2020-05-10 | Stop reason: HOSPADM

## 2020-05-09 RX ORDER — DEXTROSE MONOHYDRATE 50 MG/ML
100 INJECTION, SOLUTION INTRAVENOUS PRN
Status: DISCONTINUED | OUTPATIENT
Start: 2020-05-09 | End: 2020-05-10 | Stop reason: HOSPADM

## 2020-05-09 RX ORDER — NICOTINE POLACRILEX 4 MG
15 LOZENGE BUCCAL PRN
Status: DISCONTINUED | OUTPATIENT
Start: 2020-05-09 | End: 2020-05-10 | Stop reason: HOSPADM

## 2020-05-09 RX ORDER — SODIUM CHLORIDE 9 MG/ML
INJECTION, SOLUTION INTRAVENOUS
Status: COMPLETED
Start: 2020-05-09 | End: 2020-05-09

## 2020-05-09 RX ORDER — INSULIN GLARGINE 100 [IU]/ML
5 INJECTION, SOLUTION SUBCUTANEOUS NIGHTLY
Status: DISCONTINUED | OUTPATIENT
Start: 2020-05-09 | End: 2020-05-10 | Stop reason: HOSPADM

## 2020-05-09 RX ADMIN — ACETAMINOPHEN 650 MG: 325 TABLET ORAL at 14:12

## 2020-05-09 RX ADMIN — CEFTRIAXONE SODIUM 1 G: 1 INJECTION, POWDER, FOR SOLUTION INTRAMUSCULAR; INTRAVENOUS at 20:36

## 2020-05-09 RX ADMIN — VANCOMYCIN HYDROCHLORIDE 1250 MG: 10 INJECTION, POWDER, LYOPHILIZED, FOR SOLUTION INTRAVENOUS at 03:15

## 2020-05-09 RX ADMIN — Medication 1 CAPSULE: at 09:08

## 2020-05-09 RX ADMIN — METOPROLOL TARTRATE 25 MG: 25 TABLET, FILM COATED ORAL at 09:08

## 2020-05-09 RX ADMIN — Medication 10 ML: at 20:25

## 2020-05-09 RX ADMIN — LISINOPRIL 20 MG: 20 TABLET ORAL at 09:08

## 2020-05-09 RX ADMIN — ACETAMINOPHEN 650 MG: 325 TABLET ORAL at 20:25

## 2020-05-09 RX ADMIN — Medication 10 ML: at 09:09

## 2020-05-09 RX ADMIN — INSULIN LISPRO 4 UNITS: 100 INJECTION, SOLUTION INTRAVENOUS; SUBCUTANEOUS at 11:43

## 2020-05-09 RX ADMIN — ENOXAPARIN SODIUM 40 MG: 40 INJECTION SUBCUTANEOUS at 09:08

## 2020-05-09 RX ADMIN — VANCOMYCIN HYDROCHLORIDE 1000 MG: 10 INJECTION, POWDER, LYOPHILIZED, FOR SOLUTION INTRAVENOUS at 14:12

## 2020-05-09 RX ADMIN — INSULIN GLARGINE 5 UNITS: 100 INJECTION, SOLUTION SUBCUTANEOUS at 20:31

## 2020-05-09 RX ADMIN — INSULIN LISPRO 2 UNITS: 100 INJECTION, SOLUTION INTRAVENOUS; SUBCUTANEOUS at 20:32

## 2020-05-09 RX ADMIN — METFORMIN HYDROCHLORIDE 1000 MG: 500 TABLET ORAL at 09:08

## 2020-05-09 RX ADMIN — ASPIRIN 81 MG: 81 TABLET, COATED ORAL at 09:08

## 2020-05-09 RX ADMIN — VANCOMYCIN HYDROCHLORIDE 1000 MG: 10 INJECTION, POWDER, LYOPHILIZED, FOR SOLUTION INTRAVENOUS at 21:49

## 2020-05-09 RX ADMIN — INSULIN LISPRO 4 UNITS: 100 INJECTION, SOLUTION INTRAVENOUS; SUBCUTANEOUS at 17:03

## 2020-05-09 RX ADMIN — INSULIN LISPRO 2 UNITS: 100 INJECTION, SOLUTION INTRAVENOUS; SUBCUTANEOUS at 09:09

## 2020-05-09 RX ADMIN — SODIUM CHLORIDE: 900 INJECTION, SOLUTION INTRAVENOUS at 03:15

## 2020-05-09 RX ADMIN — ATORVASTATIN CALCIUM 20 MG: 10 TABLET, FILM COATED ORAL at 20:25

## 2020-05-09 ASSESSMENT — PAIN DESCRIPTION - DESCRIPTORS: DESCRIPTORS: ACHING

## 2020-05-09 ASSESSMENT — PAIN SCALES - GENERAL
PAINLEVEL_OUTOF10: 3
PAINLEVEL_OUTOF10: 0
PAINLEVEL_OUTOF10: 3
PAINLEVEL_OUTOF10: 2

## 2020-05-09 ASSESSMENT — PAIN DESCRIPTION - LOCATION: LOCATION: KNEE

## 2020-05-09 ASSESSMENT — PAIN DESCRIPTION - FREQUENCY: FREQUENCY: CONTINUOUS

## 2020-05-09 ASSESSMENT — PAIN DESCRIPTION - ONSET: ONSET: ON-GOING

## 2020-05-09 ASSESSMENT — PAIN DESCRIPTION - PAIN TYPE: TYPE: ACUTE PAIN

## 2020-05-09 NOTE — PROGRESS NOTES
Hospitalist Progress Note      PCP: Gina Taylor MD    Date of Admission: 5/6/2020    Chief Complaint: Left knee pain    Hospital Course: Admitted with left prepatellar bursitis, failure to heal despite oral antibiotics. On IV antibiotics. Orthopedic surgery consulted. Subjective: Left knee pain and swelling/ertyhema  improving   No fevers. No new complaints      Medications:  Reviewed    Infusion Medications   Scheduled Medications    vancomycin  1,250 mg Intravenous Q8H    lactobacillus  1 capsule Oral Daily    aspirin  81 mg Oral Daily    atorvastatin  20 mg Oral Nightly    lisinopril  20 mg Oral Daily    metoprolol tartrate  25 mg Oral BID    sodium chloride flush  10 mL Intravenous 2 times per day    enoxaparin  40 mg Subcutaneous Daily    cefTRIAXone (ROCEPHIN) IV  1 g Intravenous Q24H    insulin lispro  0-12 Units Subcutaneous TID WC    insulin lispro  0-6 Units Subcutaneous Nightly     PRN Meds: sodium chloride flush, acetaminophen **OR** acetaminophen, polyethylene glycol, promethazine **OR** ondansetron      Intake/Output Summary (Last 24 hours) at 5/9/2020 1112  Last data filed at 5/9/2020 0520  Gross per 24 hour   Intake 560 ml   Output --   Net 560 ml       Physical Exam Performed:    /76   Pulse 101   Temp 97.9 °F (36.6 °C) (Oral)   Resp 18   Ht 6' 2\" (1.88 m)   Wt 210 lb (95.3 kg)   SpO2 97%   BMI 26.96 kg/m²     General appearance: No apparent distress, appears stated age and cooperative. HEENT: Pupils equal, round, and reactive to light. Conjunctivae/corneas clear. Neck: Supple, with full range of motion. No jugular venous distention. Trachea midline. Respiratory:  Normal respiratory effort. Clear to auscultation, bilaterally without Rales/Wheezes/Rhonchi. Cardiovascular: Regular rate and rhythm with normal S1/S2 without murmurs, rubs or gallops. Abdomen: Soft, non-tender, non-distended with normal bowel sounds.   Musculoskeletal: No clubbing, cyanosis or

## 2020-05-10 VITALS
DIASTOLIC BLOOD PRESSURE: 81 MMHG | TEMPERATURE: 97.5 F | RESPIRATION RATE: 16 BRPM | HEART RATE: 101 BPM | OXYGEN SATURATION: 97 % | WEIGHT: 210 LBS | SYSTOLIC BLOOD PRESSURE: 125 MMHG | BODY MASS INDEX: 26.95 KG/M2 | HEIGHT: 74 IN

## 2020-05-10 LAB
BLOOD CULTURE, ROUTINE: NORMAL
CULTURE, BLOOD 2: NORMAL
GLUCOSE BLD-MCNC: 216 MG/DL (ref 70–99)
GLUCOSE BLD-MCNC: 237 MG/DL (ref 70–99)
PERFORMED ON: ABNORMAL
PERFORMED ON: ABNORMAL
VANCOMYCIN TROUGH: 13.6 UG/ML (ref 10–20)

## 2020-05-10 PROCEDURE — 6370000000 HC RX 637 (ALT 250 FOR IP): Performed by: INTERNAL MEDICINE

## 2020-05-10 PROCEDURE — 2580000003 HC RX 258: Performed by: INTERNAL MEDICINE

## 2020-05-10 PROCEDURE — 36415 COLL VENOUS BLD VENIPUNCTURE: CPT

## 2020-05-10 PROCEDURE — 80202 ASSAY OF VANCOMYCIN: CPT

## 2020-05-10 PROCEDURE — 6360000002 HC RX W HCPCS: Performed by: INTERNAL MEDICINE

## 2020-05-10 RX ORDER — CIPROFLOXACIN 500 MG/1
500 TABLET, FILM COATED ORAL 2 TIMES DAILY
Qty: 12 TABLET | Refills: 0 | Status: SHIPPED | OUTPATIENT
Start: 2020-05-10 | End: 2020-05-16

## 2020-05-10 RX ORDER — CLINDAMYCIN HYDROCHLORIDE 300 MG/1
600 CAPSULE ORAL 4 TIMES DAILY
Qty: 48 CAPSULE | Refills: 0 | Status: SHIPPED | OUTPATIENT
Start: 2020-05-10 | End: 2020-05-16

## 2020-05-10 RX ADMIN — Medication 10 ML: at 09:41

## 2020-05-10 RX ADMIN — ASPIRIN 81 MG: 81 TABLET, COATED ORAL at 09:38

## 2020-05-10 RX ADMIN — LISINOPRIL 20 MG: 20 TABLET ORAL at 09:38

## 2020-05-10 RX ADMIN — VANCOMYCIN HYDROCHLORIDE 1000 MG: 10 INJECTION, POWDER, LYOPHILIZED, FOR SOLUTION INTRAVENOUS at 14:39

## 2020-05-10 RX ADMIN — VANCOMYCIN HYDROCHLORIDE 1000 MG: 10 INJECTION, POWDER, LYOPHILIZED, FOR SOLUTION INTRAVENOUS at 05:57

## 2020-05-10 RX ADMIN — ENOXAPARIN SODIUM 40 MG: 40 INJECTION SUBCUTANEOUS at 09:38

## 2020-05-10 RX ADMIN — Medication 1 CAPSULE: at 09:37

## 2020-05-10 RX ADMIN — METOPROLOL TARTRATE 25 MG: 25 TABLET, FILM COATED ORAL at 09:37

## 2020-05-10 RX ADMIN — INSULIN LISPRO 4 UNITS: 100 INJECTION, SOLUTION INTRAVENOUS; SUBCUTANEOUS at 09:38

## 2020-05-10 ASSESSMENT — PAIN SCALES - GENERAL: PAINLEVEL_OUTOF10: 0

## 2020-05-10 NOTE — PROGRESS NOTES
discharge instructions given. Pt verbalized understanding denies any questions/ needs at this time. IV removed with no complications. Took patient to front lobby in wheelchair for discharge.

## 2020-05-10 NOTE — DISCHARGE SUMMARY
full range of motion. No jugular venous distention. Trachea midline. Respiratory:  Normal respiratory effort. Clear to auscultation, bilaterally without Rales/Wheezes/Rhonchi. Cardiovascular:  Regular rate and rhythm with normal S1/S2 without murmurs, rubs or gallops. Abdomen: Soft, non-tender, non-distended with normal bowel sounds. Musculoskeletal:  No clubbing, cyanosis or edema bilaterally. Full range of motion without deformity. Skin: Skin color, texture, turgor normal.  No rashes or lesions. Neurologic:  Neurovascularly intact without any focal sensory/motor deficits. Cranial nerves: II-XII intact, grossly non-focal.  Psychiatric:  Alert and oriented, thought content appropriate, normal insight  Capillary Refill: Brisk,< 3 seconds   Peripheral Pulses: +2 palpable, equal bilaterally       Labs: For convenience and continuity at follow-up the following most recent labs are provided:      CBC:    Lab Results   Component Value Date    WBC 7.0 05/08/2020    HGB 13.9 05/08/2020    HCT 41.9 05/08/2020     05/08/2020       Renal:    Lab Results   Component Value Date     05/08/2020    K 4.7 05/08/2020    K 4.4 05/07/2020    CL 95 05/08/2020    CO2 23 05/08/2020    BUN 13 05/08/2020    CREATININE 0.6 05/08/2020    CALCIUM 9.7 05/08/2020         Significant Diagnostic Studies    Radiology:   XR KNEE LEFT (1-2 VIEWS)   Final Result   1. No acute osseous abnormality of the left knee. 2. Prepatellar soft tissue swelling. No radiopaque foreign body.                 Consults:     IP CONSULT TO HOSPITALIST  PHARMACY TO DOSE VANCOMYCIN  IP CONSULT TO ORTHOPEDIC SURGERY    Disposition:  HOME      Condition at Discharge: Stable    Discharge Instructions/Follow-up:  F/u with ortho next weeks  F/u with endocrinology next week- can consider lantus for better DM control     Code Status:  Full Code     Activity: activity as tolerated    Diet: diabetic diet      Discharge Medications:     Current Discharge Medication List           Details   ciprofloxacin (CIPRO) 500 MG tablet Take 1 tablet by mouth 2 times daily for 6 days  Qty: 12 tablet, Refills: 0      clindamycin (CLEOCIN) 300 MG capsule Take 2 capsules by mouth 4 times daily for 6 days  Qty: 48 capsule, Refills: 0              Details   Probiotic Product (FORTIFY DAILY PROBIOTIC PO) Take 1 tablet by mouth daily      Empagliflozin (JARDIANCE PO) Take 25 mg by mouth daily       metoprolol tartrate (LOPRESSOR) 25 MG tablet Take 1 tablet by mouth 2 times daily  Qty: 180 tablet, Refills: 3    Associated Diagnoses: Tachycardia      atorvastatin (LIPITOR) 20 MG tablet 1 daily      glipiZIDE (GLUCOTROL) 5 MG tablet Take 10 mg by mouth daily       lisinopril (PRINIVIL;ZESTRIL) 20 MG tablet TAKE 2 TABLETS BY MOUTH DAILY  Qty: 180 tablet, Refills: 0    Comments: Needs office visit with Dr. Latanya Opitz prior to additional refills      metFORMIN (GLUCOPHAGE) 1000 MG tablet Take 1 tablet by mouth 2 times daily (with meals). Needs office visit with Dr. Latanya Opitz prior to additional refills  Qty: 180 tablet, Refills: 0      Exenatide (BYDUREON SC) Inject 2 mg into the skin once a week       Omega-3 Fatty Acids (FISH OIL) 1000 MG CAPS Take 3,000 mg by mouth 3 times daily. aspirin 81 MG EC tablet Take 81 mg by mouth daily. CINNAMON PO Take 1,000 mg by mouth 2 times daily. Multiple Vitamin (MULTI-VITAMIN PO) Take  by mouth. Time Spent on discharge is more than 30 Mts in the examination, evaluation, counseling and review of medications and discharge plan. Signed:    Kennedi Eddy MD   5/10/2020      Thank you Deisy Riley MD for the opportunity to be involved in this patient's care. If you have any questions or concerns please feel free to contact me at [phone].

## 2020-05-10 NOTE — PLAN OF CARE
Problem: Falls - Risk of:  Goal: Will remain free from falls  Description: Will remain free from falls  5/10/2020 1054 by Alcides Nava RN  Outcome: Ongoing     Problem: Pain:  Goal: Pain level will decrease  Description: Pain level will decrease  Outcome: Ongoing     Problem: Nutritional:  Goal: Nutritional status will improve  Description: Nutritional status will improve  Outcome: Ongoing

## 2020-05-10 NOTE — PROGRESS NOTES
Department of Orthopedic Surgery  Attending  Consultant progress note           Reason for Consult:  Left septic prepatellar bursitis  Requesting Fany Luna MD  Date of [address]     History Obtained From:  patient, electronic medical record     HISTORY OF PRESENT ILLNESS:      Feeling better yet today. Has been up and around in room. No significant drainage or pain from left knee.               The patient is a 60 y. o. male who presented with above chief complaint.  Was seen in my office date of admission but developed and fever and was admitted. He had first been treated by his primary care physician with Keflex. In my office his knee was reportedly less swollen and less erythemitis than it had been. Therefore Bactrim was added for better coverage and plan was to see him in 48 hours but as stated he started to have fever and presented to ER.     He would not be a good wound/incision healing candidate. I would prefer not to perform surgery if possible due to significant risk of problems with wound healing.     Past Medical History:    Past Medical History                Diagnosis Date    Diabetes mellitus ([address]      Hyperlipidemia      Hypertension           Past Surgical History:    Past Surgical History                 Procedure Laterality Date    SHOULDER ARTHROSCOPY         right               Medications Prior to Admission:   Home Medications                 Prior to Admission medications    Medication Sig Start Date End Date Taking?  Authorizing Provider   Probiotic Product (FORTIFY DAILY PROBIOTIC PO) Take 1 tablet by mouth daily     Yes Historical Provider, MD   sulfamethoxazole-trimethoprim (BACTRIM DS;SEPTRA DS) 800-160 MG per tablet Take 1 tablet by mouth 2 times daily for 10 days 5/6/20 5/16/20   Carmen Black PA-C   cephALEXin [facility]) 500 MG capsule Take 1 capsule by mouth 4 times daily for 7 days 5/1/20 5/8/20   Jimmie GASPAR

## 2020-05-11 ENCOUNTER — TELEPHONE (OUTPATIENT)
Dept: INTERNAL MEDICINE CLINIC | Age: 65
End: 2020-05-11

## 2020-05-15 ENCOUNTER — OFFICE VISIT (OUTPATIENT)
Dept: ORTHOPEDIC SURGERY | Age: 65
End: 2020-05-15
Payer: COMMERCIAL

## 2020-05-15 VITALS — HEIGHT: 74 IN | BODY MASS INDEX: 26.96 KG/M2 | WEIGHT: 210.1 LBS

## 2020-05-15 PROCEDURE — 99213 OFFICE O/P EST LOW 20 MIN: CPT | Performed by: ORTHOPAEDIC SURGERY

## 2020-05-15 NOTE — PROGRESS NOTES
MD Lizeth Caraballo [facility]ks         Orthopaedic Surgery and Sports Medicine    Patient Name: Lis Caballero  YOB: 1955  Patient's PCP is Lenora Cespedes MD    SUBJECTIVE  Chief Complaint:  Knee Pain (left knee)       History of Present Illness:  Vera Mortimer is a 60 y.o. male here regarding left knee pain. This a gentleman that was previously seen here in the office on 5/6/2020 and had actually a septic prepatellar bursa that was improving. Later that evening he developed a fever and had some chills and went to the emergency room and was admitted to [facility] for treatment. He was admitted to the hospitalist.  We were eventually consulted and saw him in the hospital just this past weekend. In the hospital we really did not do much other than discussed the treatment of his knee he had already been placed on oral IV antibiotics. He was discharged home on Sunday of this week and was on oral antibiotics. He has been on them this week. He continues to get an improvement. Today he states his knee certainly feels better than it has he is walking better. He still uses a cane for ambulation. He feels that he has better range of motion. There is less erythema and there has been no drainage. He was referred by his primary care physician Dr. Dayana Dominguez. The patient is currently ambulating with a cane    Difficulty walking: Yes    Location: anterior  Quality: Minimal discomfort at this point. Pain Scale: 2/10    Sleep pattern is affected by the chief complaint: No  The patient has not had PT. The patient has not had an injection. The patient has not taken NSAIDs. Previous surgery on the affected joint: No    The patient is not working. Patient is a type II diabetic on 2 different different hypoglycemic medications.   Not aware of his most Dr. Latanya Opitz prior to additional refills 180 tablet 0    Exenatide (BYDUREON SC) Inject 2 mg into the skin once a week       Omega-3 Fatty Acids (FISH OIL) 1000 MG CAPS Take 3,000 mg by mouth 3 times daily.  aspirin 81 MG EC tablet Take 81 mg by mouth daily.  CINNAMON PO Take 1,000 mg by mouth 2 times daily.  Multiple Vitamin (MULTI-VITAMIN PO) Take  by mouth. No current facility-administered medications for this visit. Coagulation:  On a blood thinner: No  History of a bleeding disorder: No  History of a previous blood clot: No    Goal for treatment: Improve function and decrease pain    OBJECTIVE  PHYSICAL EXAM  Vital Signs: There were no vitals filed for this visit. Body mass index is 26.96 kg/m². General Appearance: Patient is adequately groomed with no evidence of malnutrition   Orientation: Patient is alert and oriented to person, place and time  Mood and Affect: Neutral/Euthymic(normal)  Gait and Station: abnormal and antalgic. The patient can bear weight on the extremity. Left Knee Examination  Inspection:  Knee alignment: neutral  His knee is significantly better on inspection today than it was last week. Minimal ecchymosis today. No ecchymosis. Palpation: He has minimal tenderness to palpation today again no drainage. Range of Motion: Active: His range of motion today is dramatically improved at 0 to about 115 degrees. His knee is stable. Strength: No gross motor weakness noted. All muscle groups appear to be functioning. Motor exam of the lower extremities show quadriceps, hamstrings, foot dorsiflexion and plantarflexion grossly intact. Neurologic: Sensation to both feet is grossly intact to light touch. Vascular: The bilateral lower extremities are warm and well-perfused with brisk capillary refill. Lymphatic: The lymphatic examination bilaterally reveals all areas to be without enlargement or induration.   He does not have evidence of possible complication. Vera Mortimer was instructed to call the office if his symptoms worsen or if new symptoms appear prior to the next scheduled visit. He is specifically instructed to contact the office between now and schedule appointment if he has concerns related to his condition or if he needs assistance in scheduling any above tests. He is welcome to call for an appointment sooner if he has any additional concerns or questions. This dictation was performed with a verbal recognition program (DRAGON) and it was checked for errors. It is possible that there are still dictated errors within this office note. If so, please bring any errors to my attention for an addendum. All efforts were made to ensure that this office note is accurate.

## 2020-06-08 ENCOUNTER — OFFICE VISIT (OUTPATIENT)
Dept: ORTHOPEDIC SURGERY | Age: 65
End: 2020-06-08
Payer: COMMERCIAL

## 2020-06-08 VITALS — WEIGHT: 210 LBS | BODY MASS INDEX: 26.95 KG/M2 | HEIGHT: 74 IN

## 2020-06-08 PROCEDURE — 99213 OFFICE O/P EST LOW 20 MIN: CPT | Performed by: ORTHOPAEDIC SURGERY

## 2020-06-08 NOTE — PROGRESS NOTES
MD Earl [place], [place]         Orthopaedic Surgery and Sports Medicine    Patient Name: Brigitte Caballero  YOB: 1955  Patient's PCP is Felicia Lugo MD    SUBJECTIVE  Chief Complaint:  Knee Pain (LEFT     )       History of Present Illness:  Torrey Campos is a 63 y.o. male here regarding left knee pain. This is a gentleman that was seen primarily through the entire month of May with a left knee prepatellar bursitis. He was actually hospitalized from the emergency room placed on intravenous antibiotics and then back on oral antibiotics. He is here today for his long-term follow-up. Feels significantly better. Patient is a type II diabetic on 2 different different hypoglycemic medications. Not aware of his most recent blood sugar. Pain Assessment: 0    Review of Systems:  Bipin Caballero's review of systems has been performed by intake and observation. All past and current ROS forms have been scanned into the medical record. He has been instructed to contact his primary care provider regarding ROS issues if not already being addressed at this time. There are no recent changes.  The most recent ROS was scanned into media on 5/6/2020    Past Medical History:  (see most recent intake form scanned into media on above date)  Past Medical History:   Diagnosis Date    Diabetes mellitus ([address]     Hyperlipidemia     Hypertension        Past Surgical History:  (see most recent intake form scanned into media on above date)  Past Surgical History:   Procedure Laterality Date    SHOULDER ARTHROSCOPY      right       Allergies:  (see most recent intake form scanned into media on above date)  Allergies   Allergen Reactions    Latex Itching       Medications:  (see most recent intake form scanned into media on above date)  Current Outpatient Medications   Medication Sig of Motion: Active: Full range of motion of his left knee    His knee is stable. Strength: No gross motor weakness noted. All muscle groups appear to be functioning. Motor exam of the lower extremities show quadriceps, hamstrings, foot dorsiflexion and plantarflexion grossly intact. Neurologic: Sensation to both feet is grossly intact to light touch. Vascular: The bilateral lower extremities are warm and well-perfused with brisk capillary refill. Lymphatic: The lymphatic examination bilaterally reveals all areas to be without enlargement or induration. He does not have evidence of any lymph node involvement either behind the knee or in the groin. Skin: intact with no cellulitis, rashes, ulcerations, lymphedema or cutaneous lesions noted. Additional Examinations:  Right Lower Extremity: Examination of the right lower extremity does not show any tenderness, deformity or injury. Range of motion is unremarkable. There is no gross instability. There are no rashes, ulcerations or lesions. Strength and tone are normal.      DIAGNOSTICS  None      ASSESSMENT (Medical Decision Making)    Durga Goff is a 63 y.o. male with the following diagnosis: Left knee resolved prepatellar septic bursitis. No diagnosis found. PLAN (Medical Decision Making)  Office Procedures:  No orders of the defined types were placed in this encounter. Treatment Plan: Today he has been released. He has completed all of his antibiotics and has not had any recurrence since discontinuing the antibiotic Therapy. He ambulates well. He does use a cane but that is for balance purposes and not because of his left knee. Follow-up with us again as needed. Healthy Lifestyle Measures: Patient education measures were discussed and materials distributed where indicated. Posture education and proper lifting and carrying techniques. Weight management was discussed when indicated.     Non-steroidal anti-inflammatories medications (NSAIDs) can be used to assist with pain control and to reduce inflammatory changes. These medications may be over-the-counter or prescribed. We discussed taking the NSAID properly and the precautions. The patient understands that this medication may potentially interfere with other medications. Patient was also instructed to immediately discontinue the medication is there is any possible complication. Michel Allison was instructed to call the office if his symptoms worsen or if new symptoms appear prior to the next scheduled visit. He is specifically instructed to contact the office between now and schedule appointment if he has concerns related to his condition or if he needs assistance in scheduling any above tests. He is welcome to call for an appointment sooner if he has any additional concerns or questions. This dictation was performed with a verbal recognition program (DRAGON) and it was checked for errors. It is possible that there are still dictated errors within this office note. If so, please bring any errors to my attention for an addendum. All efforts were made to ensure that this office note is accurate.

## 2020-07-22 ENCOUNTER — HOSPITAL ENCOUNTER (OUTPATIENT)
Dept: PHYSICAL THERAPY | Age: 65
Setting detail: THERAPIES SERIES
Discharge: HOME | End: 2020-07-22
Payer: COMMERCIAL

## 2020-07-22 PROCEDURE — 97162 PT EVAL MOD COMPLEX 30 MIN: CPT

## 2020-07-22 PROCEDURE — 97110 THERAPEUTIC EXERCISES: CPT

## 2020-07-22 PROCEDURE — 97116 GAIT TRAINING THERAPY: CPT
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
## 2020-07-22 NOTE — FLOWSHEET NOTE
and weakness into L hand. Also neck  pain,                                      4/10 pain at present   6/10 pain at its worst      Objective:       Exercises:    Exercises in bold performed in department today. Items not bolded are carried forward from prior visits for continuity of the record. Exercise/Equipment Resistance/Repetitions HEP Other comments       []      Shoulder Diagnosis:   []       pendulums 20x CW and 20x CCW [x]       Assisted flexion supine 10 x 10 sec [x]       ER with cane at 90 deg abd 10 x 10 sec [x]       Sleeper stretch 10 x 10 sec [x]       Mid rows (seated ok for now) Red, 1x10 [x]        []        []          []         []        []      Vestibular Diagnosis:    []      Bridges  1x10 [x]      Standing heel raises 1x10 [x]        []        []        []      Therapeutic Exercise/Home Exercise Program:   40 minutes  Pt inst in role of PT, prognosis, plan of care, use of CP/HP, activity modification, and benefits of therapy. HEP has been established, See above, pt given handout(s) of new exercises. Access Code: [email or web address]   URL: [employer]/   Date: 07/22/2020   Prepared by: Mancel Saver     Exercises   · Circular Shoulder Pendulum with Table Support - 20 reps - 2 sets - 2x daily - 7x weekly   · Supine Shoulder Flexion AAROM - 10 reps - 10 hold - 2x daily - 7x weekly   · Supine Shoulder External Internal Rotation AAROM with Dowel - 10 reps - 10 hold - 2x daily - 7x weekly   · Sleeper Stretch - 10 reps - 10 hold - 2x daily - 7x weekly   · Standing Shoulder Row with Anchored Resistance - 10 reps - 3 sets - 2x daily - 7x weekly   · Bridge - 10 reps - 3 sets - 2x daily - 7x weekly   · Heel rises with counter support - 10 reps - 3 sets - 2x daily - 7x weekly   ·   Therapeutic Activity:  0 minutes     Gait: 10 minutes  Stair training, cues for correct technique    Neuromuscular Re-Education:  0 minutes      Canalith Repositioning Procedure:  0 minutes    Manual Therapy:  5 minutes  GH dist, inf and post glides L shoulder    Modalities: 0 minutes    Functional Outcome Measure:     Measure Used: Dizziness Handicap Inventory   Score: 46/100  Date Assessed: 7/22/20     Assessment/Treatment/Activity Tolerance:    Patients response to treatment:   [x]Patient tolerated treatment well []Patient limited by fatigue   []Patient limited by pain  []Patient limited by other medical complications   []Other:     Goals:     Shoulder GOALS      Short Term Goals:   4 weeks MET NOT MET Long Term Goals:  8  weeks MET NOT MET   1). Establish HEP []?  []?  1). Pt independent HEP []?  []?    2). Increase strength 1/3 grade in areas of deficits []?  []?  2). Increase strength to 4+/5 or better []?  []?    3). Improve Tinetti to 19/20 []?  []?  3). Improve Tinetti to 24/28 []?  []?    4). Improve DHI score to 36 or less  []?  []?  4). Improve DHI score to 26 or less []?  []?    5). Pt able to ambulate household distances without AD []?  []?  5). Pt able to do flight of steps with reciprocal pattern with 1 rail  []?  []?    6). []?  []?  6). Pt able to ambulate community distances without AD  []?  []?        Vestibular GOALS    Short Term Goals:   3  weeks MET Not MET Long Term Goals:    6 weeks MET Not Met   Establish HEP []?  []?  Pt independent with HEP []?  []?    Pain  5/10 or less []?  []?  Pain  3/10 or less []?  []?    Increase identified strength deficits 1/3 grade  []?  []?  Increase strength to 4+/5 or greater []?  []?    Increase UE PROM 20 degrees with IR and ER  And 10 degrees with abduction and flexion  []?  []?  Achieve WFL AROM  []?  []?    Report increased tolerance to ADLs without increased pain  []?  []?  Return to all ADLS without limitation []?  []?      []?  []?    []?  []?        Prognosis: [x]Good   []Fair   []Poor    Patient Requires Follow-up:  [x]Yes  []No    Plan: [x]Plan of care initiated     []Continue per plan of care    [] Alter current plan (see comments)    []Hold pending MD visit []Discharge    Timed Code Treatment Minutes:  55 (total with both diagnoses)    Total Treatment Minutes:   105 (total with both diagnoses)    Medicare Cap total YTD:   [x]N/A    Workers Comp Time Stamp  []N/A   Time In: 1030   Time Out: 9451  (Total for both diagnoses)    Electronically signed by:  Cathy Pacheco, OMT-C, [id number]

## 2020-07-27 ENCOUNTER — HOSPITAL ENCOUNTER (OUTPATIENT)
Dept: PHYSICAL THERAPY | Age: 65
Setting detail: THERAPIES SERIES
Discharge: HOME | End: 2020-07-27
Payer: COMMERCIAL

## 2020-07-27 PROCEDURE — 97140 MANUAL THERAPY 1/> REGIONS: CPT

## 2020-07-27 PROCEDURE — 97112 NEUROMUSCULAR REEDUCATION: CPT

## 2020-07-27 PROCEDURE — 97110 THERAPEUTIC EXERCISES: CPT

## 2020-07-27 NOTE — FLOWSHEET NOTE
Outpatient Physical Therapy     [x] Daily Treatment Note   [] Progress Note   [] Discharge Note    Date:  7/27/2020    Patient Name:  Laly Johns         YOB: 1955    Medical Diagnosis: unspecified disorder of vestibular system H81.90 ; ORIF clavicle, ICD 10 not on script                                                    Treatment Diagnosis:  Imbalance, dizziness/vertigo ;  L shoulder pain, decreased mobility, weakness          Onset Date:    2/14/2019             Referral Date:  7/17/20 (vestibular)  / 7/13/20 (shoulder)              Referring Physician:  Kat Mcrae (vestibular) / Dr. Barbara Amato (shoulder)                                                    Visits Allowed/Insurance/Certification Information:  Vestibular [facility], 16 visits approved through 9/25/20  / shoulder [facility], 12 visits approved until 8/21/20     Restrictions/Precautions: HTN, DM, hx of upper cervical fractures, recent neck surgery 6/2020     Plan of care sent to provider Hussein Trejo):      [x]Faxed  []Co-signature    (attempts: 1[x] 2 []3[])      Plan of care signed:      [x]Yes date: 7/27/20              Plan of care sent to provider Se Zavala):      [x]Faxed  []Co-signature    (attempts: 1[x] 2 []3[])     Plan of care signed:      [x]Yes date:   7/23/20            Progress Note covers period from (if applicable):    [x]NA    []From          To           Next Progress Note due:   8/12/20    Visit# / total visits:  Vestibular 2 /16  Shoulder 2 /12     Plan for Next Session:     Shoulder joint mobilization, PROM, progress ROM and strength   Progress LE strength and flexibility   Progress balance activities   Progress gait and steps as able    Subjective:  Very busy weekend. Daughter was ill (kidney stone) and staying with them. Did a lot of isai. Did exercises Sat, Sun and this am.   Having some trouble with the [address] exercise. Balance is about the same. Staggers.   If he gets up too fast, has to sit right back down. Pain level:   AT EVAL:   Patient reports L shoulder pain is  0/10 pain at present and  8/10 pain at its worst.  Pt also reports pain from cervical spine that radiates to L trap and goes to L elbow, pt reports constant numbness ventral fingers L hand and weakness into L hand. Also neck  pain,                                      4/10 pain at present   6/10 pain at its worst      Objective:         Exercises:    Exercises in bold performed in department today. Items not bolded are carried forward from prior visits for continuity of the record. Exercise/Equipment Resistance/Repetitions HEP Other comments       []      L Shoulder Diagnosis:   []       pendulums 20x CW and 20x CCW [x] Reviewed      Assisted flexion supine 4x10  [x] Advised to progress into newly obtained range      ER with cane at 90 deg abd 10 x 10 sec [x] Reviewed and form corrected. Tolerating well. Sleeper stretch 10 x 10 sec [x] Reviewed for HEP      Mid rows (seated ok for now) Red, 1x10 [x] Reviewed for HEP       []        []          []         []        []      Vestibular Diagnosis:    []      Bridges  1x15 [x]      Standing heel raises 1x15 [x]      Standing ABD  1x20 []        []      Step ups onto 6\" step, blue foam 2x20 []      Static stance on Blue foam  Bilat UE support, EO, 30 sec x 4  Single UE support, EO, 30 sec x 4  Single UE support (RIGHT), 30 sec x 2  Single UE support (LEFT), 10 sec x 5 *  Head nods , EC, bilat UE support x 20   Head turns, EC, bilat UE support, x 20   * falling back and to the left when releasing R UE support. Does maintain grasp of bar with L hand. March in place on blue foam  2x20      Static Stance on firm surface  EC, NO UE support, 30 sec x 4  Pt observed placing hands in pockets for this activity.      Therapeutic Exercise/Home Exercise Program:  30 minutes Shoulder ;   20 min Balance   Pt inst in role of PT, prognosis, plan of care, use of CP/HP, activity modification, and benefits of therapy. HEP has been established, See above,  Reviewed and recommend to continue as written - increasing to sets of 15    Shoulder ROM after manual tx:  PROM  158 in supine ; AROM in supine 118 ; AAROM 165 after pectoralis release. PROM ER with cane 72  Access Code: [email or web address]   URL: [email or web address]   Date: 07/22/2020   Prepared by: Puneet Abebe     Exercises   · Circular Shoulder Pendulum with Table Support - 20 reps - 2 sets - 2x daily - 7x weekly   · Supine Shoulder Flexion AAROM - 10 reps - 10 hold - 2x daily - 7x weekly   · Supine Shoulder External Internal Rotation AAROM with Dowel - 10 reps - 10 hold - 2x daily - 7x weekly   · Sleeper Stretch - 10 reps - 10 hold - 2x daily - 7x weekly   · Standing Shoulder Row with Anchored Resistance - 10 reps - 3 sets - 2x daily - 7x weekly   · Bridge - 10 reps - 3 sets - 2x daily - 7x weekly   · Heel rises with counter support - 10 reps - 3 sets - 2x daily - 7x weekly   ·   Therapeutic Activity:  0 minutes     Gait: 5 minutes  Amb 20 ft x 6 with no device, CGA.   Cues to increase stride length   Stair training, cues for correct technique    Neuromuscular Re-Education:  10 minutes  Static stance blue foam activities per chart above       Canalith Repositioning Procedure:  0 minutes    Manual Therapy:  25  minutes  GH dist, inf and post glides L shoulder  Deep release to Left UT/ Subscap/ Subclavius mms  RMM release to pectoralis mms  Long axis unwinding with PROM into flexion/ ext, IR/ Er, Abduction       Modalities: 0 minutes    Functional Outcome Measure:     Measure Used: Dizziness Handicap Inventory   Score: 46/100  Date Assessed: 7/22/20     Assessment/Treatment/Activity Tolerance:    Patients response to treatment:   [x]Patient tolerated treatment well []Patient limited by fatigue   []Patient limited by pain  []Patient limited by other medical complications   []Other:     Goals:     Shoulder GOALS      Short Term Goals:   4 weeks MET NOT MET Long Term Goals:  8  weeks MET NOT MET   1). Establish HEP []?  []?  1). Pt independent HEP []?  []?    2). Increase strength 1/3 grade in areas of deficits []?  []?  2). Increase strength to 4+/5 or better []?  []?    3). Improve Tinetti to 19/20 []?  []?  3). Improve Tinetti to 24/28 []?  []?    4). Improve DHI score to 36 or less  []?  []?  4). Improve DHI score to 26 or less []?  []?    5). Pt able to ambulate household distances without AD []?  []?  5). Pt able to do flight of steps with reciprocal pattern with 1 rail  []?  []?    6). []?  []?  6). Pt able to ambulate community distances without AD  []?  []?        Vestibular GOALS    Short Term Goals:   3  weeks MET Not MET Long Term Goals:    6 weeks MET Not Met   Establish HEP []?  []?  Pt independent with HEP []?  []?    Pain  5/10 or less []?  []?  Pain  3/10 or less []?  []?    Increase identified strength deficits 1/3 grade  []?  []?  Increase strength to 4+/5 or greater []?  []?    Increase UE PROM 20 degrees with IR and ER  And 10 degrees with abduction and flexion  []?  []?  Achieve WFL AROM  []?  []?    Report increased tolerance to ADLs without increased pain  []?  []?  Return to all ADLS without limitation []?  []?      []?  []?    []?  []?        Prognosis: [x]Good   []Fair   []Poor    Patient Requires Follow-up:  [x]Yes  []No    Plan: [x]Plan of care initiated     []Continue per plan of care    [] Alter current plan (see comments)    []Hold pending MD visit []Discharge       Timed Code Treatment Minutes:  90  (total with both diagnoses)   Shoulder: 55    Vestibular: 35  Total Treatment Minutes:  90  (total with both diagnoses)     Medicare Cap total YTD:   [x]N/A    Workers Comp Time Stamp  []N/A   Time In: 9212   Time Out: 0689  (Total for both diagnoses)    Electronically signed by:  Ayesha Quispe, CHAPIS, TCF-

## 2020-07-29 ENCOUNTER — HOSPITAL ENCOUNTER (OUTPATIENT)
Dept: PHYSICAL THERAPY | Age: 65
Setting detail: THERAPIES SERIES
Discharge: HOME | End: 2020-07-29
Payer: COMMERCIAL

## 2020-07-29 PROCEDURE — 97110 THERAPEUTIC EXERCISES: CPT

## 2020-07-29 PROCEDURE — 97112 NEUROMUSCULAR REEDUCATION: CPT

## 2020-07-29 PROCEDURE — 97140 MANUAL THERAPY 1/> REGIONS: CPT

## 2020-07-29 NOTE — FLOWSHEET NOTE
Outpatient Physical Therapy     [x] Daily Treatment Note   [] Progress Note   [] Discharge Note    Date:  7/29/2020    Patient Name:  Elin Juarez         YOB: 1955    Medical Diagnosis: unspecified disorder of vestibular system H81.90 ; ORIF clavicle, I S42.022K displaced fx shaft of L clavicle                                                    Treatment Diagnosis:  Imbalance, dizziness/vertigo ;  L shoulder pain, decreased mobility, weakness          Onset Date:    2/14/2019             Referral Date:  7/17/20 (vestibular)  / 7/13/20 (shoulder)              Referring Physician:  Loni Stern (vestibular) / Dr. Mervat Kahn (shoulder)                                                    Visits Allowed/Insurance/Certification Information:  Vestibular [facility], 16 visits approved through 9/25/20  / shoulder [facility], 12 visits approved until 8/21/20     Restrictions/Precautions: HTN, DM, hx of upper cervical fractures, recent neck surgery 6/2020     Plan of care sent to provider Dina Valencia):      [x]Faxed  []Co-signature    (attempts: 1[x] 2 []3[])      Plan of care signed:      [x]Yes date: 7/27/20              Plan of care sent to provider Shabnam Swan):      [x]Faxed  []Co-signature    (attempts: 1[x] 2 []3[])     Plan of care signed:      [x]Yes date:   7/23/20            Progress Note covers period from (if applicable):    [x]NA    []From          To           Next Progress Note due:   8/12/20    Visit# / total visits:  Vestibular 3 /16  Shoulder 3 /12     Plan for Next Session:     Shoulder joint mobilization, PROM, progress ROM and strength   Progress LE strength and flexibility   Progress balance activities   Progress gait and steps as able    Subjective:    Pt states he felt pretty good after last visit  Was out picking tomatoes   No complaints with HEP.      Pain level: No pain currently   AT EVAL:   Patient reports L shoulder pain is  0/10 pain at present and  8/10 pain at its worst.  Pt also reports pain from cervical spine that radiates to L trap and goes to L elbow, pt reports constant numbness ventral fingers L hand and weakness into L hand. Also neck  pain,                                      4/10 pain at present   6/10 pain at its worst      Objective:         Exercises:    Exercises in bold performed in department today. Items not bolded are carried forward from prior visits for continuity of the record. Exercise/Equipment Resistance/Repetitions HEP Other comments       []      L Shoulder Diagnosis:   []       pendulums 20x CW and 20x CCW [x] Reviewed      Assisted flexion supine 10 x 10 sec  [x] Advised to progress into newly obtained range      ER with cane at 90 deg abd 10 x 10 sec [x] Reviewed and corrected. Tolerating well. Sleeper stretch 10 x 10 sec [x] Reviewed and corrected form       Serratus ceilng punch         Mid rows (seated ok for now) Red, 1x10 [x] Reviewed for HEP      Serratus punch  3#, 3x10 [x]       Prone shoulder extension to hip with scap retraction  0#, 3x10 [x]          []         []        []      Vestibular Diagnosis:    []      scifit  Seat 8, with arms Level 2, 10 minutes        Bridges  2x15 [x]      Standing heel raises 2x15 [x]      Standing ABD  1x20 []      Step ups 8\" 2x10 B  [] Single UE     Step ups onto 6\" step, blue foam 2x20 []      Static stance on Blue foam  Bilat UE support, EO, 30 sec x 4  Single UE support, EO, 30 sec x 4  Single UE support (RIGHT), 30 sec x 2  Single UE support (LEFT), 10 sec x 5 *  Head nods , EC, bilat UE support x 20   Head turns, EC, bilat UE support, x 20   No UE support, EO 30 sec x 2  Pt tends to lean posteriorly.      March in place on blue foam  2x20   Single UE    Static Stance on firm surface  Feet apart:  EO x 30 sec  EO head turns x 10  EO head nods x 10  EC x 30 sec x 2         Therapeutic Exercise/Home Exercise Program:   40 minutes (3U)  HEP has been established, See above,  Reviewed and progressed, - increasing to sets of 15 with strength exs. Discussed purchasing some hand weights: 2,3,5 #  PROM to L shoulder    Shoulder ROM after manual tx:    IR 60 @ 90 deg abd, ER 80 @ 90 deg abd, flexion 167, abd 135    Access Code: [email or web address]   URL: [email or web address]   Date: 07/29/2020   Prepared by: Kristen Franco     Exercises   Circular Shoulder Pendulum with Table Support - 20 reps - 2 sets - 2x daily - 7x weekly   Supine Shoulder Flexion AAROM - 10 reps - 10 hold - 2x daily - 7x weekly   Supine Shoulder External Internal Rotation AAROM with Dowel - 10 reps - 10 hold - 2x daily - 7x weekly   Sleeper Stretch - 10 reps - 10 hold - 2x daily - 7x weekly   Standing Shoulder Row with Anchored Resistance - 10-15 reps - 3 sets - 1x daily - 7x weekly   Bridge - 10-15 reps - 3 sets - 1x daily - 7x weekly   Heel rises with counter support - 10-15 reps - 3 sets - 1x daily - 7x weekly   Supine Single Arm Shoulder Protraction - 10-15 reps - 3 sets - 1x daily - 7x weekly   Prone Shoulder Extension - Single Arm - 10-15 reps - 3 sets - 1x daily - 7x weekly       Therapeutic Activity:  0 minutes     Gait: 0 minutes    Neuromuscular Re-Education:  30 minutes (2U)  Static stance blue foam activities per chart above       Canalith Repositioning Procedure:  0 minutes    Manual Therapy:  10  Minutes (1U)  GH dist, inf and post glides L shoulder    Modalities: 0 minutes    Functional Outcome Measure:     Measure Used: Dizziness Handicap Inventory   Score: 46/100  Date Assessed: 7/22/20     Assessment/Treatment/Activity Tolerance:    Patients response to treatment:   [x]Patient tolerated treatment well []Patient limited by fatigue   []Patient limited by pain  []Patient limited by other medical complications   []Other:     Goals:     Shoulder GOALS      Short Term Goals:   4 weeks MET NOT MET Long Term Goals:  8  weeks MET NOT MET   1). Establish HEP []?  []?  1). Pt independent HEP []?  []?    2).   Increase strength 1/3 grade in

## 2020-08-03 ENCOUNTER — HOSPITAL ENCOUNTER (OUTPATIENT)
Dept: PHYSICAL THERAPY | Age: 65
Setting detail: THERAPIES SERIES
Discharge: HOME | End: 2020-08-03
Payer: COMMERCIAL

## 2020-08-03 PROCEDURE — 97112 NEUROMUSCULAR REEDUCATION: CPT

## 2020-08-03 PROCEDURE — 97110 THERAPEUTIC EXERCISES: CPT

## 2020-08-03 PROCEDURE — 97140 MANUAL THERAPY 1/> REGIONS: CPT

## 2020-08-03 NOTE — FLOWSHEET NOTE
Outpatient Physical Therapy     [x] Daily Treatment Note   [] Progress Note   [] Discharge Note    Date:  8/3/2020    Patient Name:  Maria C March         YOB: 1955    Medical Diagnosis: unspecified disorder of vestibular system H81.90 ; ORIF clavicle, I S42.022K displaced fx shaft of L clavicle                                                    Treatment Diagnosis:  Imbalance, dizziness/vertigo ;  L shoulder pain, decreased mobility, weakness          Onset Date:    2/14/2019             Referral Date:  7/17/20 (vestibular)  / 7/13/20 (shoulder)              Referring Physician:  Francena Heimlich (vestibular) / Dr. Juliocesar Guadalupe (shoulder)                                                    Visits Allowed/Insurance/Certification Information:  Vestibular [facility], 16 visits approved through 9/25/20  / shoulder [facility], 12 visits approved until 8/21/20     Restrictions/Precautions: HTN, DM, hx of upper cervical fractures, recent neck surgery 6/2020     Plan of care sent to provider Cammy Buckley):      [x]Faxed  []Co-signature    (attempts: 1[x] 2 []3[])      Plan of care signed:      [x]Yes date: 7/27/20              Plan of care sent to provider Moises Martinez):      [x]Faxed  []Co-signature    (attempts: 1[x] 2 []3[])     Plan of care signed:      [x]Yes date:   7/23/20            Progress Note covers period from (if applicable):    [x]NA    []From          To           Next Progress Note due:   8/12/20    Visit# / total visits:  Vestibular 4/16  Shoulder 4/12     Plan for Next Session:     Shoulder joint mobilization, PROM, progress ROM and strength   Progress LE strength and flexibility   Progress balance activities    Progress gait and steps as able    Subjective:    No new complaints  Helped pick beans from garden   No complaints with HEP. Has not purchased any weights yet. Pain level: No pain currently. L shoulder 4/10 at the worst over weekend.     AT EVAL:   Patient reports L shoulder pain is  0/10 pain at present and  8/10 pain at its worst.  Pt also reports pain from cervical spine that radiates to L trap and goes to L elbow, pt reports constant numbness ventral fingers L hand and weakness into L hand. Also neck  pain,                                      4/10 pain at present   6/10 pain at its worst      Objective:         Exercises:    Exercises in bold performed in department today. Items not bolded are carried forward from prior visits for continuity of the record. Exercise/Equipment Resistance/Repetitions HEP Other comments       []      L Shoulder Diagnosis:   []       pendulums 20x CW and 20x CCW [x] Reviewed      Assisted flexion supine Reviewed, 10 x 10 sec  [x] Advised to progress into newly obtained range      ER with cane at 90 deg abd 10 x 10 sec [x] Reviewed and corrected. Tolerating well. Sleeper stretch 10 x 10 sec [x] Reviewed and corrected form       Serratus ceilng punch         Mid rows (seated ok for now) Red, 1x10 [x] Reviewed for HEP      Serratus punch  3#, 3x12 [x]       Prone shoulder extension to hip with scap retraction  1#, 3x10 [x]        Triceps ext supine 2# 3x10 [x]       SL ER  2# 3X10 [x]        []      Vestibular Diagnosis:    []      scifit  Seat 8, with arms Level 2, 10 minutes        Bridges  2x15 [x]      Standing heel raises 2x15 [x]      Standing ABD  2#, 2x10 B []      Step ups 8\" 2x10 B  [] Single UE     Step ups onto 6\" step, blue foam 2x20 []      Static stance on Blue foam  Bilat UE support, EO, 30 sec x 4  Single UE support, EO, 30 sec x 4  Single UE support (RIGHT), 30 sec x 2  Single UE support (LEFT), 10 sec x 5 *  Head nods , EC, bilat UE support x 20   Head turns, EC, bilat UE support, x 20   No UE support, EO 30 sec x 2  No UE support, EO head turns 10 reps  No UE support, EO head nods 10 reps   Pt tends to lean posteriorly.      March in place on blue foam  2x20   Single R UE    Static Stance on firm surface  Feet apart:  EO x 30 Procedure:  0 minutes    Manual Therapy:  10  Minutes (1U)  GH dist, inf and post glides L shoulder    Modalities: 0 minutes    Functional Outcome Measure:     Measure Used: Dizziness Handicap Inventory   Score: 46/100  Date Assessed: 7/22/20     Assessment/Treatment/Activity Tolerance:    Patients response to treatment:   [x]Patient tolerated treatment well []Patient limited by fatigue   []Patient limited by pain  []Patient limited by other medical complications   []Other:     Goals:     Shoulder GOALS      Short Term Goals:   4 weeks MET NOT MET Long Term Goals:  8  weeks MET NOT MET   1). Establish HEP []?  []?  1). Pt independent HEP []?  []?    2). Increase strength 1/3 grade in areas of deficits []?  []?  2). Increase strength to 4+/5 or better []?  []?    3). Improve Tinetti to 19/20 []?  []?  3). Improve Tinetti to 24/28 []?  []?    4). Improve DHI score to 36 or less  []?  []?  4). Improve DHI score to 26 or less []?  []?    5). Pt able to ambulate household distances without AD []?  []?  5). Pt able to do flight of steps with reciprocal pattern with 1 rail  []?  []?    6). []?  []?  6). Pt able to ambulate community distances without AD  []?  []?        Vestibular GOALS    Short Term Goals:   3  weeks MET Not MET Long Term Goals:    6 weeks MET Not Met   Establish HEP []?  []?  Pt independent with HEP []?  []?    Pain  5/10 or less []?  []?  Pain  3/10 or less []?  []?    Increase identified strength deficits 1/3 grade  []?  []?  Increase strength to 4+/5 or greater []?  []?    Increase UE PROM 20 degrees with IR and ER  And 10 degrees with abduction and flexion  []?  []?  Achieve WFL AROM  []?  []?    Report increased tolerance to ADLs without increased pain  []?  []?  Return to all ADLS without limitation []?  []?      []?  []?    []?  []?        Prognosis: [x]Good   []Fair   []Poor    Patient Requires Follow-up:  [x]Yes  []No    Plan: []Plan of care initiated     [x]Continue per plan of care    [] Alter current plan (see comments)    []Hold pending MD visit []Discharge       Timed Code Treatment Minutes:  78  (total with both diagnoses)     Total Treatment Minutes:  78  (total with both diagnoses)     Medicare Cap total YTD:   [x]N/A    Workers Comp Time Stamp  []N/A   Time In: 1257   Time Out: 215  (Total for both diagnoses)    Electronically signed by:  MARILIA Sawyer, [id number]

## 2020-08-05 ENCOUNTER — HOSPITAL ENCOUNTER (OUTPATIENT)
Dept: PHYSICAL THERAPY | Age: 65
Setting detail: THERAPIES SERIES
Discharge: HOME | End: 2020-08-05
Payer: COMMERCIAL

## 2020-08-05 PROCEDURE — 97110 THERAPEUTIC EXERCISES: CPT

## 2020-08-05 PROCEDURE — 97140 MANUAL THERAPY 1/> REGIONS: CPT

## 2020-08-05 PROCEDURE — 97112 NEUROMUSCULAR REEDUCATION: CPT

## 2020-08-05 NOTE — FLOWSHEET NOTE
Outpatient Physical Therapy     [x] Daily Treatment Note   [] Progress Note   [] Discharge Note    Date:  8/5/2020    Patient Name:  Morena Lucas         YOB: 1955    Medical Diagnosis: unspecified disorder of vestibular system H81.90 ; ORIF clavicle, I S42.022K displaced fx shaft of L clavicle                                                    Treatment Diagnosis:  Imbalance, dizziness/vertigo ;  L shoulder pain, decreased mobility, weakness          Onset Date:    2/14/2019             Referral Date:  7/17/20 (vestibular)  / 7/13/20 (shoulder)              Referring Physician:  Milton Guevara (vestibular) / Dr. Maggi Robles (shoulder)                                                    Visits Allowed/Insurance/Certification Information:  Vestibular [facility], 16 visits approved through 9/25/20  / shoulder [facility], 12 visits approved until 8/21/20     Restrictions/Precautions: HTN, DM, hx of upper cervical fractures, recent neck surgery 6/2020     Plan of care sent to provider Janet Lamb):      [x]Faxed  []Co-signature    (attempts: 1[x] 2 []3[])      Plan of care signed:      [x]Yes date: 7/27/20              Plan of care sent to provider Jude Loja):      [x]Faxed  []Co-signature    (attempts: 1[x] 2 []3[])     Plan of care signed:      [x]Yes date:   7/23/20            Progress Note covers period from (if applicable):    [x]NA    []From          To           Next Progress Note due:   8/12/20    Visit# / total visits:  Vestibular 5/16  Shoulder 5/12     Plan for Next Session:     Shoulder joint mobilization, PROM, progress ROM and strength   Progress LE strength and flexibility   Progress balance activities    Progress eye exercises   Progress habituation exs as needed   Progress gait and steps as able    Subjective:    Pt's  is present today for first 10 minutes of treatment  Discussed cervical treatment, all agreeable to waiting until finished with shoulder therapy and see if neck surgeon wants to do PT for neck. No new complaints  Pt has purchased 2 and 3# hand weights. Pt states his dizziness is intermittent, provoking positions include standing or sitting up too quickly, rolling both ways, patterns on carpet/walls/busy environments,       Pain level: No pain currently. L shoulder 4/10 at the worst over weekend. AT EVAL:   Patient reports L shoulder pain is  0/10 pain at present and  8/10 pain at its worst.  Pt also reports pain from cervical spine that radiates to L trap and goes to L elbow, pt reports constant numbness ventral fingers L hand and weakness into L hand. Also neck  pain,                                      4/10 pain at present   6/10 pain at its worst      Objective:         Exercises:    Exercises in bold performed in department today. Items not bolded are carried forward from prior visits for continuity of the record. Exercise/Equipment Resistance/Repetitions HEP Other comments       []      L Shoulder Diagnosis:   []       pendulums 20x CW and 20x CCW [x] Reviewed      Assisted flexion supine Reviewed, 10 x 10 sec  [x] Advised to progress into newly obtained range      ER with cane at 90 deg abd 10 x 10 sec [x] Reviewed and corrected. Tolerating well. Sleeper stretch 10 x 10 sec [x] Reviewed and corrected form       Serratus ceilng punch         Mid rows (seated ok for now) Red, 1x10 [x] Reviewed for HEP      Serratus punch  3#, 3x15 [x]       Triceps ext supine 2# 3x12        Prone shoulder extension to hip with scap retraction  2#, 3x10 [x]      Prone row  2#, 3x10  [x]        Prone horizontal abduction   When able [x]       SL ER  2# 3X12 [x]       Shoulder IR with tband NV               []      Vestibular Diagnosis:    []      Eye Exercises:         Saccades, seated  Pt inst to do this exercise x 1 minute in horizontal direction and x 1 minute in vertical direction.    [x]        Corrective saccades, seated Pt inst to do this exercise x 2 minute in horizontal direction and x 2 minute in vertical direction  [x]        VOR x 1 viewing  NV             Habituation Exercises:         Rolling B  5 repetitions (Rolling R, center, rolling L, center)   Pt to do 2 times per day  [x]                                scifit  Seat 8, with arms Level 2, 10 minutes        Bridges  2x15 [x]      Standing heel raises 2x15 [x]      Standing ABD  2#, 2x10 B []      Step ups 8\" 2x10 B  [] Single UE     Step ups onto 6\" step, blue foam 2x20 []      Static stance on Blue foam  Bilat UE support, EO, 30 sec x 4  Single UE support, EO, 30 sec x 4  Single UE support (RIGHT), 30 sec x 2  Single UE support (LEFT), 10 sec x 5 *  Head nods , EC, bilat UE support x 20   Head turns, EC, bilat UE support, x 20   No UE support, EO 30 sec x 2  No UE support, EO head turns 10 reps  No UE support, EO head nods 10 reps   Pt tends to lean posteriorly. March in place on blue foam  2x20   Single R UE    Static Stance on firm surface  Feet apart:  EO x 30 sec  EO head turns x 10  EO head nods x 10  EC x 30 sec x 2       Step up and over, 6\" step 1x20 alternating pattern   Single UE for balance   Lateral eccentric step down, 4\" step 2x10 B  Bilateral UE for balance   Lateral cone step over at // bars 2x  Bilateral UE   Step over cone, forward @ // bars  3x B  B UE, Single UE, No UE     Therapeutic Exercise/Home Exercise Program:   35 minutes (2U)  HEP has been established, See above,  Reviewed and progressed, - increasing to sets of 15 with strength exs. PROM to L shoulder    Shoulder ROM after manual tx:    IR 70 @ 90 deg abd, ER 70 @ 90 deg abd, flexion 165, abd 135    Access Code: [email or web address]   URL: [email or web address]   Date: 08/05/2020   Prepared by: Sarita Oh     Exercises   Circular Shoulder Pendulum with Table Support - 20 reps - 2 sets - 2x daily - 7x weekly   Supine Shoulder Flexion AAROM - 10 reps - 10 hold - 2x daily - 7x weekly   Supine Shoulder External Internal Rotation AAROM with Dowel - 10 reps - 10 hold - 2x daily - 7x weekly   Sleeper Stretch - 10 reps - 10 hold - 2x daily - 7x weekly   Standing Shoulder Row with Anchored Resistance - 10-15 reps - 3 sets - 1x daily - 7x weekly   Bridge - 10-15 reps - 3 sets - 1x daily - 7x weekly   Heel rises with counter support - 10-15 reps - 3 sets - 1x daily - 7x weekly   Supine Single Arm Shoulder Protraction - 10-15 reps - 3 sets - 1x daily - 7x weekly   Prone Shoulder Extension - Single Arm - 10-15 reps - 3 sets - 1x daily - 7x weekly   Supine Triceps Extension with Resistance - 10 reps - 3 sets - 1x daily - 7x weekly   Supine Elbow Flexion Extension with Dumbbell - 10-15 reps - 3 sets - 1x daily - 7x weekly   Sidelying Shoulder ER with Towel and Dumbbell - 10-15 reps - 3 sets - 1x daily - 7x weekly   Prone Shoulder Row - 10-15 reps - 3 sets - 1x daily - 7x weekly         Therapeutic Activity:  0 minutes     Gait: 0 minutes    Neuromuscular Re-Education:  42 minutes (3U)  Assessed orthostatic BPs:  Supine 127/76 pulse 100  Seated 115/73 pulse 103  Standing 127/73 pulse 108    Initiated Eye exercises, see above  Initiated habituation exercises, see above           Canalith Repositioning Procedure:  0 minutes    Manual Therapy:  10  Minutes (1U)  GH dist, inf and post glides L shoulder    Modalities: 0 minutes    Functional Outcome Measure:     Measure Used: Dizziness Handicap Inventory   Score: 46/100  Date Assessed: 7/22/20     Assessment/Treatment/Activity Tolerance:    Patients response to treatment:   [x]Patient tolerated treatment well []Patient limited by fatigue   []Patient limited by pain  []Patient limited by other medical complications   []Other:     Goals:     Shoulder GOALS      Short Term Goals:   4 weeks MET NOT MET Long Term Goals:  8  weeks MET NOT MET   1). Establish HEP []?  []?  1). Pt independent HEP []?  []?    2). Increase strength 1/3 grade in areas of deficits []?  []?  2).   Increase strength to 4+/5 or better []?  []?    3). Improve Tinetti to 19/20 []?  []?  3). Improve Tinetti to 24/28 []?  []?    4). Improve DHI score to 36 or less  []?  []?  4). Improve DHI score to 26 or less []?  []?    5). Pt able to ambulate household distances without AD []?  []?  5). Pt able to do flight of steps with reciprocal pattern with 1 rail  []?  []?    6). []?  []?  6). Pt able to ambulate community distances without AD  []?  []?        Vestibular GOALS    Short Term Goals:   3  weeks MET Not MET Long Term Goals:    6 weeks MET Not Met   Establish HEP []?  []?  Pt independent with HEP []?  []?    Pain  5/10 or less []?  []?  Pain  3/10 or less []?  []?    Increase identified strength deficits 1/3 grade  []?  []?  Increase strength to 4+/5 or greater []?  []?    Increase UE PROM 20 degrees with IR and ER  And 10 degrees with abduction and flexion  []?  []?  Achieve WFL AROM  []?  []?    Report increased tolerance to ADLs without increased pain  []?  []?  Return to all ADLS without limitation []?  []?      []?  []?    []?  []?        Prognosis: [x]Good   []Fair   []Poor    Patient Requires Follow-up:  [x]Yes  []No    Plan: []Plan of care initiated     [x]Continue per plan of care    [] Alter current plan (see comments)    []Hold pending MD visit []Discharge       Timed Code Treatment Minutes:  87  (total with both diagnoses)     Total Treatment Minutes:  87  (total with both diagnoses)     Medicare Cap total YTD:   [x]N/A    Workers Comp Time Stamp  []N/A   Time In: 5896   Time Out: 215  (Total for both diagnoses)    Electronically signed by:  MARILIA Solis, [id number]

## 2020-08-10 ENCOUNTER — HOSPITAL ENCOUNTER (OUTPATIENT)
Dept: PHYSICAL THERAPY | Age: 65
Setting detail: THERAPIES SERIES
Discharge: HOME | End: 2020-08-10
Payer: COMMERCIAL

## 2020-08-10 PROCEDURE — 97140 MANUAL THERAPY 1/> REGIONS: CPT

## 2020-08-10 PROCEDURE — 97110 THERAPEUTIC EXERCISES: CPT

## 2020-08-10 PROCEDURE — 97112 NEUROMUSCULAR REEDUCATION: CPT

## 2020-08-10 NOTE — FLOWSHEET NOTE
Outpatient Physical Therapy     [x] Daily Treatment Note   [] Progress Note   [] Discharge Note    Date:  8/10/2020    Patient Name:  Rocky Magallon         YOB: 1955    Medical Diagnosis: unspecified disorder of vestibular system H81.90 ; ORIF clavicle, I S42.022K displaced fx shaft of L clavicle                                                    Treatment Diagnosis:  Imbalance, dizziness/vertigo ;  L shoulder pain, decreased mobility, weakness          Onset Date:    2/14/2019             Referral Date:  7/17/20 (vestibular)  / 7/13/20 (shoulder)              Referring Physician:  Celeste Beth (vestibular) / Dr. Tony Mohamud (shoulder)                                                    Visits Allowed/Insurance/Certification Information:  Vestibular [facility], 16 visits approved through 9/25/20  / shoulder [facility], 12 visits approved until 8/21/20     Restrictions/Precautions: HTN, DM, hx of upper cervical fractures, recent neck surgery 6/2020     Plan of care sent to provider Toni Kimble):      [x]Faxed  []Co-signature    (attempts: 1[x] 2 []3[])      Plan of care signed:      [x]Yes date: 7/27/20              Plan of care sent to provider Yana Mckeon):      [x]Faxed  []Co-signature    (attempts: 1[x] 2 []3[])     Plan of care signed:      [x]Yes date:   7/23/20            Progress Note covers period from (if applicable):    [x]NA    []From          To           Next Progress Note due:   Next visit 8/12/20    Visit# / total visits:  Vestibular 6/16  Shoulder 6/12     Plan for Next Session:     Shoulder joint mobilization, PROM, progress ROM and strength   Progress LE strength and flexibility   Progress balance activities    Progress eye exercises   Progress habituation exs as needed   Progress gait and steps as able    Subjective:    Pt states he is doing eye exs 2x/day, no complaints   States he hasn't noticed any change with rolling ex. Still makes him dizzy.    States progressing with shoulder exs, has 2 and 5# dumbbells at home      Pain level: No pain currently. L shoulder 2/10 at the worst over weekend. AT EVAL:   Patient reports L shoulder pain is  0/10 pain at present and  8/10 pain at its worst.  Pt also reports pain from cervical spine that radiates to L trap and goes to L elbow, pt reports constant numbness ventral fingers L hand and weakness into L hand. Also neck  pain,                                      4/10 pain at present   6/10 pain at its worst      Objective:         Exercises:    Exercises in bold performed in department today. Items not bolded are carried forward from prior visits for continuity of the record. Exercise/Equipment Resistance/Repetitions HEP Other comments       []      L Shoulder Diagnosis:   []       pendulums 20x CW and 20x CCW [x] Reviewed      Assisted flexion supine Reviewed, 10 x 10 sec  [x] Advised to progress into newly obtained range      ER with cane at 90 deg abd 10 x 10 sec [x] Reviewed and corrected. Tolerating well. Sleeper stretch 10 x 10 sec [x] Reviewed and corrected form       Serratus ceilng punch         Mid rows (seated ok for now) Red, 1x10 [x] Reviewed for HEP      Serratus punch  5#, 3x10 [x]       Triceps ext supine 5# 3x10        Prone shoulder extension to hip with scap retraction  2#, 3x10 [x]      Prone row  2#, 3x12  [x]        Prone horizontal abduction   0#, 1X10 DID NOT GIVE FOR HOME YET, WILL ADD NEXT VISIT []       SL ER  2# 3X15 [x]       Shoulder IR with tband Green, 3x10  [x]                []      Vestibular Diagnosis:    []      Eye Exercises:         Saccades, seated  Pt inst to do this exercise x 1 minute in horizontal direction and x 1 minute in vertical direction.    [x]   Reviewed, continue     Corrective saccades, seated Pt inst to do this exercise x 2 minute in horizontal direction and x 2 minute in vertical direction  [x]   Reviewed, continue     VOR x 1 viewing  NV             Habituation Exercises: Rolling B  5 repetitions (Rolling R, center, rolling L, center)   Pt to do 2 times per day  [x]       supine to longsit and vice versa  5 repetitions (sit up, lie back down)   Pt to do 2 times per day                         scifit  Seat 8, with arms Level 2.5, 10 minutes   Subjective info taken during ex x 5 min      Bridges  2x15 [x]      Standing heel raises 2x15 [x]      Standing ABD  2#, 2x10 B []      Step ups 8\" 2x10 B  [] Single UE     Step ups onto 6\" step, blue foam 2x20 []      Static stance on Blue foam  Bilat UE support, EO, 30 sec x 4  Single UE support, EO, 30 sec x 4  Single UE support (RIGHT), 30 sec x 2  Single UE support (LEFT), 10 sec x 5 *  Head nods , EC, bilat UE support x 20   Head turns, EC, bilat UE support, x 20   No UE support, EO 30 sec x 2  No UE support, EO head turns 10 reps  No UE support, EO head nods 10 reps   Pt tends to lean posteriorly. March in place on blue foam  2x20   Single R UE    Static Stance on firm surface  Feet apart:  EO x 30 sec  EO head turns x 10  EO head nods x 10  EC x 30 sec x 2       Step up and over, 6\" step 1x20 alternating pattern   Single UE for balance   Lateral eccentric step down, 4\" step 2x10 B  Bilateral UE for balance   Lateral cone step over at // bars 2x  Bilateral UE   Step over cone, forward @ // bars  3x B  B UE, Single UE, No UE     Therapeutic Exercise/Home Exercise Program:   35 minutes (2U)  HEP has been established, See above,  Reviewed and progressed, - increasing to sets of 15 with strength exs. PROM to L shoulder    Shoulder ROM after manual tx:    IR 70 @ 90 deg abd, ER 76 @ 90 deg abd, flexion 165, abd 135    Access Code: [email or web address]   URL: [email or web address]   Date: 08/10/2020   Prepared by: Galdino Escalera     Exercises   Circular Shoulder Pendulum with Table Support - 20 reps - 2 sets - 2x daily - 7x weekly   Supine Shoulder Flexion AAROM - 10 reps - 10 hold - 2x daily - 7x weekly   Supine Shoulder External Internal

## 2020-08-12 ENCOUNTER — TELEPHONE (OUTPATIENT)
Dept: INTERNAL MEDICINE CLINIC | Age: 65
End: 2020-08-12

## 2020-08-12 ENCOUNTER — HOSPITAL ENCOUNTER (OUTPATIENT)
Dept: PHYSICAL THERAPY | Age: 65
Setting detail: THERAPIES SERIES
Discharge: HOME | End: 2020-08-12
Payer: COMMERCIAL

## 2020-08-12 PROCEDURE — 97112 NEUROMUSCULAR REEDUCATION: CPT

## 2020-08-12 PROCEDURE — 97116 GAIT TRAINING THERAPY: CPT

## 2020-08-12 PROCEDURE — 97140 MANUAL THERAPY 1/> REGIONS: CPT

## 2020-08-12 PROCEDURE — 97110 THERAPEUTIC EXERCISES: CPT

## 2020-08-12 NOTE — TELEPHONE ENCOUNTER
Refill request for METOPROLOL medication.      Name of Pharmacy- [facility]      Last visit - 4/27/20     Pending visit - NONE    Last refill -7/8/19      Medication Contract signed -   Last Oarrs ran-         Additional Comments

## 2020-08-12 NOTE — PROGRESS NOTES
worse than when starting PT (because DHI score is worse than at eval), pt states maybe at the start of PT, he wasn't really sure if the answer should be \"always\" or \"sometimes\" with scoring DHI  Pt states he has been able to tolerate increased ADLs at home without increased pain to the L shoulder. States doing HEP, no complaints  Pt states he is frustrated with his problems and wonders if it will ever improve. Pain level: No pain currently. L shoulder 2/10 at the worst in the past week   AT EVAL:   Patient reports L shoulder pain is  0/10 pain at present and  8/10 pain at its worst.  Pt also reports pain from cervical spine that radiates to L trap and goes to L elbow, pt reports constant numbness ventral fingers L hand and weakness into L hand. Also neck  pain,                                      4/10 pain at present   6/10 pain at its worst      Objective:         Exercises:    Exercises in bold performed in department today. Items not bolded are carried forward from prior visits for continuity of the record. Exercise/Equipment Resistance/Repetitions HEP Other comments       []      L Shoulder Diagnosis:   []       pendulums 20x CW and 20x CCW [x] Reviewed      Assisted flexion supine Reviewed, 10 x 10 sec  [x] Advised to progress into newly obtained range      ER with cane at 90 deg abd 10 x 10 sec [x] Reviewed and corrected. Tolerating well.       Sleeper stretch 10 x 10 sec [x] Reviewed and corrected form       Serratus ceilng punch         Mid rows (seated ok for now) Red, 1x10 [x] Reviewed for HEP      Serratus punch  5#, 5x12 (pt did more than asked) [x]       Triceps ext supine 5# 3x12-15        Prone shoulder extension to hip with scap retraction  2#, 3x12 [x]      Prone row  3#, 3x12  [x]        Prone horizontal abduction  With scap retraction 1#, 3X10  [x]       SL ER  3# 2W05 [x]       Shoulder IR with tband Green, 3x12  [x]        Wall push ups  NV         []      Vestibular Diagnosis: []      Eye Exercises:         Saccades, seated  Pt inst to do this exercise x 1 minute in horizontal direction and x 1 minute in vertical direction. [x]   Reviewed, continue     Corrective saccades, seated Pt inst to do this exercise x 2 minute in horizontal direction and x 2 minute in vertical direction  [x]   Reviewed, continue     VOR x 1 viewing  NV             Habituation Exercises:         Rolling B  5 repetitions (Rolling R, center, rolling L, center)   Pt to do 2 times per day  [x]   Reviewed, continue    supine to longsit and vice versa  5 repetitions (sit up, lie back down)   Pt to do 2 times per day  Reviewed, continue                       scifit  Seat 8, with arms Level 3.0, 10 minutes   Subjective info taken during ex x 5 min      Bridges  2x15 [x]      Standing heel raises 2x15 [x]      Standing ABD  2#, 2x10 B []      Step ups 8\" 2x10 B  [] Single UE     Step ups onto 6\" step, blue foam 2x20 []      Static stance on Blue foam  Bilat UE support, EO, 30 sec x 4  Single UE support, EO, 30 sec x 4  Single UE support (RIGHT), 30 sec x 2  Single UE support (LEFT), 10 sec x 5 *  Head nods , EC, bilat UE support x 20   Head turns, EC, bilat UE support, x 20   No UE support, EO 30 sec x 2  No UE support, EO head turns 10 reps  No UE support, EO head nods 10 reps   Pt tends to lean posteriorly.      March in place on blue foam  2x20   Single R UE    Static Stance on firm surface  Feet apart:  EO x 30 sec  EO head turns x 10  EO head nods x 10  EC x 30 sec x 2       Step up and over, 6\" step 1x20 alternating pattern   Single UE for balance   Lateral eccentric step down, 4\" step 2x10 B  Bilateral UE for balance   Lateral cone step over at // bars 2x  Bilateral UE   Step over cone, forward @ // bars  3x B  B UE, Single UE, No UE     Therapeutic Exercise/Home Exercise Program:   45 minutes (3U)  HEP has been established, See above,  Reviewed and progressed  Reassessed for progress note:   LE strength:   B hip abd 4+ B   B hip extension  B 5/5  gastrocs in WB  4/5 B    UE Range of Motion/Strength Testing      [x]? All strength WFL (5/5) except as marked below                Range Tested MMT / Resisted PROM AROM Comments   *denotes pain Left Right Left Right Left Right     Shoulder Flexion 4   170 170 130 150     Shoulder Extension 5       65 65     Shoulder Abduction  C5 4+   135 126 115 160     Shoulder Adduction  5               Shoulder IR 5   70 @ 90 27 @ 90 T10 T12     Shoulder ER 5   80 @ 90 90 @ 90 lateral head T4     Elbow Flexion  C6 5               Elbow Extension C7 5                    Access Code: [email or web address]   URL: [email or web address]   Date: 08/10/2020   Prepared by: Zackary Fabian     Exercises   Circular Shoulder Pendulum with Table Support - 20 reps - 2 sets - 2x daily - 7x weekly   Supine Shoulder Flexion AAROM - 10 reps - 10 hold - 2x daily - 7x weekly   Supine Shoulder External Internal Rotation AAROM with Dowel - 10 reps - 10 hold - 2x daily - 7x weekly   Sleeper Stretch - 10 reps - 10 hold - 2x daily - 7x weekly   Standing Shoulder Row with Anchored Resistance - 10-15 reps - 3 sets - 1x daily - 7x weekly   Bridge - 10-15 reps - 3 sets - 1x daily - 7x weekly   Heel rises with counter support - 10-15 reps - 3 sets - 1x daily - 7x weekly   Supine Single Arm Shoulder Protraction - 10-15 reps - 3 sets - 1x daily - 7x weekly   Prone Shoulder Extension - Single Arm - 10-15 reps - 3 sets - 1x daily - 7x weekly   Supine Triceps Extension with Resistance - 10 reps - 3 sets - 1x daily - 7x weekly   Supine Elbow Flexion Extension with Dumbbell - 10-15 reps - 3 sets - 1x daily - 7x weekly   Sidelying Shoulder ER with Towel and Dumbbell - 10-15 reps - 3 sets - 1x daily - 7x weekly   Prone Shoulder Row - 10-15 reps - 3 sets - 1x daily - 7x weekly   Shoulder Internal Rotation with Resistance - 10-15 reps - 3 sets - 1x daily - 7x weekly       Therapeutic Activity:  0 minutes     Gait: 15 minutes (1U)  Gait with SPC, slow vinita but good step height and length, heels almost touching, dec rotation of head  Gait without AD: slow vinita, min decrease in step length intermittently, 1 episode of deviated path. Stairs: pt able to do 4 steps in dept with reciprocal pattern with 1 rail. Pt tends to go down slightly sideways with reciprocal pattern with 1 rail (pt states he has done it this way all his life)  which appears to require greater balance because pt needs to crossover other foot to reach next step. Practiced descent with reciprocal pattern with forward facing feet with 1 rail and educated pt that this is actually more safe. Neuromuscular Re-Education:  20 minutes (1U)  Performed DHI   Performed Tinetii:   Balance Score: 10  Gait Score: 9  Tinetti Total Score: 19    Continue Eye exercises, see above  Continue habituation exercises, see above        Canalith Repositioning Procedure:  0 minutes    Manual Therapy:  10  Minutes (1U)  GH dist, inf and post glides L shoulder    Modalities: 0 minutes    Functional Outcome Measure:     Measure Used: Dizziness Handicap Inventory   Score: 46/100  Date Assessed: 7/22/20     Measure Used: Dizziness Handicap Inventory   Score: 60/100  Date Assessed: 8/12/20    Assessment/Treatment/Activity Tolerance:  Pt making good progress with shoulder diagnosis. Pain levels have decreased and ROM/strength improving. Pt doing well with progressing exercises. Progress with vestibular diagnosis is mixed, pt has improved Tinetti score from 15/28 to 19/28, however, pt's subjective DHI score has worsened from 46/100 to 60/100. Pt does not necessarily verbalize that his dizziness is worse, but states it might be that he incorrectly answered the [address] at the al.  LE strength has improved and pt appears to have decreased undershooting with saccades. Pt will benefit from continuing PT to address remaining goals and maximize function.      Patients response to treatment:   [x]Patient tolerated total YTD:   [x]N/A    Workers Comp Time Stamp  []N/A   Time In: 215   Time Out: 350  (Total for both diagnoses)    Electronically signed by:  MARILIA Puentes, [id number]

## 2020-08-17 ENCOUNTER — HOSPITAL ENCOUNTER (OUTPATIENT)
Dept: PHYSICAL THERAPY | Age: 65
Setting detail: THERAPIES SERIES
Discharge: HOME | End: 2020-08-17
Payer: COMMERCIAL

## 2020-08-17 PROCEDURE — 97110 THERAPEUTIC EXERCISES: CPT

## 2020-08-17 PROCEDURE — 97140 MANUAL THERAPY 1/> REGIONS: CPT

## 2020-08-17 PROCEDURE — 97112 NEUROMUSCULAR REEDUCATION: CPT

## 2020-08-17 NOTE — FLOWSHEET NOTE
Outpatient Physical Therapy     [x] Daily Treatment Note   [] Progress Note   [] Discharge Note    Date:  8/17/2020    Patient Name:  Derek Felix         YOB: 1955    Medical Diagnosis: unspecified disorder of vestibular system H81.90 ; ORIF clavicle, I S42.022K displaced fx shaft of L clavicle                                                    Treatment Diagnosis:  Imbalance, dizziness/vertigo ;  L shoulder pain, decreased mobility, weakness          Onset Date:    2/14/2019             Referral Date:  7/17/20 (vestibular)  / 7/13/20 (shoulder)              Referring Physician:  Lori Giordano (vestibular) / Dr. Kwan Riddle (shoulder)                                                    Visits Allowed/Insurance/Certification Information:  Vestibular [facility], 16 visits approved through 9/25/20  / shoulder [facility], 12 visits approved until 8/21/20     Restrictions/Precautions: HTN, DM, hx of upper cervical fractures, recent neck surgery 6/2020     Plan of care sent to provider Asher Quinn):      [x]Faxed  []Co-signature    (attempts: 1[x] 2 []3[])      Plan of care signed:      [x]Yes date: 7/27/20              Plan of care sent to provider Elliot Tovar):      [x]Faxed  []Co-signature    (attempts: 1[x] 2 []3[])     Plan of care signed:      [x]Yes date:   7/23/20            Progress Note covers period from (if applicable):    []NA    [x]From 7/22/20 To 8/12/20           Next Progress Note due:   9/2/20    Visit# / total visits:  Vestibular 8/16  Shoulder 8/12     Plan for Next Session:     Shoulder joint mobilization, PROM, progress ROM and strength saida scapula   Progress LE strength and flexibility   Progress balance activities    Progress eye exercises   Progress habituation exs as needed   Progress gait and steps as able   Assess gaze stability and initiate VOR ex if needed     Subjective:   Pt states he is not sure why his shoulders are sore today.   States doing exs at home over weekend, states doing exs 2x/day  Pt states he saw Dr. Rosemarie Gilliam this morning and received new script for his neck. Pt states MD said to work on  strength. Pt states rolling is less dizzy, but not gone, states minimal dizziness. Still having dizziness with sit to supine and vice versa. Pain level: Complains of B shoulder soreness 3/10, just woke up that way today. Over weekend,  L shoulder 3/10 at the worst in the past week   AT EVAL:   Patient reports L shoulder pain is  0/10 pain at present and  8/10 pain at its worst.  Pt also reports pain from cervical spine that radiates to L trap and goes to L elbow, pt reports constant numbness ventral fingers L hand and weakness into L hand. Also neck  pain,                                      4/10 pain at present   6/10 pain at its worst      Objective:         Exercises:    Exercises in bold performed in department today. Items not bolded are carried forward from prior visits for continuity of the record. Exercise/Equipment Resistance/Repetitions HEP Other comments       []      L Shoulder Diagnosis:   []       pendulums 20x CW and 20x CCW [x] Reviewed      Assisted flexion supine Reviewed, 10 x 10 sec  [x] Advised to progress into newly obtained range      ER with cane at 90 deg abd 10 x 10 sec [x] Reviewed and corrected. Tolerating well.       Sleeper stretch 10 x 10 sec [x] Reviewed and corrected form       Serratus ceilng punch         Mid rows (seated ok for now) Red, 1x10 [x] Reviewed for HEP      Serratus punch  5#, 5x12 (pt did more than asked) [x]       Triceps ext supine 5# 3x12-15        Prone shoulder extension to hip with scap retraction  2#, 3x12 [x]      Prone row  3#, 3x12  [x]        Prone horizontal abduction  With scap retraction 2#, 3X10  [x]       SL ER  3# [id number] [x]       Shoulder IR with tband Green, 3x12  [x]        Wall push ups  3x10  [x]        Rhythmic stabilization 3x30 sec, distal perturbations []      PNF D1 and D2 UE patterns in supine 2x10 each with min resistance                   Vestibular Diagnosis:    []      Eye Exercises:         Saccades, seated  Pt inst to do this exercise x 1 minute in horizontal direction and x 1 minute in vertical direction. [x]   Reviewed, continue     Corrective saccades, seated Pt inst to do this exercise x 2 minute in horizontal direction and x 2 minute in vertical direction  [x]   Reviewed, continue     VOR x 1 viewing  NV             Habituation Exercises:         Rolling B  5 repetitions (Rolling R, center, rolling L, center)   Pt to do 2 times per day  [x]   Reviewed, continue    supine to longsit and vice versa  5 repetitions (sit up, lie back down)   Pt to do 2 times per day  Reviewed, continue                       scifit  Seat 8, with arms Level 3.0, 10 minutes   Subjective info taken during ex x 5 min      Bridges  2x15 [x]      Standing heel raises 2x15 [x]      Standing ABD  2#, 2x10 B []      Step ups 8\" 2x10 B  [] Single UE     Step ups onto 6\" step, blue foam 2x20 []      Static stance on Blue foam  Conditions   Pt tends to lean posteriorly. March in place on blue foam  2x20   Single R UE    Static Stance on firm surface  Feet apart: conditions 1-6  Feet together: conditions 1-6       Step up and over, 8\" step  Lateral step up and over 6\" step 1x10 B UE; 1x10 L UE alternating pattern  1x10 BUE; 1x10 RUE    Single UE for balance   Lateral eccentric step down, 4\" step 2x10 B  Bilateral UE for balance   Lateral cone step over at // bars 2x  Bilateral UE   Step over cone, forward @ // bars  3x B  B UE, Single UE, No UE                       Therapeutic Exercise/Home Exercise Program:   37 minutes (2U)  HEP has been established, See above,  Reviewed and progressed  PROM to L shoulder  PROM: IR 70 @ 90 deg abd, ER 80 @ 90 deg abd, flex 170, abd 130  Pt inst to do UE and LE strength and ROM exs ONLY 1 TIME PER DAY.    Discussed with pt that we will start therapy on cervical fusion diagnosis after completing shoulder therapy. Access Code: [email or web address]   URL: [email or web address] com/   Date: 08/17/2020   Prepared by: Sarita Oh     Exercises   Circular Shoulder Pendulum with Table Support - 20 reps - 2 sets - 2x daily - 7x weekly   Supine Shoulder Flexion AAROM - 10 reps - 10 hold - 2x daily - 7x weekly   Supine Shoulder External Internal Rotation AAROM with Dowel - 10 reps - 10 hold - 2x daily - 7x weekly   Sleeper Stretch - 10 reps - 10 hold - 2x daily - 7x weekly   Standing Shoulder Row with Anchored Resistance - 10-15 reps - 3 sets - 1x daily - 7x weekly   Bridge - 10-15 reps - 3 sets - 1x daily - 7x weekly   Heel rises with counter support - 10-15 reps - 3 sets - 1x daily - 7x weekly   Supine Single Arm Shoulder Protraction - 10-15 reps - 3 sets - 1x daily - 7x weekly   Prone Shoulder Extension - Single Arm - 10-15 reps - 3 sets - 1x daily - 7x weekly   Supine Triceps Extension with Resistance - 10 reps - 3 sets - 1x daily - 7x weekly   Supine Elbow Flexion Extension with Dumbbell - 10-15 reps - 3 sets - 1x daily - 7x weekly   Sidelying Shoulder ER with Towel and Dumbbell - 10-15 reps - 3 sets - 1x daily - 7x weekly   Prone Shoulder Row - 10-15 reps - 3 sets - 1x daily - 7x weekly   Shoulder Internal Rotation with Resistance - 10-15 reps - 3 sets - 1x daily - 7x weekly   Prone Single Arm Shoulder Horizontal Abduction with Dumbbell - Palm Down - 10-15 reps - 3 sets - 1x daily - 7x weekly   Wall Push Up - 10-15 reps - 3 sets - 1x daily - 7x weekly         Therapeutic Activity:  0 minutes     Gait: 0 minutes     Neuromuscular Re-Education:  40 minutes (3U)  Continue Eye exercises, see above  Continue habituation exercises, see above        Canalith Repositioning Procedure:  0 minutes    Manual Therapy:  10  Minutes (1U)  GH dist, inf and post glides L shoulder    Modalities: 0 minutes    Functional Outcome Measure:     Measure Used: Dizziness Handicap Inventory   Score: 46/100  Date Assessed: diagnoses)     Total Treatment Minutes:  87 + nustep  (total with both diagnoses)     Medicare Cap total YTD:   [x]N/A    Workers Comp Time Stamp  []N/A   Time In: 215   Time Out: 412  (Total for both diagnoses)    Electronically signed by:  MARILIA Santillan, [id number]

## 2020-08-19 ENCOUNTER — HOSPITAL ENCOUNTER (OUTPATIENT)
Dept: PHYSICAL THERAPY | Age: 65
Setting detail: THERAPIES SERIES
Discharge: HOME | End: 2020-08-19
Payer: COMMERCIAL

## 2020-08-19 PROCEDURE — 97112 NEUROMUSCULAR REEDUCATION: CPT

## 2020-08-19 PROCEDURE — 97140 MANUAL THERAPY 1/> REGIONS: CPT

## 2020-08-19 PROCEDURE — 97110 THERAPEUTIC EXERCISES: CPT

## 2020-08-19 NOTE — FLOWSHEET NOTE
Outpatient Physical Therapy     [x] Daily Treatment Note   [] Progress Note   [] Discharge Note    Date:  8/19/2020    Patient Name:  Lina Del Castillo         YOB: 1955    Medical Diagnosis: unspecified disorder of vestibular system H81.90 ; ORIF clavicle, I S42.022K displaced fx shaft of L clavicle                                                    Treatment Diagnosis:  Imbalance, dizziness/vertigo ;  L shoulder pain, decreased mobility, weakness          Onset Date:    2/14/2019             Referral Date:  7/17/20 (vestibular)  / 7/13/20 (shoulder)              Referring Physician:  Tk Pike (vestibular) / Dr. Teddy Stevens (shoulder)                                                    Visits Allowed/Insurance/Certification Information:  Vestibular [facility], 16 visits approved through 9/25/20  / shoulder [facility], 12 visits approved until 8/21/20     Restrictions/Precautions: HTN, DM, hx of upper cervical fractures, recent neck surgery 6/2020     Plan of care sent to provider Chris Alonso):      [x]Faxed  []Co-signature    (attempts: 1[x] 2 []3[])      Plan of care signed:      [x]Yes date: 7/27/20              Plan of care sent to provider Maurice Hammer):      [x]Faxed  []Co-signature    (attempts: 1[x] 2 []3[])     Plan of care signed:      [x]Yes date:   7/23/20            Progress Note covers period from (if applicable):    [x]NA    []From 8/12/20           Next Progress Note due:   9/2/20    Visit# / total visits:  Vestibular 9/16  Shoulder 9/12     Plan for Next Session:     Shoulder joint mobilization, PROM, progress ROM and strength saida scapula   Progress LE strength and flexibility   Progress balance activities    Progress eye exercises   Progress habituation exs as needed   Progress gait and steps as able   Progress gaze stability exs      Subjective:   Pt states he is not as sore since cutting back exs to 1x/day. No new complaints.          Pain level: 0/10 L shoulder,  L shoulder 2/10 at the worst in the past week   AT EVAL:   Patient reports L shoulder pain is  0/10 pain at present and  8/10 pain at its worst.  Pt also reports pain from cervical spine that radiates to L trap and goes to L elbow, pt reports constant numbness ventral fingers L hand and weakness into L hand. Also neck  pain,                                      4/10 pain at present   6/10 pain at its worst      Objective:         Exercises:    Exercises in bold performed in department today. Items not bolded are carried forward from prior visits for continuity of the record. Exercise/Equipment Resistance/Repetitions HEP Other comments       []      L Shoulder Diagnosis:   []       pendulums 20x CW and 20x CCW [x] Reviewed      Assisted flexion supine Reviewed, 10 x 10 sec  [x] Advised to progress into newly obtained range      ER with cane at 90 deg abd 10 x 10 sec [x] Reviewed and corrected. Tolerating well.       Sleeper stretch 10 x 10 sec [x] Reviewed and corrected form       Serratus ceilng punch         Mid rows (seated ok for now) Red, 1x10 [x] Reviewed for HEP      Serratus punch  5#, 5x12 (pt did more than asked) [x]       Triceps ext supine 5# 3x12-15        Prone shoulder extension to hip with scap retraction  3#, 3x10 [x]      Prone row  3#, 3x12  [x]        Prone horizontal abduction  With scap retraction 2#, 3X12  [x]       Prone scaption  with arm off edge of bed (prone Y with arm off edge) 0#, 3x10  [x]   Cues to keep thumb up      SL ER  3# 3X12 [x]       Shoulder IR with tband Green, 3x12  [x]        Wall push ups  3x10  [x]        Rhythmic stabilization 3x30 sec, distal perturbations []      PNF D1 and D2 UE patterns in supine 2x10 each with min resistance       Manually resisted scap retraction in SL 2x10, mod resistance  Much crepitus noted           Vestibular Diagnosis:    []      Eye Exercises:         Saccades, seated  Pt inst to do this exercise x 1 minute in horizontal direction and x 1 minute in vertical direction. [x]   Reviewed, continue     Corrective saccades, seated Pt inst to do this exercise x 2 minute in horizontal direction and x 2 minute in vertical direction  [x]   Reviewed, continue     VOR x 1 viewing, standing, close target Pt inst to do this exercise x 1 minute with horizontal head turns and x 1 minute with vertical head nods. Pt to only go as fast as he/she can while keeping target clear and in focus. Pt inst to do this ex 3x/day. Today in dept, the patient is able to do 16 reps/min in horizontal direction and 20 reps/min in the vertical direction. VOR x 1 viewing, standing, far target  Pt inst to do this exercise x 1 minute with horizontal head turns and x 1 minute with vertical head nods. Pt to only go as fast as he/she can while keeping target clear and in focus. Pt inst to do this ex 3x/day. Today in dept, the patient is able to do 16 reps/min in horizontal direction and  reps/min in the vertical direction. .        Habituation Exercises:         Rolling B  5 repetitions (Rolling R, center, rolling L, center)   Pt to do 2 times per day  [x]   Pt states this is better, just a little bit of dizziness but notices more dizziness with rolling at night when eyes are closed. So PT inst pt to do this ex during the day with eyes closed. Reviewed, continue    supine to longsit and vice versa  5 repetitions (sit up, lie back down)   Pt to do 2 times per day  Pt states he is still dizzy with this, Reviewed, continue                       scifit  Seat 8, with arms Level 3.0, 10 minutes   Subjective info taken during ex x 5 min      Bridges  2x15 [x]      Standing heel raises 2x15 [x]      Standing ABD  2#, 2x10 B []      Step ups 8\" 2x10 B  [] Single UE     Step ups onto 6\" step, blue foam 2x20 []      Static stance on Blue foam  Conditions   Pt tends to lean posteriorly.      March in place on blue foam  2x20   Single R UE    Static Stance on firm surface Feet apart: conditions 1-6  Feet together: conditions 1-6       Step up and over, 8\" step  Lateral step up and over 6\" step 1x10 B UE; 1x10 L UE alternating pattern  1x10 BUE; 1x10 RUE    Single UE for balance   Lateral eccentric step down, 4\" step 2x10 B  Bilateral UE for balance   Lateral cone step over at // bars 2x  Bilateral UE   Step over cone, forward @ // bars  3x B  B UE, Single UE, No UE                       Therapeutic Exercise/Home Exercise Program:   35 minutes (2U)  HEP has been established, See above,  Reviewed and progressed  PROM to L shoulder  PROM: IR 70 @ 90 deg abd, ER 80 @ 90 deg abd, flex 170, abd 130  Pt inst to do UE and LE strength and ROM exs ONLY 1 TIME PER DAY. Discussed with pt that we will start therapy on cervical fusion diagnosis after completing shoulder therapy. Access Code: [email or web address]   URL: [employer]/   Date: 08/19/2020   Prepared by: Sarita Oh     Exercises   Circular Shoulder Pendulum with Table Support - 20 reps - 2 sets - 2x daily - 7x weekly   Supine Shoulder Flexion AAROM - 10 reps - 10 hold - 2x daily - 7x weekly   Supine Shoulder External Internal Rotation AAROM with Dowel - 10 reps - 10 hold - 2x daily - 7x weekly   Sleeper Stretch - 10 reps - 10 hold - 2x daily - 7x weekly   Standing Shoulder Row with Anchored Resistance - 10-15 reps - 3 sets - 1x daily - 7x weekly   Bridge - 10-15 reps - 3 sets - 1x daily - 7x weekly   Heel rises with counter support - 10-15 reps - 3 sets - 1x daily - 7x weekly   Supine Single Arm Shoulder Protraction - 10-15 reps - 3 sets - 1x daily - 7x weekly   Prone Shoulder Extension - Single Arm - 10-15 reps - 3 sets - 1x daily - 7x weekly   Supine Triceps Extension with Resistance - 10 reps - 3 sets - 1x daily - 7x weekly   Supine Elbow Flexion Extension with Dumbbell - 10-15 reps - 3 sets - 1x daily - 7x weekly   Sidelying Shoulder ER with Towel and Dumbbell - 10-15 reps - 3 sets - 1x daily - 7x weekly   Prone Shoulder Row - 10-15 reps - 3 sets - 1x daily - 7x weekly   Shoulder Internal Rotation with Resistance - 10-15 reps - 3 sets - 1x daily - 7x weekly   Prone Single Arm Shoulder Horizontal Abduction with Dumbbell - Palm Down - 10-15 reps - 3 sets - 1x daily - 7x weekly   Wall Push Up - 10-15 reps - 3 sets - 1x daily - 7x weekly   Prone Single Arm Shoulder Y with Dumbbell - 10-15 reps - 3 sets - 1x daily - 7x weekly         Therapeutic Activity:  0 minutes     Gait: 0 minutes     Neuromuscular Re-Education:  40 minutes (3U)  Continue Eye exercises, see above  Continue habituation exercises, see above  Initiated gaze stability exs, see above        Canalith Repositioning Procedure:  0 minutes    Manual Therapy:  10  Minutes (1U)  GH dist, inf and post glides L shoulder    Modalities: 0 minutes    Functional Outcome Measure:     Measure Used: Dizziness Handicap Inventory   Score: 46/100  Date Assessed: 7/22/20     Measure Used: Dizziness Handicap Inventory   Score: 60/100  Date Assessed: 8/12/20    Assessment/Treatment/Activity Tolerance:     Patients response to treatment:   [x]Patient tolerated treatment well []Patient limited by fatigue   []Patient limited by pain  []Patient limited by other medical complications   []Other:     Goals:     Vestibular GOALS   Short Term Goals:   4 weeks MET NOT MET Long Term Goals:  8  weeks MET NOT MET   1). Establish HEP [x]?  []?  1). Pt independent HEP []?  []?    2). Increase strength 1/3 grade in areas of deficits [x]?  []?  2). Increase strength to 4+/5 or better [x]?  []?    3). Improve Tinetti to 19/20 [x]?  []?  3). Improve Tinetti to 24/28 []?  []?    4). Improve DHI score to 36 or less  []?  []?  4). Improve DHI score to 26 or less []?  []?    5). Pt able to ambulate household distances without AD []?  []?  5). Pt able to do flight of steps with reciprocal pattern with 1 rail  []?  []?    6). []?  []?  6).   Pt able to ambulate community distances without AD  []?  []?        Shoulder  GOALS     Short Term Goals:   3  weeks MET Not MET Long Term Goals:    6 weeks MET Not Met   Establish HEP [x]?  []?  Pt independent with HEP []?  []?    Pain  5/10 or less [x]?  []?  Pain  3/10 or less [x]?  []?    Increase identified strength deficits 1/3 grade  []?  []?  Increase strength to 4+/5 or greater []?  []?    Increase UE PROM 20 degrees with IR and ER  And 10 degrees with abduction and flexion  []?  []?  Achieve WFL AROM  []?  []?    Report increased tolerance to ADLs without increased pain  [x]?  []?  Return to all ADLS without limitation []?  []?      []?  []?    []?  []?        Prognosis: [x]Good   []Fair   []Poor    Patient Requires Follow-up:  [x]Yes  []No    Plan: []Plan of care initiated     [x]Continue per plan of care    [] Alter current plan (see comments)    []Hold pending MD visit []Discharge       Timed Code Treatment Minutes:  85  (total with both diagnoses)     Total Treatment Minutes:  85 + nustep  (total with both diagnoses)     Medicare Cap total YTD:   [x]N/A    Workers Comp Time Stamp  []N/A   Time In: 220   Time Out: 350  (Total for both diagnoses)    Electronically signed by:  MARILIA Sawyer, [id number]

## 2020-08-24 ENCOUNTER — HOSPITAL ENCOUNTER (OUTPATIENT)
Dept: PHYSICAL THERAPY | Age: 65
Setting detail: THERAPIES SERIES
Discharge: HOME | End: 2020-08-24
Payer: COMMERCIAL

## 2020-08-24 PROCEDURE — 97110 THERAPEUTIC EXERCISES: CPT

## 2020-08-24 PROCEDURE — 97140 MANUAL THERAPY 1/> REGIONS: CPT

## 2020-08-24 PROCEDURE — 97112 NEUROMUSCULAR REEDUCATION: CPT

## 2020-08-24 NOTE — FLOWSHEET NOTE
Outpatient Physical Therapy     [x] Daily Treatment Note   [] Progress Note   [] Discharge Note    Date:  8/24/2020    Patient Name:  Vito Hernandez         YOB: 1955    Medical Diagnosis: unspecified disorder of vestibular system H81.90 ; ORIF clavicle, I S42.022K displaced fx shaft of L clavicle                                                    Treatment Diagnosis:  Imbalance, dizziness/vertigo ;  L shoulder pain, decreased mobility, weakness          Onset Date:    2/14/2019             Referral Date:  7/17/20 (vestibular)  / 7/13/20 (shoulder)              Referring Physician:  Kory Paulino (vestibular) / Dr. Stephanie Sanford (shoulder)                                                    Visits Allowed/Insurance/Certification Information:  Vestibular [facility], 16 visits approved through 9/25/20  / shoulder [facility], 12 visits approved until 8/21/20     Restrictions/Precautions: HTN, DM, hx of upper cervical fractures, recent neck surgery 6/2020     Plan of care sent to provider Oneil Dsouza):      [x]Faxed  []Co-signature    (attempts: 1[x] 2 []3[])      Plan of care signed:      [x]Yes date: 7/27/20              Plan of care sent to provider Maco Singh):      [x]Faxed  []Co-signature    (attempts: 1[x] 2 []3[])     Plan of care signed:      [x]Yes date:   7/23/20            Progress Note covers period from (if applicable):    [x]NA    []From 8/12/20           Next Progress Note due:   9/2/20    Visit# / total visits:  Vestibular 10/16  Shoulder 10/12     Plan for Next Session:     Shoulder joint mobilization, PROM, progress ROM and strength saida scapula   Progress LE strength and flexibility   Progress balance activities    Progress eye exercises   Progress habituation exs as needed   Progress gait and steps as able   Progress gaze stability exs      Subjective:   Pt states that his shoulder feels tight ever since he dropped his exercises to 1x/day. Using 5 lbs.      Still feels dizzy with supine to sitting activity. Pain level: 0/10 L shoulder,  L shoulder 2/10 at the worst in the past week   AT EVAL:   Patient reports L shoulder pain is  0/10 pain at present and  8/10 pain at its worst.  Pt also reports pain from cervical spine that radiates to L trap and goes to L elbow, pt reports constant numbness ventral fingers L hand and weakness into L hand. Also neck  pain,                                      4/10 pain at present   6/10 pain at its worst      Objective:         Exercises:    Exercises in bold performed in department today. Items not bolded are carried forward from prior visits for continuity of the record. Exercise/Equipment Resistance/Repetitions HEP Other comments       []      L Shoulder Diagnosis:   []       pendulums 20x CW and 20x CCW [x] Reviewed      Assisted flexion supine Reviewed, 10 x 10 sec  [x] Advised to progress into newly obtained range      ER with cane at 90 deg abd 10 x 10 sec [x] Reviewed and corrected. Tolerating well.       Sleeper stretch 10 x 10 sec [x] Reviewed and corrected form       Serratus ceilng punch         Mid rows (seated ok for now) Red, 1x10 [x] Reviewed for HEP      Serratus punch  5#, 5x12 (pt did more than asked) [x]       Triceps ext supine 5# 3x12-15        Prone shoulder extension to hip with scap retraction  3#, 3x12 [x] Cues to maintain retraction, to reduce crepitus with extension     Prone row  3#, 3x15  [x]   Crepitus noted in scapula     Prone horizontal abduction  With scap retraction 3#, 3X15  [x]       Prone scaption  with arm off edge of bed (prone Y with arm off edge) 0#, 3x15   [x]   Cues to keep thumb up      SL ER  3# 3X12 [x]       Shoulder IR with tband Green, 3x12  [x]        Wall push ups  3x10  [x]        Rhythmic stabilization 3x30 sec, distal perturbations []      PNF D1 and D2 UE patterns in   standing                                                          2x10 each   [] Gentle assist to achieve full ROM Standing Abduction  2x10  Gentle assist to achieve full ROM   Standing  Forward flexion  2x10   Gentle assist to achieve full ROM   Manually resisted scap retraction in SL 2x10, mod resistance  Much crepitus noted           Vestibular Diagnosis:    []      Eye Exercises:         Saccades, seated  Pt inst to do this exercise x 1 minute in horizontal direction and x 1 minute in vertical direction. [x]   Reviewed, continue     Corrective saccades, seated Pt inst to do this exercise x 2 minute in horizontal direction and x 2 minute in vertical direction  [x]   Reviewed, continue     VOR x 1 viewing, standing, close target Pt inst to do this exercise x 1 minute with horizontal head turns and x 1 minute with vertical head nods. Pt to only go as fast as he/she can while keeping target clear and in focus. Pt inst to do this ex 3x/day. Today in dept, the patient is able to do 25 reps/min in horizontal direction and 20 reps/min in the vertical direction. VOR x 1 viewing, standing, far target  Pt inst to do this exercise x 1 minute with horizontal head turns and x 1 minute with vertical head nods. Pt to only go as fast as he/she can while keeping target clear and in focus. Pt inst to do this ex 3x/day. Today in dept, the patient is able to do 16 reps/min in horizontal direction and  reps/min in the vertical direction. VOR Checkerboard Attempted. Provoked dizziness and loss of balance backwards. Unable to focus on x, without seeing 2 of them. .        Habituation Exercises:         Rolling B  5 repetitions (Rolling R, center, rolling L, center)   Pt to do 2 times per day  [x]   Pt states this is better, just a little bit of dizziness but notices more dizziness with rolling at night when eyes are closed. So PT inst pt to do this ex during the day with eyes closed.    Reviewed, continue    supine to longsit and vice versa  5 repetitions (sit up, lie back down)   Pt to do 2 times per day  Pt daily - 7x weekly   Bridge - 10-15 reps - 3 sets - 1x daily - 7x weekly   Heel rises with counter support - 10-15 reps - 3 sets - 1x daily - 7x weekly   Supine Single Arm Shoulder Protraction - 10-15 reps - 3 sets - 1x daily - 7x weekly   Prone Shoulder Extension - Single Arm - 10-15 reps - 3 sets - 1x daily - 7x weekly   Supine Triceps Extension with Resistance - 10 reps - 3 sets - 1x daily - 7x weekly   Supine Elbow Flexion Extension with Dumbbell - 10-15 reps - 3 sets - 1x daily - 7x weekly   Sidelying Shoulder ER with Towel and Dumbbell - 10-15 reps - 3 sets - 1x daily - 7x weekly   Prone Shoulder Row - 10-15 reps - 3 sets - 1x daily - 7x weekly   Shoulder Internal Rotation with Resistance - 10-15 reps - 3 sets - 1x daily - 7x weekly   Prone Single Arm Shoulder Horizontal Abduction with Dumbbell - Palm Down - 10-15 reps - 3 sets - 1x daily - 7x weekly   Wall Push Up - 10-15 reps - 3 sets - 1x daily - 7x weekly   Prone Single Arm Shoulder Y with Dumbbell - 10-15 reps - 3 sets - 1x daily - 7x weekly       Therapeutic Activity:  0 minutes     Gait: 0 minutes     Neuromuscular Re-Education:  25 minutes (2U)  Continue Eye exercises, see above  Continue habituation exercises, see above  Initiated gaze stability exs, see above       Canalith Repositioning Procedure:  0 minutes    Manual Therapy:  25 Minutes (2U)  GH dist, inf and post glides L shoulder  Deep release to subscapularis, UT, and subclavius mms. Achieved 180 degrees PROM/AAROM flexion at end of tx.     Modalities: 0 minutes    Functional Outcome Measure:     Measure Used: Dizziness Handicap Inventory   Score: 46/100  Date Assessed: 7/22/20     Measure Used: Dizziness Handicap Inventory   Score: 60/100  Date Assessed: 8/12/20    Assessment/Treatment/Activity Tolerance:     Patients response to treatment:   [x]Patient tolerated treatment well []Patient limited by fatigue   []Patient limited by pain  []Patient limited by other medical complications   []Other:     Goals:     Vestibular GOALS   Short Term Goals:   4 weeks MET NOT MET Long Term Goals:  8  weeks MET NOT MET   1). Establish HEP [x]?  []?  1). Pt independent HEP []?  []?    2). Increase strength 1/3 grade in areas of deficits [x]?  []?  2). Increase strength to 4+/5 or better [x]?  []?    3). Improve Tinetti to 19/20 [x]?  []?  3). Improve Tinetti to 24/28 []?  []?    4). Improve DHI score to 36 or less  []?  []?  4). Improve DHI score to 26 or less []?  []?    5). Pt able to ambulate household distances without AD []?  []?  5). Pt able to do flight of steps with reciprocal pattern with 1 rail  []?  []?    6). []?  []?  6). Pt able to ambulate community distances without AD  []?  []?        Shoulder  GOALS     Short Term Goals:   3  weeks MET Not MET Long Term Goals:    6 weeks MET Not Met   Establish HEP [x]?  []?  Pt independent with HEP []?  []?    Pain  5/10 or less [x]?  []?  Pain  3/10 or less [x]?  []?    Increase identified strength deficits 1/3 grade  []?  []?  Increase strength to 4+/5 or greater []?  []?    Increase UE PROM 20 degrees with IR and ER  And 10 degrees with abduction and flexion  []?  []?  Achieve WFL AROM  []?  []?    Report increased tolerance to ADLs without increased pain  [x]?  []?  Return to all ADLS without limitation []?  []?      []?  []?    []?  []?        Prognosis: [x]Good   []Fair   []Poor    Patient Requires Follow-up:  [x]Yes  []No    Plan: []Plan of care initiated     [x]Continue per plan of care    [] Alter current plan (see comments)    []Hold pending MD visit []Discharge       Timed Code Treatment Minutes:  85  (total with both diagnoses)     Total Treatment Minutes:  85 + nustep  (total with both diagnoses)     Medicare Cap total YTD:   [x]N/A    Workers Comp Time Stamp  []N/A   Time In: 225   Time Out: 350  (Total for both diagnoses)    Electronically signed by:  CHAPIS Israel, OMT-C, [address]

## 2020-08-26 ENCOUNTER — HOSPITAL ENCOUNTER (OUTPATIENT)
Dept: PHYSICAL THERAPY | Age: 65
Setting detail: THERAPIES SERIES
Discharge: HOME | End: 2020-08-26
Payer: COMMERCIAL

## 2020-08-26 PROCEDURE — 97112 NEUROMUSCULAR REEDUCATION: CPT

## 2020-08-26 PROCEDURE — 97110 THERAPEUTIC EXERCISES: CPT

## 2020-08-26 PROCEDURE — 97140 MANUAL THERAPY 1/> REGIONS: CPT

## 2020-08-26 NOTE — FLOWSHEET NOTE
Outpatient Physical Therapy     [x] Daily Treatment Note   [] Progress Note   [] Discharge Note    Date:  8/26/2020    Patient Name:  Laly Johns         YOB: 1955    Medical Diagnosis: unspecified disorder of vestibular system H81.90 ; ORIF clavicle, I S42.022K displaced fx shaft of L clavicle                                                    Treatment Diagnosis:  Imbalance, dizziness/vertigo ;  L shoulder pain, decreased mobility, weakness          Onset Date:    2/14/2019             Referral Date:  7/17/20 (vestibular)  / 7/13/20 (shoulder)              Referring Physician:  Kat Mcrae (vestibular) / Dr. Barbara Amato (shoulder)                                                    Visits Allowed/Insurance/Certification Information:  Vestibular [facility], 16 visits approved through 9/25/20  / shoulder [facility], 12 visits approved until 8/21/20     Restrictions/Precautions: HTN, DM, hx of upper cervical fractures, recent neck surgery 6/2020     Plan of care sent to provider Hussein Trejo):      [x]Faxed  []Co-signature    (attempts: 1[x] 2 []3[])      Plan of care signed:      [x]Yes date: 7/27/20              Plan of care sent to provider Se Zavala):      [x]Faxed  []Co-signature    (attempts: 1[x] 2 []3[])     Plan of care signed:      [x]Yes date:   7/23/20            Progress Note covers period from (if applicable):    [x]NA    []From 8/12/20           Next Progress Note due:   9/2/20    Visit# / total visits:  Vestibular 11/16  Shoulder 11/12     Plan for Next Session:     Shoulder joint mobilization, PROM, progress ROM and strength saida scapula   Progress LE strength and flexibility   Progress balance activities    Progress eye exercises   Progress habituation exs as needed   Progress gait and steps as able   Progress gaze stability exs      Subjective:   A little sore in the shoulder. Rates it a 2-3.   Has not been doing anything different that he can think of.    Dizziness not changed. Pain level: 2-3/10    AT EVAL:   Patient reports L shoulder pain is  0/10 pain at present and  8/10 pain at its worst.  Pt also reports pain from cervical spine that radiates to L trap and goes to L elbow, pt reports constant numbness ventral fingers L hand and weakness into L hand. Also neck  pain,                                      4/10 pain at present   6/10 pain at its worst      Objective:         Exercises:    Exercises in bold performed in department today. Items not bolded are carried forward from prior visits for continuity of the record. Exercise/Equipment Resistance/Repetitions HEP Other comments       []      L Shoulder Diagnosis:   []       pendulums 20x CW and 20x CCW [x] Reviewed      Assisted flexion supine Reviewed, 10 x 10 sec  [x] Advised to progress into newly obtained range      ER with cane at 90 deg abd 10 x 10 sec [x] Reviewed and corrected. Tolerating well. Sleeper stretch 10 x 10 sec [x] Reviewed and corrected form       Serratus ceilng punch         Mid rows (seated ok for now) Red, 1x10 [x] Reviewed for HEP      Serratus punch  5#, 5x12 (pt did more than asked) [x]       Triceps ext supine 5# 3x12-15        Prone shoulder extension to hip with scap retraction  5#, 3x15 [x] Cues to maintain retraction, to reduce crepitus with extension. Advanced to 5 lbs to check form because pt states he is using 5 lbs at home. Prone row  5#, 3x15  [x]   Crepitus noted in scapula     Prone horizontal abduction  With scap retraction 3#, 3X15  [x] Unable to maintain form with 5#      Prone scaption  with arm off edge of bed (prone Y with arm off edge) 0#, 3x15   [x]   Cues to keep thumb up      SL ER  3# 3X12 [x]       Shoulder IR with tband Green, 3x12  [x]        Wall push ups  x10  [x]   Significant winging of scapula noted.       Wall push up with serratus plus 2x10  Noted significant winging of scapula     Rhythmic stabilization 3x30 sec, distal perturbations [] PNF D1 and D2 UE patterns in   standing                                                          2x10 each   [] Gentle assist to achieve full ROM     Standing Abduction  2x10  Gentle assist to achieve full ROM   Standing  Forward flexion  2x10   Gentle assist to achieve full ROM   Manually resisted scap retraction in SL 2x10, mod resistance  Much crepitus noted   Standing wall slide  2x10             Vestibular Diagnosis:    []      Eye Exercises:         Saccades, seated  Pt inst to do this exercise x 1 minute in horizontal direction and x 1 minute in vertical direction. [x]   Reviewed, continue     Corrective saccades, seated Pt inst to do this exercise x 2 minute in horizontal direction and x 2 minute in vertical direction  [x]   Reviewed, continue     VOR x 1 viewing, standing, close target Pt inst to do this exercise x 1 minute with horizontal head turns and x 1 minute with vertical head nods. Pt to only go as fast as he/she can while keeping target clear and in focus. Pt inst to do this ex 3x/day. Today in dept, the patient is able to do 23, 24, 26 reps/min Horizontal     24,  24, reps/min vertical        VOR x 1 viewing, standing, far target  Pt inst to do this exercise x 1 minute with horizontal head turns and x 1 minute with vertical head nods. Pt to only go as fast as he/she can while keeping target clear and in focus. Pt inst to do this ex 3x/day. 25 reps Horizontal  23 reps vertical      VOR Checkerboard Able to keep in focus today   Horizontal  1x5  Vertical     1x5   Unable to keep in focus after first set. .        Habituation Exercises:         Rolling B  5 repetitions (Rolling R, center, rolling L, center)   Pt to do 2 times per day  [x]   Pt states this is better, just a little bit of dizziness but notices more dizziness with rolling at night when eyes are closed. So PT inst pt to do this ex during the day with eyes closed.    Reviewed, continue    supine to longsit and vice versa 5 repetitions (sit up, lie back down)   Pt to do 2 times per day  Pt states he is still dizzy with this, Reviewed, continue   Modified [place]  x12  Limited by cervical ROM   PNF Wall taps in standing 2x10             scifit  Seat 8, with arms Level 3.0, 10 minutes   At end of session       Bridges  2x15 [x]      Standing heel raises 2x15 [x]      Standing ABD  2#, 2x10 B []      Step ups 8\" 2x10 B  [] Single UE     Step ups onto 6\" step, blue foam 2x20 []      Static stance on Blue foam  Conditions   Pt tends to lean posteriorly. March in place on blue foam  2x20   Single R UE    Static Stance on firm surface ;   Repeated On blue foam Feet apart: conditions 1-6  Feet together: conditions 1-6    In corner    Step up and over, 8\" step  Lateral step up and over 6\" step 1x10 B UE; 1x10 L UE alternating pattern  1x10 BUE; 1x10 RUE    Single UE for balance   Lateral eccentric step down, 4\" step 2x10 B  Bilateral UE for balance   Lateral cone step over at // bars 2x  Bilateral UE   Step over cone, forward @ // bars  3x B  B UE, Single UE, No UE                       Therapeutic Exercise/Home Exercise Program:   35 minutes (2U)  HEP has been established, See above,  Reviewed and progressed  AROM  to L shoulder  165 active flexion in supine,  130 active abd  PROM: IR 76 @ 90 deg abd, ER 60 @ 90 deg abd (feels tight today), flex 175, abd 147  Pt inst to do UE and LE strength and ROM exs ONLY 1 TIME PER DAY. Reminded pt again that we will start therapy on cervical fusion diagnosis after completing shoulder therapy. Pt verbalized understanding. Access Code: [email or web address]   URL: [email or web address]   Date: 08/19/2020   Prepared by: Amarjit Okeefe     Exercises   Circular Shoulder Pendulum with Table Support - 20 reps - 2 sets - 2x daily - 7x weekly   Supine Shoulder Flexion AAROM - 10 reps - 10 hold - 2x daily - 7x weekly   Supine Shoulder External Internal Rotation AAROM with Dowel - 10 reps - 10 hold - 2x Patients response to treatment:   [x]Patient tolerated treatment well []Patient limited by fatigue   []Patient limited by pain  []Patient limited by other medical complications   []Other:     Goals:     Vestibular GOALS   Short Term Goals:   4 weeks MET NOT MET Long Term Goals:  8  weeks MET NOT MET   1). Establish HEP [x]?  []?  1). Pt independent HEP []?  []?    2). Increase strength 1/3 grade in areas of deficits [x]?  []?  2). Increase strength to 4+/5 or better [x]?  []?    3). Improve Tinetti to 19/20 [x]?  []?  3). Improve Tinetti to 24/28 []?  []?    4). Improve DHI score to 36 or less  []?  []?  4). Improve DHI score to 26 or less []?  []?    5). Pt able to ambulate household distances without AD []?  []?  5). Pt able to do flight of steps with reciprocal pattern with 1 rail  []?  []?    6). []?  []?  6). Pt able to ambulate community distances without AD  []?  []?        Shoulder  GOALS     Short Term Goals:   3  weeks MET Not MET Long Term Goals:    6 weeks MET Not Met   Establish HEP [x]?  []?  Pt independent with HEP []?  []?    Pain  5/10 or less [x]?  []?  Pain  3/10 or less [x]?  []?    Increase identified strength deficits 1/3 grade  []?  []?  Increase strength to 4+/5 or greater []?  []?    Increase UE PROM 20 degrees with IR and ER  And 10 degrees with abduction and flexion  []?  []?  Achieve WFL AROM  []?  []?    Report increased tolerance to ADLs without increased pain  [x]?  []?  Return to all ADLS without limitation []?  []?      []?  []?    []?  []?        Prognosis: [x]Good   []Fair   []Poor    Patient Requires Follow-up:  [x]Yes  []No    Plan: []Plan of care initiated     [x]Continue per plan of care    [] Alter current plan (see comments)    []Hold pending MD visit []Discharge       Timed Code Treatment Minutes:  85  (total with both diagnoses)     Total Treatment Minutes:  85 + nustep  (total with both diagnoses)     Medicare Cap total YTD:   [x]N/A    Workers Comp Time Stamp  []N/A   Time In: 1330   Time Out: 1455  (Total for both diagnoses)    Electronically signed by:  Petra Maciel, CHAPIS, OMT-C, [id number]

## 2020-08-31 ENCOUNTER — HOSPITAL ENCOUNTER (OUTPATIENT)
Dept: PHYSICAL THERAPY | Age: 65
Setting detail: THERAPIES SERIES
Discharge: HOME | End: 2020-08-31
Payer: COMMERCIAL

## 2020-08-31 PROCEDURE — 97116 GAIT TRAINING THERAPY: CPT

## 2020-08-31 PROCEDURE — 97140 MANUAL THERAPY 1/> REGIONS: CPT

## 2020-08-31 PROCEDURE — 97112 NEUROMUSCULAR REEDUCATION: CPT

## 2020-08-31 PROCEDURE — 97110 THERAPEUTIC EXERCISES: CPT

## 2020-08-31 NOTE — FLOWSHEET NOTE
Outpatient Physical Therapy     [x] Daily Treatment Note   [] Progress Note   [] Discharge Note    Date:  8/31/2020    Patient Name:  Tariq Reed         YOB: 1955    Medical Diagnosis: unspecified disorder of vestibular system H81.90 ; ORIF clavicle, I S42.022K displaced fx shaft of L clavicle                                                    Treatment Diagnosis:  Imbalance, dizziness/vertigo ;  L shoulder pain, decreased mobility, weakness          Onset Date:    2/14/2019             Referral Date:  7/17/20 (vestibular)  / 7/13/20 (shoulder)              Referring Physician:  Leigh Mota (vestibular) / Dr. Leah Almaraz (shoulder)                                                    Visits Allowed/Insurance/Certification Information:  Vestibular [facility], 16 visits approved through 9/25/20  / shoulder [facility], 12 visits approved until 8/21/20     Restrictions/Precautions: HTN, DM, hx of upper cervical fractures, recent neck surgery 6/2020     Plan of care sent to provider Shanon Hickey):      [x]Faxed  []Co-signature    (attempts: 1[x] 2 []3[])      Plan of care signed:      [x]Yes date: 7/27/20              Plan of care sent to provider Christian Morrison):      [x]Faxed  []Co-signature    (attempts: 1[x] 2 []3[])     Plan of care signed:      [x]Yes date:   7/23/20            Progress Note covers period from (if applicable):    [x]NA    []From 8/12/20           Next Progress Note due:   9/2/20    Visit# / total visits:  Vestibular 11/16  Shoulder 12/12     Plan for Next Session:     Shoulder joint mobilization, PROM, progress ROM and strength saida scapula   Progress LE strength and flexibility   Progress balance activities    Progress eye exercises   Progress habituation exs as needed   Progress gait and steps as able   Progress gaze stability exs      Subjective:   Shoulder:  Pt states his shoulder is ok but still gets a little pain.  Pt will get cramps/spasms after the exercises or trying to use it which subsides after rest.      Vestibular: Rolling exercises have helped. Feels maybe 85% better with that. Supine to sit still makes him pretty dizzy. Not like he was before and does not have to hold on to the bed because things are moving, but it still does make him very dizzy. States he did the exercises approx 5 times. States he did not do much this weekend, denies being outside (noticibly sun tanned). Pain level: 0/10    AT EVAL:   Patient reports L shoulder pain is  0/10 pain at present and  8/10 pain at its worst.  Pt also reports pain from cervical spine that radiates to L trap and goes to L elbow, pt reports constant numbness ventral fingers L hand and weakness into L hand. Also neck  pain,                                      4/10 pain at present   6/10 pain at its worst      Objective:         Exercises:    Exercises in bold performed in department today. Items not bolded are carried forward from prior visits for continuity of the record. Exercise/Equipment Resistance/Repetitions HEP Other comments       []      L Shoulder Diagnosis:   []       pendulums 20x CW and 20x CCW [x] Reviewed       flexion supine x10 [x] Advised to progress into newly obtained range      ER with cane at 90 deg abd 10 x 10 sec [x] Reviewed and corrected. Tolerating well. Sleeper stretch 10 x 10 sec [x] Reviewed and corrected form       Serratus ceilng punch         Mid rows (seated ok for now) Red, 1x10 [x] Progressed to blue band      Serratus punch  5#, 5x12 (pt did more than asked) [x]       Triceps ext supinea 5# 3x12-15        Prone shoulder extension to hip with scap retraction  5#, 3x15 [x] Cues to maintain retraction, to reduce crepitus with extension. Advanced to 5 lbs to check form because pt states he is using 5 lbs at home.      Prone row  5#, 3x15  [x]   Crepitus noted in scapula     Prone horizontal abduction  With scap retraction 3#, 3X15  [x] Unable to maintain form with 5#      Prone scaption  with arm off edge of bed (prone Y with arm off edge) 0#, 3x15   [x]   Cues to keep thumb up      SL ER  2# 2X10 [] With scapular retraction      Shoulder IR with tband Green, 3x12  [x]        Wall push ups  x10  [x]   Significant winging of scapula noted. Wall push up with serratus plus 2x10  Noted significant winging of scapula     Rhythmic stabilization 3x30 sec, distal perturbations []      PNF D1 and D2 UE patterns in   standing                                                          2x10 each   [] Gentle assist to achieve full ROM     Standing Abduction  2x10  Gentle assist to achieve full ROM   Standing  Forward flexion  2x10   Gentle assist to achieve full ROM   Manually resisted scap retraction in SL 2x10, mod resistance  Much crepitus noted   Standing wall slide  2x10     SL HAB (LUE) x10  1# x10  Cuing to incr eccentric control and maintaining scapular retraction with eccentric lowering     Vestibular Diagnosis:    []      Eye Exercises:         Saccades, seated  Pt inst to do this exercise x 1 minute in horizontal direction and x 1 minute in vertical direction. [x]   Reviewed, continue     Corrective saccades, seated Pt inst to do this exercise x 2 minute in horizontal direction and x 2 minute in vertical direction  [x]   Reviewed, continue     VOR x 1 viewing, standing, close target Pt inst to do this exercise x 1 minute with horizontal head turns and x 1 minute with vertical head nods. Pt to only go as fast as he/she can while keeping target clear and in focus. Pt inst to do this ex 3x/day. 19, 19, 18 reps/min Horizontal   (23, 24, 26   Last visit)      26, 25 reps/min vertical (24,  24 last visit)         VOR x 1 viewing, standing, far target  Pt inst to do this exercise x 1 minute with horizontal head turns and x 1 minute with vertical head nods. Pt to only go as fast as he/she can while keeping target clear and in focus. Pt inst to do this ex 3x/day.      19 reps Horizontal  23 reps vertical      VOR Checkerboard Able to keep in focus today   Horizontal  1x5  Vertical     1x5   Unable to keep in focus after first set. .        Habituation Exercises:         Rolling B  5 repetitions (Rolling R, center, rolling L, center)   Pt to do 2 times per day  [x]   Pt states this is better, just a little bit of dizziness but notices more dizziness with rolling at night when eyes are closed. So PT inst pt to do this ex during the day with eyes closed. Reviewed, continue   supine to longsit and vice versa  5 repetitions (sit up, lie back down)   Pt to do 2 times per day  Pt states he is still dizzy with this, Reviewed, continue   Modified Lorenzo Daroff  x10  Limited by cervical ROM   PNF Wall taps in standing 2x10             scifit  Seat 8, with arms Level 4.0, 10 minutes   At end of session       Bridges  2x15 [x]      Standing heel raises 2x15 [x]      Standing ABD  2#, 2x10 B []      Step ups 8\" 2x10 B  [] Single UE     Step ups onto 6\" step, blue foam 2x20 []      Static stance on Blue foam  Conditions   Pt tends to lean posteriorly.      March in place on blue foam  2x20   Single R UE    Static Stance on firm surface ;   Repeated On blue foam Feet apart: conditions 1-6  Feet together: conditions 1-6    In corner    Step up and over, 8\" step  Lateral step up and over 6\" step 1x10 B UE; 1x10 L UE alternating pattern  1x10 BUE; 1x10 RUE    Single UE for balance   Lateral eccentric step down, 4\" step 2x10 B  Bilateral UE for balance   Lateral cone step over at // bars 2x  Bilateral UE   Step over cone, forward @ // bars  3x B  B UE, Single UE, No UE                       Therapeutic Exercise/Home Exercise Program:   35 minutes (2U) (14:)  HEP has been established, See above,  Reviewed and progressed  AROM  to L shoulder  165 active flexion in supine,  130 active abd  PROM: IR 76 @ 90 deg abd, ER 60 @ 90 deg abd (feels tight today), flex 175, abd 147  Pt inst to do UE and LE strength and ROM exs ONLY 1 TIME PER DAY. Reminded pt again that we will start therapy on cervical fusion diagnosis after completing shoulder therapy. Pt verbalized understanding. Access Code: [email or web address]   URL: [email or web address]   Date: 08/19/2020   Prepared by: Brenda Louis     Exercises   Circular Shoulder Pendulum with Table Support - 20 reps - 2 sets - 2x daily - 7x weekly   Supine Shoulder Flexion AAROM - 10 reps - 10 hold - 2x daily - 7x weekly   Supine Shoulder External Internal Rotation AAROM with Dowel - 10 reps - 10 hold - 2x daily - 7x weekly   Sleeper Stretch - 10 reps - 10 hold - 2x daily - 7x weekly   Standing Shoulder Row with Anchored Resistance - 10-15 reps - 3 sets - 1x daily - 7x weekly   Bridge - 10-15 reps - 3 sets - 1x daily - 7x weekly   Heel rises with counter support - 10-15 reps - 3 sets - 1x daily - 7x weekly   Supine Single Arm Shoulder Protraction - 10-15 reps - 3 sets - 1x daily - 7x weekly   Prone Shoulder Extension - Single Arm - 10-15 reps - 3 sets - 1x daily - 7x weekly   Supine Triceps Extension with Resistance - 10 reps - 3 sets - 1x daily - 7x weekly   Supine Elbow Flexion Extension with Dumbbell - 10-15 reps - 3 sets - 1x daily - 7x weekly   Sidelying Shoulder ER with Towel and Dumbbell - 10-15 reps - 3 sets - 1x daily - 7x weekly   Prone Shoulder Row - 10-15 reps - 3 sets - 1x daily - 7x weekly   Shoulder Internal Rotation with Resistance - 10-15 reps - 3 sets - 1x daily - 7x weekly   Prone Single Arm Shoulder Horizontal Abduction with Dumbbell - Palm Down - 10-15 reps - 3 sets - 1x daily - 7x weekly   Wall Push Up - 10-15 reps - 3 sets - 1x daily - 7x weekly   Prone Single Arm Shoulder Y with Dumbbell - 10-15 reps - 3 sets - 1x daily - 7x weekly       Therapeutic Activity:  0 minutes     Gait: 20 minutes (1 U) (9653 - 0973)   Dynamic Gait Exercises:    Amb without cane, 60 ft , slow vinita, SBA    Amb without cane, Head turns L/R, 60 ft , SBA. MIN stagger. Amb without cane, Head nods Up/Dn, 60 ft, SBA  MOD stagger. Amb with cues for rapid vinita, 60 ft, SBA. Unable to increase speed for this task. Amb with Hip Flexion High steps. 45 seconds hesitancy to begin activity. Progressed 60 ft x4 with slow vinita, significant righting reactions but no loss of balance, SBA. Upon completion of gait activities, pt requested cane to ambulate towards SciFit. Amb 52 ft to Scifit with use of cane. Set cane down approx 10 steps away from machine, removed gait belt from self, and walked to SciFit independently    Neuromuscular Re-Education:  24 minutes (2U)   (2012 - 3876)  Continue Eye exercises, see above  Continue habituation exercises, see above  Initiated gaze stability exs, see above. Notes increased posterior sway, however no loss of balance . SciFit       Canalith Repositioning Procedure:  0 minutes    Manual Therapy:  15 Minutes (1U) (14:15-14:30)  GH dist, inf and post glides L shoulder  Deep release to L pec, lat, infraspinatus  L clavicle scar mobility   Achieved WNL PROM/AAROM flexion at end of tx  Educated on self-release to pec with ROM    Modalities: 0 minutes    Functional Outcome Measure:     Measure Used: Dizziness Handicap Inventory   Score: 46/100  Date Assessed: 7/22/20     Measure Used: Dizziness Handicap Inventory   Score: 60/100  Date Assessed: 8/12/20    Assessment/Treatment/Activity Tolerance:     Patients response to treatment:   [x]Patient tolerated treatment well []Patient limited by fatigue   []Patient limited by pain  []Patient limited by other medical complications   []Other:     Goals:     Vestibular GOALS   Short Term Goals:   4 weeks MET NOT MET Long Term Goals:  8  weeks MET NOT MET   1). Establish HEP [x]?  []?  1). Pt independent HEP []?  []?    2). Increase strength 1/3 grade in areas of deficits [x]?  []?  2). Increase strength to 4+/5 or better [x]?  []?    3). Improve Tinetti to 19/20 [x]?  []?  3).   Improve Tinetti to 24/28 []?  []?    4). Improve DHI score to 36 or less  []?  []?  4). Improve DHI score to 26 or less []?  []?    5). Pt able to ambulate household distances without AD []?  []?  5). Pt able to do flight of steps with reciprocal pattern with 1 rail  []?  []?    6). []?  []?  6). Pt able to ambulate community distances without AD  []?  []?        Shoulder  GOALS     Short Term Goals:   3  weeks MET Not MET Long Term Goals:    6 weeks MET Not Met   Establish HEP [x]?  []?  Pt independent with HEP []?  []?    Pain  5/10 or less [x]?  []?  Pain  3/10 or less [x]?  []?    Increase identified strength deficits 1/3 grade  []?  []?  Increase strength to 4+/5 or greater []?  []?    Increase UE PROM 20 degrees with IR and ER  And 10 degrees with abduction and flexion  []?  []?  Achieve WFL AROM  []?  []?    Report increased tolerance to ADLs without increased pain  [x]?  []?  Return to all ADLS without limitation []?  []?      []?  []?    []?  []?        Prognosis: [x]Good   []Fair   []Poor    Patient Requires Follow-up:  [x]Yes  []No    Plan: []Plan of care initiated     [x]Continue per plan of care    [] Alter current plan (see comments)    []Hold pending MD visit []Discharge       Timed Code Treatment Minutes:  94  (total with both diagnoses)     Total Treatment Minutes:  94  (total with both diagnoses)     Medicare Cap total YTD:   [x]N/A    Workers Comp Time Stamp  []N/A   Time In: 0792    Time Out: 4993  (Total for both diagnoses)    Electronically signed by:  Berta Kirby, MPT, OMT-C, [id number]  [address],  [facility], DPT, ATC [id number]

## 2020-09-02 ENCOUNTER — HOSPITAL ENCOUNTER (OUTPATIENT)
Dept: PHYSICAL THERAPY | Age: 65
Setting detail: THERAPIES SERIES
Discharge: HOME | End: 2020-09-02
Payer: COMMERCIAL

## 2020-09-02 PROCEDURE — 97161 PT EVAL LOW COMPLEX 20 MIN: CPT

## 2020-09-02 PROCEDURE — 97110 THERAPEUTIC EXERCISES: CPT

## 2020-09-02 NOTE — PROGRESS NOTES
The following patient has been evaluated for physical therapy services. In order for therapy to continue, physician review of the treatment plan is needed. Please review the attached evaluation and/or summary of the patient's plan of care, and verify that you agree by signing below and sending it back to our office. Thank you for this referral.    Physician signature_______________________ Date________________    Fax to:   [facility] [phone]       UPPER QUARTER C-T PHYSICAL THERAPY EVALUATION        Evaluation Date:  9/2/2020         Patient Name:  Bob Santoro       YOB: 1955       Medical Diagnosis/ ICD 10:  Cervical fusion ICD 10 not on script    Treatment Diagnosis:  Cervical pain, decreased ROM, LUE weakness and paresthesia       Onset Date: 2/14/2019     Referral Date:  8/17/20     Referring Physician: Adan Johnson MD        Visits Allowed/Insurance/Certification Information:  [facility], No C9 on file yet, see Margaret Madrigal note    Restrictions/Precautions: HTN, DM, hx of upper cervical fractures, recent neck surgery 6/2020     Health History reviewed with pt:   [x]Yes   []No          SUBJECTIVE FINDINGS        History of Present Illness:    Pt presents with c/o stiffness in neck and weakness in L hand and arm s/p neck surgery. Constant numbness in ant and post aspects of all fingers. Pain       Patient reports pain is  3-4/10 pain at present and  6/10 pain at its worst.  Pt. reports pain with coughing, sneezing and laughing:   []Yes   [x]No    []NA     Current Functional Limitations:   [x]Yes   []No  Functional Complaints:    Turning head, looking up and down, lifting  PLOF:   [x]No functional limitations prior to accident   []Pre-exisiting limitations:  Pt's sleep is affected?    [x]Yes wakes up with neck pain   []No         Patient goal for therapy:  \"to get rid of neck pain\"      OBJECTIVE FINDINGS      Posture: [] completed next visit     Measure Used:   Score:     ASSESSMENT   Pt presents with problems below. Pt with expected findings s/p cervical fusion. Pt should do well with PT to improve functional status and decrease pain. Problems          [x]Decreased cervical/thoracic ROM  []Decreased UE ROM  [x]Decreased strength  [x]Positive neurological findings  [x]Decreased joint mobility  [x]Increased pain  [x]Decreased flexibility  [x]Poor posture/alignment  [x]Decreased functional status     Rehabilitation Potential:  Good for goals listed below.     Strengths for achieving goals include:  [x]Pt motivated   [x]PLOF   []Family support   Limitations for achieving goals include:  [x] None  []Pain []Severity of condition     []Cognitive   []Lack of family support   []Lack of resources     []Other:         Prognosis:   [x]Good   []Fair   []Poor    GOALS    Short Term Goals:   3  weeks MET Not Met Long Term Goals:   6  weeks MET Not Met   Establish HEP [] [] Pt independent with HEP [] []   Pain  4/10 or less [] [] Pain  2/10 or less [] []   Increase ROM 5 degrees and rotation by 25% [] [] Achieve WFL ROM  [] []   Increase identified strength deficits 1/3 grade [] [] Increase strength to 5/5  [] []   Assess FOM and make LTG [] [] FOM goal TBD [] []    [] [] Increase average  strength L hand by 10# [] []     PLAN OF CARE     To see patient   2 x/week for  6 weeks for the following treatment interventions:    [x]Therapeutic Exercise  [x]Modalities of Choice: [x]Heat [x]Cold [x]US [x]Estim []Ionto []Vaso-pneumatic device  []Mechanical Traction   [x]Manual Therapy  [x]Neuromuscular Re-Education  []Gait Training   []Therapeutic Activity  []Other       Timed Code Treatment Minutes:   25  Minutes  (see daily note for details)   Total Treatment Time:   50 minutes    Plan of care sent to provider:      [x]Faxed  []Co-signature    (attempts: 1[x] 2 []3[])         Medicare Cap total YTD:   [x]N/A  Workers Comp Time Stamp  []N/A   Time In: 300 PM   Time Out: 350PM      Thank you for your referral of this patient.      Bernadette Mujica PT, OMT-C, [id number]

## 2020-09-02 NOTE — PROGRESS NOTES
Outpatient Physical Therapy     [x] Daily Treatment Note   [] Progress Note   [x] Discharge Note (for shoulder only)    Date:  9/2/2020    Patient Name:  Lotus Amin         YOB: 1955    Medical Diagnosis: unspecified disorder of vestibular system H81.90 ; ORIF clavicle, I S42.022K displaced fx shaft of L clavicle                                                    Treatment Diagnosis:  Imbalance, dizziness/vertigo ;  L shoulder pain, decreased mobility, weakness          Onset Date:    2/14/2019             Referral Date:  7/17/20 (vestibular)  / 7/13/20 (shoulder)              Referring Physician:  Gil Hernández (vestibular) / Dr. Isidra Richards (shoulder)                                                    Visits Allowed/Insurance/Certification Information:  Vestibular [facility], 16 visits approved through 9/25/20  / shoulder [facility], 12 visits approved until 8/21/20     Restrictions/Precautions: HTN, DM, hx of upper cervical fractures, recent neck surgery 6/2020     Plan of care sent to provider Ginny Campa):      [x]Faxed  []Co-signature    (attempts: 1[x] 2 []3[])      Plan of care signed:      [x]Yes date: 7/27/20              Plan of care sent to provider Maikel Merino):      [x]Faxed  []Co-signature    (attempts: 1[x] 2 []3[])     Plan of care signed:      [x]Yes date:   7/23/20      Plan of care sent to provider Didi Wesley):      [x]Faxed 9/2/20  []Co-signature    (attempts: 1[x] 2 []3[])     Plan of care signed:      []Yes date:     [x]No         Progress Note covers period from (if applicable):    []NA    [x]From 8/12/20 to 9/2/20 (shoulder)           Next Progress Note due:   9/16/20 for vestibular diagnosis    Visit# / total visits:  Vestibular 13/16  Cervical  1/12     Plan for Next Session:     Shoulder joint mobilization, PROM, progress ROM and strength saida scapula   Progress LE strength and flexibility   Progress balance activities    Progress eye exercises   Progress habituation exs as needed   Progress gait and steps as able   Progress gaze stability exs      Subjective:   Shoulder:  Pt states his shoulder is ok but still gets a little pain. Pt will get cramps/spasms after the exercises or trying to use it which subsides after rest.      Vestibular: Rolling exercises have helped. Feels maybe 85% better with that. Supine to sit still makes him pretty dizzy. Not like he was before and does not have to hold on to the bed because things are moving, but it still does make him very dizzy. States he did the exercises approx 5 times. States he did not do much this weekend, denies being outside (noticibly sun tanned). Pain level: 0/10    AT EVAL:   Patient reports L shoulder pain is  0/10 pain at present and  8/10 pain at its worst.  Pt also reports pain from cervical spine that radiates to L trap and goes to L elbow, pt reports constant numbness ventral fingers L hand and weakness into L hand. Also neck  pain,                                      4/10 pain at present   6/10 pain at its worst      Objective:         Exercises:    Exercises in bold performed in department today. Items not bolded are carried forward from prior visits for continuity of the record. Exercise/Equipment Resistance/Repetitions HEP Other comments       []      L Shoulder Diagnosis:   []       pendulums 20x CW and 20x CCW [x] Reviewed       flexion supine x10 [x] Advised to progress into newly obtained range      ER with cane at 90 deg abd 10 x 10 sec [x] Reviewed and corrected. Tolerating well. Sleeper stretch 10 x 10 sec [x] Reviewed and corrected form       Serratus ceilng punch         Mid rows (seated ok for now) Red, 1x10 [x] Progressed to blue band      Serratus punch  5#, 5x12 (pt did more than asked) [x]       Triceps ext supinea 5# 3x12-15        Prone shoulder extension to hip with scap retraction  5#, 3x15 [x] Cues to maintain retraction, to reduce crepitus with extension.   Advanced to 5 lbs to check form because pt states he is using 5 lbs at home. Prone row  5#, 3x15  [x]   Crepitus noted in scapula     Prone horizontal abduction  With scap retraction 3#, 3X15  [x] Unable to maintain form with 5#      Prone scaption  with arm off edge of bed (prone Y with arm off edge) 0#, 3x15   [x]   Cues to keep thumb up      SL ER  2# 2X10 [] With scapular retraction      Shoulder IR with tband Green, 3x12  [x]        Wall push ups  x10  [x]   Significant winging of scapula noted. Wall push up with serratus plus 2x10  Noted significant winging of scapula     Rhythmic stabilization 3x30 sec, distal perturbations []      PNF D1 and D2 UE patterns in   standing                                                          2x10 each   [] Gentle assist to achieve full ROM     Supine active flexion  3# 1x10     Standing Abduction  2x10     Standing  Forward flexion  2x10      Manually resisted scap retraction in SL 2x10, mod resistance  Much crepitus noted   Standing wall slide  2x10     SL HAB (LUE) x10  1# x10  Cuing to incr eccentric control and maintaining scapular retraction with eccentric lowering     Vestibular Diagnosis:    []      Eye Exercises:         Saccades, seated  Pt inst to do this exercise x 1 minute in horizontal direction and x 1 minute in vertical direction. [x]   Reviewed, continue     Corrective saccades, seated Pt inst to do this exercise x 2 minute in horizontal direction and x 2 minute in vertical direction  [x]   Reviewed, continue     VOR x 1 viewing, standing, close target Pt inst to do this exercise x 1 minute with horizontal head turns and x 1 minute with vertical head nods. Pt to only go as fast as he/she can while keeping target clear and in focus. Pt inst to do this ex 3x/day.      19, 19, 18 reps/min Horizontal   (23, 24, 26   Last visit)      26, 25 reps/min vertical (24,  24 last visit)         VOR x 1 viewing, standing, far target  Pt inst to do this exercise x 1 minute with horizontal head turns and x 1 minute with vertical head nods. Pt to only go as fast as he/she can while keeping target clear and in focus. Pt inst to do this ex 3x/day. 19 reps Horizontal  23 reps vertical      VOR Checkerboard Able to keep in focus today   Horizontal  1x5  Vertical     1x5   Unable to keep in focus after first set. .        Habituation Exercises:         Rolling B  5 repetitions (Rolling R, center, rolling L, center)   Pt to do 2 times per day  [x]   Pt states this is better, just a little bit of dizziness but notices more dizziness with rolling at night when eyes are closed. So PT inst pt to do this ex during the day with eyes closed. Reviewed, continue   supine to longsit and vice versa  5 repetitions (sit up, lie back down)   Pt to do 2 times per day  Pt states he is still dizzy with this, Reviewed, continue   Modified Lorenzo Daroff  x10  Limited by cervical ROM   PNF Wall taps in standing 2x10             scifit  Seat 8, with arms Level 4.0, 10 minutes   At end of session       Bridges  2x15 [x]      Standing heel raises 2x15 [x]      Standing ABD  2#, 2x10 B []      Step ups 8\" 2x10 B  [] Single UE     Step ups onto 6\" step, blue foam 2x20 []      Static stance on Blue foam  Conditions   Pt tends to lean posteriorly.      March in place on blue foam  2x20   Single R UE    Static Stance on firm surface ;   Repeated On blue foam Feet apart: conditions 1-6  Feet together: conditions 1-6    In corner    Step up and over, 8\" step  Lateral step up and over 6\" step 1x10 B UE; 1x10 L UE alternating pattern  1x10 BUE; 1x10 RUE    Single UE for balance   Lateral eccentric step down, 4\" step 2x10 B  Bilateral UE for balance   Lateral cone step over at // bars 2x  Bilateral UE   Step over cone, forward @ // bars  3x B  B UE, Single UE, No UE          Cervical Diagnosis:        cervical AROM  As tolerated      gripping stress ball  Will get pt theraputty NV Performed Cervical Evaluation today: 25 minutes (1u) ([id number])    Therapeutic Exercise/Home Exercise Program:   45 minutes (5U) ([id number] and then again from 1525- 1550 for cervical treatment)    Cervical treatment:  Pt inst in role of PT, prognosis, plan of care, use of CP/HP, activity modification, and benefits of therapy. Pt educated on anatomy, goals  HEP has been established, See above    Final assessment of shoulder (was overlooked last visit, this PT on vacation)  UE Range of Motion/Strength Testing      [x]? ? All strength WFL (5/5) except as marked below                        Range Tested MMT / Resisted PROM AROM Comments   *denotes pain Left Right Left Right Left Right     Shoulder Flexion 4+   170 170 130 150     Shoulder Extension 5       70 65     Shoulder Abduction  C5 4+   135 126 120 160     Shoulder Adduction  5               Shoulder IR 5   65 @ 90 27 @ 90 T10 T12     Shoulder ER 5   80 @ 90 90 @ 90 C7-T1 T4     Elbow Flexion  C6 5               Elbow Extension C7 5                     HEP has been established, See above,  Reviewed and progressed, Kae updated   Pt inst to do UE and LE strength and ROM exs ONLY 1 TIME PER DAY. Access Code: [email or web address]   URL: [employer]/   Date: 09/02/2020   Prepared by: Deion Wills     Exercises   Circular Shoulder Pendulum with Table Support - 20 reps - 2 sets - 2x daily - 7x weekly   Supine Shoulder Flexion AAROM - 10 reps - 10 hold - 2x daily - 7x weekly   Supine Shoulder External Internal Rotation AAROM with Dowel - 10 reps - 10 hold - 2x daily - 7x weekly   Sleeper Stretch - 10 reps - 10 hold - 2x daily - 7x weekly   Standing Shoulder Row with Anchored Resistance - 10-15 reps - 3 sets - 1x daily - 7x weekly   Bridge - 10-15 reps - 3 sets - 1x daily - 7x weekly   Heel rises with counter support - 10-15 reps - 3 sets - 1x daily - 7x weekly   Supine Single Arm Shoulder Protraction - 10-15 reps - 3 sets - 1x daily - 7x weekly   Prone Shoulder Extension - Single Arm - 10-15 reps - 3 sets - 1x daily - 7x weekly   Supine Triceps Extension with Resistance - 10 reps - 3 sets - 1x daily - 7x weekly   Supine Elbow Flexion Extension with Dumbbell - 10-15 reps - 3 sets - 1x daily - 7x weekly   Sidelying Shoulder ER with Towel and Dumbbell - 10-15 reps - 3 sets - 1x daily - 7x weekly   Prone Shoulder Row - 10-15 reps - 3 sets - 1x daily - 7x weekly   Shoulder Internal Rotation with Resistance - 10-15 reps - 3 sets - 1x daily - 7x weekly   Prone Single Arm Shoulder Horizontal Abduction with Dumbbell - Palm Down - 10-15 reps - 3 sets - 1x daily - 7x weekly   Wall Push Up - 10-15 reps - 3 sets - 1x daily - 7x weekly   Prone Single Arm Shoulder Y with Dumbbell - 10-15 reps - 3 sets - 1x daily - 7x weekly   Supine Shoulder Flexion with Free Weight - 10 reps - 3 sets - 1x daily - 7x weekly   standing shoulder flexion - 10 reps - 3 sets - 1x daily - 7x weekly   Standing Alternating Shoulder Abduction - 10 reps - 3 sets - 1x daily - 7x weekly         Therapeutic Activity:  0 minutes      Gait: 0 minutes      Neuromuscular Re-Education:  0 minutes   Continue Eye exercises, see above  Continue habituation exercises, see above  Continue gaze stability exs, see above. Canalith Repositioning Procedure:  0 minutes    Manual Therapy:  0 Minutes     Modalities: 0 minutes    Functional Outcome Measure:     Measure Used: Dizziness Handicap Inventory   Score: 46/100  Date Assessed: 7/22/20     Measure Used: Dizziness Handicap Inventory   Score: 60/100  Date Assessed: 8/12/20    Assessment/Treatment/Activity Tolerance:    Pt has made progress with PT for shoulder diagnosis. Pt can cont HEP at this time and should continue to progress strength. Pt is independent with HEP for shoulder.    Patients response to treatment:   [x]Patient tolerated treatment well []Patient limited by fatigue   []Patient limited by pain  []Patient limited by other medical

## 2020-09-09 ENCOUNTER — HOSPITAL ENCOUNTER (OUTPATIENT)
Dept: PHYSICAL THERAPY | Age: 65
Setting detail: THERAPIES SERIES
Discharge: HOME | End: 2020-09-09
Payer: COMMERCIAL

## 2020-09-09 PROCEDURE — 97116 GAIT TRAINING THERAPY: CPT

## 2020-09-09 PROCEDURE — 97112 NEUROMUSCULAR REEDUCATION: CPT

## 2020-09-09 PROCEDURE — 97140 MANUAL THERAPY 1/> REGIONS: CPT

## 2020-09-09 NOTE — FLOWSHEET NOTE
Outpatient Physical Therapy     [x] Daily Treatment Note   [] Progress Note   [] Discharge Note     Date:  9/9/2020    Patient Name:  Carline Cai         YOB: 1955    Medical Diagnosis: unspecified disorder of vestibular system H81.90 ; ORIF clavicle, I S42.022K displaced fx shaft of L clavicle                                                    Treatment Diagnosis:  Imbalance, dizziness/vertigo ;  L shoulder pain, decreased mobility, weakness          Onset Date:    2/14/2019             Referral Date:  7/17/20 (vestibular)  / 7/13/20 (shoulder)              Referring Physician:  Telly Clayton (vestibular) / Dr. Francis Monroe (shoulder)                                                    Visits Allowed/Insurance/Certification Information:  Vestibular [facility], 16 visits approved through 9/25/20  / shoulder [facility], 12 visits approved until 8/21/20     Restrictions/Precautions: HTN, DM, hx of upper cervical fractures, recent neck surgery 6/2020     Plan of care sent to provider Lam Jacob):      [x]Faxed  []Co-signature    (attempts: 1[x] 2 []3[])      Plan of care signed:      [x]Yes date: 7/27/20              Plan of care sent to provider Courtney Mullins):      [x]Faxed  []Co-signature    (attempts: 1[x] 2 []3[])     Plan of care signed:      [x]Yes date:   7/23/20      Plan of care sent to provider [facility] fax # [phone]):      [x]Faxed 9/2/20  []Co-signature    (attempts: 1[x] 2 []3[])     Plan of care signed:      []Yes date:     [x]No         Progress Note covers period from (if applicable):    [x]NA    []From 9/2/20            Next Progress Note due:   9/16/20 for vestibular diagnosis    Visit# / total visits:  Vestibular 14/16  Cervical  2/12     Plan for Next Session:     Progress LE strength and flexibility   Progress balance activities    Progress eye exercises/gaze stability exs   Progress habituation exs as needed   Progress gait and steps as able   Manual therapy to cervical/thoracic/ribs with caution to C5-6 fusion and upper cervical healed fractures   Progress cervical strength, and L arm/ strength    Subjective:   Pt states his dizziness is better with rolling (still there, but better) but still notices dizziness with sit to/from supine, bending over, coming up from a squat. Always states it is brief, lasting less than 10 seconds usually. No new complaints  States he does not use his cane when in the home because he has stuff to hold onto. Pain level:  3-4/10 cervical, pain as high as 4/10 in past few days     AT EVAL:   Patient reports L shoulder pain is  0/10 pain at present and  8/10 pain at its worst.  Pt also reports pain from cervical spine that radiates to L trap and goes to L elbow, pt reports constant numbness ventral fingers L hand and weakness into L hand. Also neck  pain,                                      4/10 pain at present   6/10 pain at its worst      Objective:     Operation:    1. C5/C6 anterior cervical discectomies and foraminotomies. 2. C5/C6 anterior cervical discectomy and fusion with [employer] Interbody cage and plating system. 3. Use of intraoperative microscope  4. Use of intraoperative fluoroscopy      Exercises:    Exercises in bold performed in department today. Items not bolded are carried forward from prior visits for continuity of the record. Exercise/Equipment Resistance/Repetitions HEP Other comments       []      L Shoulder Diagnosis:   []       pendulums 20x CW and 20x CCW [x] Reviewed       flexion supine x10 [x] Advised to progress into newly obtained range      ER with cane at 90 deg abd 10 x 10 sec [x] Reviewed and corrected. Tolerating well.       Sleeper stretch 10 x 10 sec [x] Reviewed and corrected form       Serratus ceilng punch         Mid rows (seated ok for now) Red, 1x10 [x] Progressed to blue band      Serratus punch  5#, 5x12 (pt did more than asked) [x]       Triceps ext supinea 5# 3x12-15 Prone shoulder extension to hip with scap retraction  5#, 3x15 [x] Cues to maintain retraction, to reduce crepitus with extension. Advanced to 5 lbs to check form because pt states he is using 5 lbs at home. Prone row  5#, 3x15  [x]   Crepitus noted in scapula     Prone horizontal abduction  With scap retraction 3#, 3X15  [x] Unable to maintain form with 5#      Prone scaption  with arm off edge of bed (prone Y with arm off edge) 0#, 3x15   [x]   Cues to keep thumb up      SL ER  2# 2X10 [] With scapular retraction      Shoulder IR with tband Green, 3x12  [x]        Wall push ups  x10  [x]   Significant winging of scapula noted. Wall push up with serratus plus 2x10  Noted significant winging of scapula     Rhythmic stabilization 3x30 sec, distal perturbations []      PNF D1 and D2 UE patterns in   standing                                                          2x10 each   [] Gentle assist to achieve full ROM     Supine active flexion  3# 1x10     Standing Abduction  2x10     Standing  Forward flexion  2x10      Manually resisted scap retraction in SL 2x10, mod resistance  Much crepitus noted   Standing wall slide  2x10     SL HAB (LUE) x10  1# x10  Cuing to incr eccentric control and maintaining scapular retraction with eccentric lowering     Vestibular Diagnosis:    []      Eye Exercises:         Saccades, seated  Pt inst to do this exercise x 1 minute in horizontal direction and x 1 minute in vertical direction. [x]   Reviewed, continue     Corrective saccades, seated, week 2 Pt inst to do this exercise x 2 minute in horizontal direction and x 2 minute in vertical direction, 2x/day  [x]   Reviewed, continue     VOR x 1 viewing, standing, close target Pt inst to do this exercise x 1 minute with horizontal head turns and x 1 minute with vertical head nods. Pt to only go as fast as he/she can while keeping target clear and in focus. Pt inst to do this ex 3x/day.           Tested today:  25 reps/min Horizontal       30 reps/min vertical          VOR x 1 viewing, standing, far target, week 2 Pt inst to do this exercise x 1 minute with horizontal head turns and x 1 minute with vertical head nods. Pt to only go as fast as he/she can while keeping target clear and in focus. Pt inst to do this ex 2x/day. NT today reps Horizontal  NT today reps vertical      VOR Checkerboard, week 4 close target Pt to cont to practice to keep in focus     VOR x 2 viewing, week 4 Initiated, 1 min each direction standing        Habituation Exercises:         Rolling B  5 repetitions (Rolling R, center, rolling L, center)   Pt to do 2 times per day  [x]   Pt states this is better, just a little bit of dizziness but notices more dizziness with rolling at night when eyes are closed. So PT inst pt to do this ex during the day with eyes closed. Reviewed, continue   supine to longsit and vice versa  5 repetitions (sit up, lie back down)   Pt to do 2 times per day  Pt states he is still dizzy with this, Reviewed, continue, pt couldn't remember this es   Modified Bj Mcginnis  x10  Pt states he was doing this one but PT had inst pt to focus on supint to sit  Limited by cervical ROM   PNF Wall taps in standing 2x10       scifit  Seat 8, with arms Level 4.0, 10 minutes   At end of session       Bridges  2x15 [x]      Standing heel raises 2x15 [x]      Standing ABD  2#, 2x10 B []      Step ups 8\" 2x10 B  [] Single UE     Step ups onto 6\" step, blue foam 2x20 []      Static stance on Blue foam  Conditions   Pt tends to lean posteriorly.      March in place on blue foam  2x20   Single R UE    Static Stance on firm surface ;   Repeated On blue foam Feet apart: conditions 1-6  Feet together: conditions 1-6    In corner    Step up and over, 8\" step  Lateral step up and over 6\" step 1x10 B UE; 1x10 L UE alternating pattern  1x10 BUE; 1x10 RUE    Single UE for balance   Lateral eccentric step down, 4\" step 2x10 B  Bilateral UE for balance   Lateral cone step over at // bars 2x  Bilateral UE   Step over cone, forward @ // bars  3x B  B UE, Single UE, No UE          Cervical Diagnosis:        cervical AROM  As tolerated      gripping stress ball  As tolerated      Theraputty:          Douglas hunt 10 beans  Green putty         squeeze and rotate putty   \"         Roll out donut and spread with                fingers   \"         Roll out and pinch with fingers   \"       Therapeutic Exercise/Home Exercise Program:   5 minutes (0units) ([id number])  HEP has been established, See above under cervical dx,  Reviewed and progressed  Pt inst to do UE and LE strength and ROM exs ONLY 1 TIME PER DAY. Access Code: [email or web address]   URL: [email or web address]   Date: 09/02/2020   Prepared by: Puneet Abebe     Exercises   Circular Shoulder Pendulum with Table Support - 20 reps - 2 sets - 2x daily - 7x weekly   Supine Shoulder Flexion AAROM - 10 reps - 10 hold - 2x daily - 7x weekly   Supine Shoulder External Internal Rotation AAROM with Dowel - 10 reps - 10 hold - 2x daily - 7x weekly   Sleeper Stretch - 10 reps - 10 hold - 2x daily - 7x weekly   Standing Shoulder Row with Anchored Resistance - 10-15 reps - 3 sets - 1x daily - 7x weekly   Bridge - 10-15 reps - 3 sets - 1x daily - 7x weekly   Heel rises with counter support - 10-15 reps - 3 sets - 1x daily - 7x weekly   Supine Single Arm Shoulder Protraction - 10-15 reps - 3 sets - 1x daily - 7x weekly   Prone Shoulder Extension - Single Arm - 10-15 reps - 3 sets - 1x daily - 7x weekly   Supine Triceps Extension with Resistance - 10 reps - 3 sets - 1x daily - 7x weekly   Supine Elbow Flexion Extension with Dumbbell - 10-15 reps - 3 sets - 1x daily - 7x weekly   Sidelying Shoulder ER with Towel and Dumbbell - 10-15 reps - 3 sets - 1x daily - 7x weekly   Prone Shoulder Row - 10-15 reps - 3 sets - 1x daily - 7x weekly   Shoulder Internal Rotation with Resistance - 10-15 reps - 3 sets - 1x daily - 7x weekly Prone Single Arm Shoulder Horizontal Abduction with Dumbbell - Palm Down - 10-15 reps - 3 sets - 1x daily - 7x weekly   Wall Push Up - 10-15 reps - 3 sets - 1x daily - 7x weekly   Prone Single Arm Shoulder Y with Dumbbell - 10-15 reps - 3 sets - 1x daily - 7x weekly   Supine Shoulder Flexion with Free Weight - 10 reps - 3 sets - 1x daily - 7x weekly   standing shoulder flexion - 10 reps - 3 sets - 1x daily - 7x weekly   Standing Alternating Shoulder Abduction - 10 reps - 3 sets - 1x daily - 7x weekly         Therapeutic Activity:  0 minutes      Gait: 20 minutes  [id number] (1unit)  Dynamic Gait Exercises:    Amb without cane, 60 ft , slow vinita, SBA    Amb without cane, Head turns L/R, 60 ft , SBA. MIN stagger. Amb without cane, Head nods Up/Dn, 60 ft, SBA  MOD stagger. Amb with cues for rapid vinita and then slow vinita, alternating, 60 ft, SBA. Amb with Hip Flexion High steps 60 ft x2  SBA. Neuromuscular Re-Education:  45 minutes  [id number] (3units)  Continue Eye exercises, see above  Continue habituation exercises, see above  Continue gaze stability exs, see above. Canalith Repositioning Procedure:  0 minutes    Manual Therapy:  25 Minutes  [id number] (2units)  Prone thoracic PAs  Prone costotransverse springing B  Upper thoracic PAs/prayer hands  2nd rib mob B  Supine 1st rib mob B     Modalities: 0 minutes    Functional Outcome Measure:     Measure Used: Dizziness Handicap Inventory   Score: 46/100  Date Assessed: 7/22/20     Measure Used: Dizziness Handicap Inventory   Score: 60/100  Date Assessed: 8/12/20    Assessment/Treatment/Activity Tolerance:      Patients response to treatment:   [x]Patient tolerated treatment well []Patient limited by fatigue   []Patient limited by pain  []Patient limited by other medical complications   []Other:     Goals:     Vestibular GOALS established 7/22/20  Short Term Goals:   4 weeks MET NOT MET Long Term Goals:  8  weeks MET NOT MET   1). Establish HEP [x]?  []?  1). Pt independent HEP []?  []?    2). Increase strength 1/3 grade in areas of deficits [x]?  []?  2). Increase strength to 4+/5 or better [x]?  []?    3). Improve Tinetti to 19/20 [x]?  []?  3). Improve Tinetti to 24/28 []?  []?    4). Improve DHI score to 36 or less  []?  []?  4). Improve DHI score to 26 or less []?  []?    5). Pt able to ambulate household distances without AD []?  []?  5). Pt able to do flight of steps with reciprocal pattern with 1 rail  []?  []?    6). []?  []?  6). Pt able to ambulate community distances without AD  []?  []?        Shoulder  GOALS   Discharged 9/2/20  Short Term Goals:   3  weeks MET Not MET Long Term Goals:    6 weeks MET Not Met   Establish HEP [x]?  []?  Pt independent with HEP [x]?  []?    Pain  5/10 or less [x]?  []?  Pain  3/10 or less [x]?  []?    Increase identified strength deficits 1/3 grade  [x]?  []?  Increase strength to 4+/5 or greater [x]?  []?    Increase UE PROM 20 degrees with IR and ER  And 10 degrees with abduction and flexion  [x]?  []?  Achieve WFL AROM  [x]?  []?    Report increased tolerance to ADLs without increased pain  [x]?  []?  Return to all ADLS without limitation []?  [x]?      []?  []?    []?  []? Cervical GOALS  established 9/2/20   Short Term Goals:   3  weeks MET Not Met Long Term Goals:   6  weeks MET Not Met   Establish HEP []?  []?  Pt independent with HEP []?  []?    Pain  4/10 or less []?  []?  Pain  2/10 or less []?  []?    Increase ROM 5 degrees and rotation by 25% []?  []?  Achieve WFL ROM  []?  []?    Increase identified strength deficits 1/3 grade []?  []?  Increase strength to 5/5  []?  []?    Assess FOM and make LTG []?  []?  FOM goal TBD []?  []?      []?  []?  Increase average  strength L hand by 10# []?         Prognosis: [x]Good   []Fair   []Poor    Patient Requires Follow-up:  [x]Yes  []No    Plan: []Plan of care initiated    [x]Continue per plan of care for vestibular and cervical

## 2020-09-14 ENCOUNTER — HOSPITAL ENCOUNTER (OUTPATIENT)
Dept: PHYSICAL THERAPY | Age: 65
Setting detail: THERAPIES SERIES
Discharge: HOME | End: 2020-09-14
Payer: COMMERCIAL

## 2020-09-14 PROCEDURE — 97116 GAIT TRAINING THERAPY: CPT

## 2020-09-14 PROCEDURE — 97140 MANUAL THERAPY 1/> REGIONS: CPT

## 2020-09-14 PROCEDURE — 97110 THERAPEUTIC EXERCISES: CPT

## 2020-09-14 PROCEDURE — 97112 NEUROMUSCULAR REEDUCATION: CPT

## 2020-09-14 NOTE — FLOWSHEET NOTE
fractures   Progress cervical strength, and L arm/ strength    Subjective:   Pt states he was fine after last treatment but was sore the next day in first rib area. States he notices weakness in L wrist with trying some weights at home. No complaints with hand exs   Pt states he gets dizzy with rolling (only slight), sit to/from supine, and bending over. Pain level:  3/10 cervical tension, pain as high as 4/10 in past few days     AT EVAL:   Patient reports L shoulder pain is  0/10 pain at present and  8/10 pain at its worst.  Pt also reports pain from cervical spine that radiates to L trap and goes to L elbow, pt reports constant numbness ventral fingers L hand and weakness into L hand. Also neck  pain,                                      4/10 pain at present   6/10 pain at its worst      Objective:     Operation:    1. C5/C6 anterior cervical discectomies and foraminotomies. 2. C5/C6 anterior cervical discectomy and fusion with [employer] Interbody cage and plating system. 3. Use of intraoperative microscope  4. Use of intraoperative fluoroscopy      Exercises:    Exercises in bold performed in department today. Items not bolded are carried forward from prior visits for continuity of the record. Exercise/Equipment Resistance/Repetitions HEP Other comments       []      L Shoulder Diagnosis:   []       pendulums 20x CW and 20x CCW [x] Reviewed       flexion supine x10 [x] Advised to progress into newly obtained range      ER with cane at 90 deg abd 10 x 10 sec [x] Reviewed and corrected. Tolerating well.       Sleeper stretch 10 x 10 sec [x] Reviewed and corrected form       Serratus ceilng punch         Mid rows (seated ok for now) Red, 1x10 [x] Progressed to blue band      Serratus punch  5#, 5x12 (pt did more than asked) [x]       Triceps ext supinea 5# 3x12-15        Prone shoulder extension to hip with scap retraction  5#, 3x15 [x] Cues to maintain retraction, to reduce crepitus with extension. Advanced to 5 lbs to check form because pt states he is using 5 lbs at home. Prone row  5#, 3x15  [x]   Crepitus noted in scapula     Prone horizontal abduction  With scap retraction 3#, 3X15  [x] Unable to maintain form with 5#      Prone scaption  with arm off edge of bed (prone Y with arm off edge) 0#, 3x15   [x]   Cues to keep thumb up      SL ER  2# 2X10 [] With scapular retraction      Shoulder IR with tband Green, 3x12  [x]        Wall push ups  x10  [x]   Significant winging of scapula noted. Wall push up with serratus plus 2x10  Noted significant winging of scapula     Rhythmic stabilization 3x30 sec, distal perturbations []      PNF D1 and D2 UE patterns in   standing                                                          2x10 each   [] Gentle assist to achieve full ROM     Supine active flexion  3# 1x10     Standing Abduction  2x10     Standing  Forward flexion  2x10      Manually resisted scap retraction in SL 2x10, mod resistance  Much crepitus noted   Standing wall slide  2x10     SL HAB (LUE) x10  1# x10  Cuing to incr eccentric control and maintaining scapular retraction with eccentric lowering     Vestibular Diagnosis:    []      Eye Exercises:         Saccades, seated  Pt inst to do this exercise x 1 minute in horizontal direction and x 1 minute in vertical direction. [x]   Reviewed, continue     Corrective saccades, seated, week 2 Pt inst to do this exercise x 2 minute in horizontal direction and x 2 minute in vertical direction, 2x/day  [x]   Reviewed, continue     VOR x 1 viewing, standing, close target Pt inst to do this exercise x 1 minute with horizontal head turns and x 1 minute with vertical head nods. Pt to only go as fast as he/she can while keeping target clear and in focus. Pt inst to do this ex 3x/day.          25 reps/min Horizontal       30 reps/min vertical          VOR x 1 viewing, standing, far target, week 2 Pt inst to do this exercise x 1 minute with horizontal head turns and x 1 minute with vertical head nods. Pt to only go as fast as he/she can while keeping target clear and in focus. Pt inst to do this ex 2x/day. NT today reps Horizontal  NT today reps vertical      VOR Checkerboard, week 4 close target Pt to cont to practice to keep in focus  Continue   VOR x 2 viewing, week 4 1 min each direction standing   continue     Habituation Exercises:         Rolling B - pt states he has not been doing this ex even though PT has been asking him about it each visit and he states he is still dizzy with exercise. Reviewed, 5 repetitions (Rolling R, center, rolling L, center)   Pt to do 2 times per day  [x]   Pt to do some with eyes open and some with eyes closed. supine to longsit and vice versa- pt states he has been doing this one but needed cues  Reviewed, 5 repetitions (sit up, lie back down)   Pt to do 2 times per day  Pt states he is still dizzy with this, Reviewed, continue   Nose to knee5 repetitions (nose to R knee, back up, nose to L knee and back up), 2x/day. Added today     Discontinued    PNF Wall taps in standing 2x10       scifit  Seat 8, with arms Level 4.0, 10 minutes   At end of session       Bridges  2x15 [x]      Standing heel raises 2x15 [x]      Standing ABD  2#, 2x10 B []      Step ups 8\" 2x10 B  [] Single UE     Step ups onto 6\" step, blue foam 2x20 []      Static stance on Blue foam  Conditions   Pt tends to lean posteriorly.      March in place on blue foam  2x20   Single R UE    Static Stance on firm surface ;   Repeated On blue foam Feet apart: conditions 1-6  Feet together: conditions 1-6    In corner    Step up and over, 8\" step  Lateral step up and over 6\" step 1x10 B UE; 1x10 L UE alternating pattern  1x10 BUE; 1x10 RUE    Single UE for balance   Lateral eccentric step down, 4\" step 2x10 B  Bilateral UE for balance   Lateral cone step over at // bars 2x  Bilateral UE   Step over cone, forward @ // bars  3x B  B UE, Single UE, No UE          Cervical Diagnosis:        cervical AROM  As tolerated      gripping stress ball  As tolerated      Theraputty:    [x]         Callahan hunt 10 beans  Green putty         squeeze and rotate putty   \"         Roll out donut and spread with                fingers   \"         Roll out and pinch with fingers   \"         Wrist flexion curls 3# 3x10  [x]          Wrist extension curls  2# 3x10  [x]          Supination/pronation with elbow at side  3# 3x10  [x]                    Therapeutic Exercise/Home Exercise Program:   15 minutes (1units) ([id number])  HEP has been established, See above under cervical dx,  Reviewed and progressed  Pt inst to do UE and LE strength and ROM exs ONLY 1 TIME PER DAY. Access Code: [email or web address]   URL: [employer]/   Date: 09/02/2020   Prepared by: Baylee Fast     Exercises   Circular Shoulder Pendulum with Table Support - 20 reps - 2 sets - 2x daily - 7x weekly   Supine Shoulder Flexion AAROM - 10 reps - 10 hold - 2x daily - 7x weekly   Supine Shoulder External Internal Rotation AAROM with Dowel - 10 reps - 10 hold - 2x daily - 7x weekly   Sleeper Stretch - 10 reps - 10 hold - 2x daily - 7x weekly   Standing Shoulder Row with Anchored Resistance - 10-15 reps - 3 sets - 1x daily - 7x weekly   Bridge - 10-15 reps - 3 sets - 1x daily - 7x weekly   Heel rises with counter support - 10-15 reps - 3 sets - 1x daily - 7x weekly   Supine Single Arm Shoulder Protraction - 10-15 reps - 3 sets - 1x daily - 7x weekly   Prone Shoulder Extension - Single Arm - 10-15 reps - 3 sets - 1x daily - 7x weekly   Supine Triceps Extension with Resistance - 10 reps - 3 sets - 1x daily - 7x weekly   Supine Elbow Flexion Extension with Dumbbell - 10-15 reps - 3 sets - 1x daily - 7x weekly   Sidelying Shoulder ER with Towel and Dumbbell - 10-15 reps - 3 sets - 1x daily - 7x weekly   Prone Shoulder Row - 10-15 reps - 3 sets - 1x daily - 7x weekly   Shoulder Internal Rotation with Resistance - 10-15 reps - 3 sets - 1x daily - 7x weekly   Prone Single Arm Shoulder Horizontal Abduction with Dumbbell - Palm Down - 10-15 reps - 3 sets - 1x daily - 7x weekly   Wall Push Up - 10-15 reps - 3 sets - 1x daily - 7x weekly   Prone Single Arm Shoulder Y with Dumbbell - 10-15 reps - 3 sets - 1x daily - 7x weekly   Supine Shoulder Flexion with Free Weight - 10 reps - 3 sets - 1x daily - 7x weekly   standing shoulder flexion - 10 reps - 3 sets - 1x daily - 7x weekly   Standing Alternating Shoulder Abduction - 10 reps - 3 sets - 1x daily - 7x weekly         Therapeutic Activity:  0 minutes      Gait: 15 minutes  [id number] (1unit)  Dynamic Gait Exercises:    Amb without cane, 60 ft x 2 , slow/fast vinita, SBA    Amb without cane, Head turns L/R, 60 ft x2, SBA. MIN occasional stagger. Amb without cane, Head nods Up/Dn, 60 ft x 2, SBA  MIN occasional stagger. Amb with cues for rapid turn and stop, 6 reps, alternating, SBA. Amb with quick stop and starts, 60 ft x 2, SBA    Amb with Hip Flexion (high marches) with outstretched arms 60 ft x2  SBA. Neuromuscular Re-Education:  40 minutes  2205-1466 (2units + nustep)  Continue Eye exercises, see above  Continue habituation exercises, see above  Continue gaze stability exs, see above.           Canalith Repositioning Procedure:  0 minutes    Manual Therapy:  24 Minutes  1710-7283 (2units)  Prone thoracic PAs  Prone costotransverse springing B  Upper thoracic PAs/prayer hands  2nd rib mob B  Supine 1st rib mob B  SOR (no distraction)  Supine cervical PROM rot B  Manually resisted isometric rot B and extension, light resistance     Modalities: 0 minutes    Functional Outcome Measure:     Measure Used: Dizziness Handicap Inventory   Score: 46/100  Date Assessed: 7/22/20     Measure Used: Dizziness Handicap Inventory   Score: 60/100  Date Assessed: 8/12/20    Assessment/Treatment/Activity Tolerance:    Pt does well with higher level balance activities in long hallway with no UE support but appears to stagger, misstep or want to hold onto cane or wall when ambulating slowly to get to and from hallway. Pt needed cues today for HEP, states he forgets, PT encourages pt to always use his folder with ex handouts. Patients response to treatment:   [x]Patient tolerated treatment well []Patient limited by fatigue   []Patient limited by pain  []Patient limited by other medical complications   []Other:     Goals:     Vestibular GOALS established 7/22/20  Short Term Goals:   4 weeks MET NOT MET Long Term Goals:  8  weeks MET NOT MET   1). Establish HEP [x]?  []?  1). Pt independent HEP []?  []?    2). Increase strength 1/3 grade in areas of deficits [x]?  []?  2). Increase strength to 4+/5 or better [x]?  []?    3). Improve Tinetti to 19/20 [x]?  []?  3). Improve Tinetti to 24/28 []?  []?    4). Improve DHI score to 36 or less  []?  []?  4). Improve DHI score to 26 or less []?  []?    5). Pt able to ambulate household distances without AD []?  []?  5). Pt able to do flight of steps with reciprocal pattern with 1 rail  []?  []?    6). []?  []?  6). Pt able to ambulate community distances without AD  []?  []?        Shoulder  GOALS   Discharged 9/2/20  Short Term Goals:   3  weeks MET Not MET Long Term Goals:    6 weeks MET Not Met   Establish HEP [x]?  []?  Pt independent with HEP [x]?  []?    Pain  5/10 or less [x]?  []?  Pain  3/10 or less [x]?  []?    Increase identified strength deficits 1/3 grade  [x]?  []?  Increase strength to 4+/5 or greater [x]?  []?    Increase UE PROM 20 degrees with IR and ER  And 10 degrees with abduction and flexion  [x]?  []?  Achieve WFL AROM  [x]?  []?    Report increased tolerance to ADLs without increased pain  [x]?  []?  Return to all ADLS without limitation []?  [x]?      []?  []?    []?  []?      Cervical GOALS  established 9/2/20   Short Term Goals:   3  weeks MET Not Met Long Term Goals:   6  weeks MET Not Met Establish HEP []?  []?  Pt independent with HEP []?  []?    Pain  4/10 or less []?  []?  Pain  2/10 or less []?  []?    Increase ROM 5 degrees and rotation by 25% []?  []?  Achieve WFL ROM  []?  []?    Increase identified strength deficits 1/3 grade []?  []?  Increase strength to 5/5  []?  []?    Assess FOM and make LTG []?  []?  FOM goal TBD []?  []?      []?  []?  Increase average  strength L hand by 10# []?         Prognosis: [x]Good   []Fair   []Poor    Patient Requires Follow-up:  [x]Yes  []No    Plan: []Plan of care initiated    [x]Continue per plan of care for vestibular and cervical diagnoses     [] Alter current plan (see comments)    []Hold pending MD visit []Discharge       Timed Code Treatment Minutes:  94 (total with both diagnoses)     Total Treatment Minutes:  94  (total with both diagnoses)     Medicare Cap total YTD:   [x]N/A    Workers Comp Time Stamp  []N/A   Time In: 1330     Time Out: 5621 PM  (Total for both diagnoses)    Electronically signed by:  Gage Valdez, PT, OMT-C, [id number]

## 2020-09-16 ENCOUNTER — HOSPITAL ENCOUNTER (OUTPATIENT)
Dept: PHYSICAL THERAPY | Age: 65
Setting detail: THERAPIES SERIES
Discharge: HOME | End: 2020-09-16
Payer: COMMERCIAL

## 2020-09-16 PROCEDURE — 97116 GAIT TRAINING THERAPY: CPT

## 2020-09-16 PROCEDURE — 97110 THERAPEUTIC EXERCISES: CPT

## 2020-09-16 PROCEDURE — 97112 NEUROMUSCULAR REEDUCATION: CPT

## 2020-09-16 NOTE — PROGRESS NOTES
Outpatient Physical Therapy     [x] Daily Treatment Note   [] Progress Note   [x] Discharge Note (for vestibular dx)     Date:  9/16/2020    Patient Name:  Tonya Cornelius         YOB: 1955    Medical Diagnosis: unspecified disorder of vestibular system H81.90 ; ORIF clavicle, I S42.022K displaced fx shaft of L clavicle                                                    Treatment Diagnosis:  Imbalance, dizziness/vertigo ;  L shoulder pain, decreased mobility, weakness          Onset Date:    2/14/2019             Referral Date:  7/17/20 (vestibular)  / 7/13/20 (shoulder)              Referring Physician:  Lisbet Asif (vestibular) / Dr. Rodney Parsons (shoulder)                                                    Visits Allowed/Insurance/Certification Information:  Vestibular [facility], 16 visits approved through 9/25/20  / shoulder [facility], 12 visits approved until 8/21/20  /  C9 approved for cervical treatment 2-3x6 09/11/2020-10/31/2020     Restrictions/Precautions: HTN, DM, hx of upper cervical fractures, recent neck surgery 6/2020     Plan of care sent to provider Bernardino Fischer):      [x]Faxed  []Co-signature    (attempts: 1[x] 2 []3[])      Plan of care signed:      [x]Yes date: 7/27/20              Plan of care sent to provider Magi Maradiaga):      [x]Faxed  []Co-signature    (attempts: 1[x] 2 []3[])     Plan of care signed:      [x]Yes date:   7/23/20      Plan of care sent to provider [facility] fax # [phone]):      [x]Faxed 9/2/20  []Co-signature    (attempts: 1[x] 2 [x] 9/14/20 3[])     Plan of care signed:      []Yes date:     [x]No         Progress Note covers period from (if applicable):    [x]NA    []From 8/12/20 to 9/16/20 for vestibular dx     Next Progress Note due:   By 10/1/20 for cervical diagnosis    Visit# / total visits:  Vestibular 16/16  Cervical  4/12     Plan for Next Session:     Manual therapy to cervical/thoracic/ribs with caution to C5-6 fusion and upper cervical healed maintain retraction, to reduce crepitus with extension. Advanced to 5 lbs to check form because pt states he is using 5 lbs at home. Prone row  5#, 3x15  [x]   Crepitus noted in scapula     Prone horizontal abduction  With scap retraction 3#, 3X15  [x] Unable to maintain form with 5#      Prone scaption  with arm off edge of bed (prone Y with arm off edge) 0#, 3x15   [x]   Cues to keep thumb up      SL ER  2# 2X10 [] With scapular retraction      Shoulder IR with tband Green, 3x12  [x]        Wall push ups  x10  [x]   Significant winging of scapula noted. Wall push up with serratus plus 2x10  Noted significant winging of scapula     Rhythmic stabilization 3x30 sec, distal perturbations []      PNF D1 and D2 UE patterns in   standing                                                          2x10 each   [] Gentle assist to achieve full ROM     Supine active flexion  3# 1x10     Standing Abduction  2x10     Standing  Forward flexion  2x10      Manually resisted scap retraction in SL 2x10, mod resistance  Much crepitus noted   Standing wall slide  2x10     SL HAB (LUE) x10  1# x10  Cuing to incr eccentric control and maintaining scapular retraction with eccentric lowering     Vestibular Diagnosis:    []      Eye Exercises:         Saccades, seated  Pt inst to do this exercise x 1 minute in horizontal direction and x 1 minute in vertical direction. [x]   Reviewed, continue     Corrective saccades, seated, week 2 Pt inst to do this exercise x 2 minute in horizontal direction and x 2 minute in vertical direction, 2x/day  [x]   Reviewed, continue     VOR x 1 viewing, standing, close target Pt inst to do this exercise x 1 minute with horizontal head turns and x 1 minute with vertical head nods. Pt to only go as fast as he/she can while keeping target clear and in focus. Pt inst to do this ex 3x/day.          25 reps/min Horizontal       30 reps/min vertical          VOR x 1 viewing, standing, far target, week 2 Pt inst to do this exercise x 1 minute with horizontal head turns and x 1 minute with vertical head nods. Pt to only go as fast as he/she can while keeping target clear and in focus. Pt inst to do this ex 2x/day. NT today reps Horizontal  NT today reps vertical      VOR Checkerboard, week 4 close target Pt to cont to practice to keep in focus  Continue   VOR x 2 viewing, week 4 1 min each direction standing   continue     Habituation Exercises:         Rolling B - pt states he has not been doing this ex even though PT has been asking him about it each visit and he states he is still dizzy with exercise. Reviewed, 5 repetitions (Rolling R, center, rolling L, center)   Pt to do 2 times per day  [x]   Pt to do some with eyes open and some with eyes closed. supine to longsit and vice versa- pt states he has been doing this one but needed cues  Reviewed, 5 repetitions (sit up, lie back down)   Pt to do 2 times per day  continue   Nose to knee5 repetitions (nose to R knee, back up, nose to L knee and back up), 2x/day. continue    Discontinued    PNF Wall taps in standing 2x10       scifit  Seat 8, with arms Level 4.0, 10 minutes   At end of session       Bridges  2x15 [x]      Standing heel raises 2x15 [x]      Standing ABD  2#, 2x10 B []      Step ups 8\" 2x10 B  [] Single UE     Step ups onto 6\" step, blue foam 2x20 []      Static stance on Blue foam  Conditions   Pt tends to lean posteriorly.      March in place on blue foam  2x20   Single R UE    Static Stance on firm surface ;   Repeated On blue foam Feet apart: conditions 1-6  Feet together: conditions 1-6    In corner    Step up and over, 8\" step  Lateral step up and over 6\" step 1x10 B UE; 1x10 L UE alternating pattern  1x10 BUE; 1x10 RUE    Single UE for balance   Lateral eccentric step down, 4\" step 2x10 B  Bilateral UE for balance   Lateral cone step over at // bars 2x  Bilateral UE   Step over cone, forward @ // bars  3x B  B UE, Single UE, No UE          Cervical Diagnosis:        cervical AROM  As tolerated      gripping stress ball  As tolerated      Theraputty:    [x]         [place] hunt 10 beans  Green putty         squeeze and rotate putty   \"         Roll out donut and spread with                fingers   \"         Roll out and pinch with fingers   \"         Wrist flexion curls 3# 3x12  [x]          Wrist extension curls  2# 3x10  [x]          Supination/pronation with elbow at side  3# 3x12  [x]          EZ velcro board  2 min large roller wrist ext/flexion  2 min large roller sup/pron        Self thoracic mob with towel roll vertically and horizontally  1-2 minutes, pt given handout       Therapeutic Exercise/Home Exercise Program:   25 minutes (2units) ([id number])  Reassessed for progress note for vestibular diagnosis:   LE strength:   B hip abd 5/5  B hip add 5/5  B hip extension 5/5  gastrocs in WB  4+/5 B    HEP has been established, See above under cervical dx,  Reviewed and progressed  Pt inst to do UE and LE strength and ROM exs ONLY 1 TIME PER DAY. Access Code: [email or web address]   URL: [email or web address]   Date: 09/02/2020   Prepared by: Heri Celeste     Exercises   Circular Shoulder Pendulum with Table Support - 20 reps - 2 sets - 2x daily - 7x weekly   Supine Shoulder Flexion AAROM - 10 reps - 10 hold - 2x daily - 7x weekly   Supine Shoulder External Internal Rotation AAROM with Dowel - 10 reps - 10 hold - 2x daily - 7x weekly   Sleeper Stretch - 10 reps - 10 hold - 2x daily - 7x weekly   Standing Shoulder Row with Anchored Resistance - 10-15 reps - 3 sets - 1x daily - 7x weekly   Bridge - 10-15 reps - 3 sets - 1x daily - 7x weekly   Heel rises with counter support - 10-15 reps - 3 sets - 1x daily - 7x weekly   Supine Single Arm Shoulder Protraction - 10-15 reps - 3 sets - 1x daily - 7x weekly   Prone Shoulder Extension - Single Arm - 10-15 reps - 3 sets - 1x daily - 7x weekly   Supine Triceps Extension with Resistance - 10 reps - 3 sets - 1x daily - 7x weekly   Supine Elbow Flexion Extension with Dumbbell - 10-15 reps - 3 sets - 1x daily - 7x weekly   Sidelying Shoulder ER with Towel and Dumbbell - 10-15 reps - 3 sets - 1x daily - 7x weekly   Prone Shoulder Row - 10-15 reps - 3 sets - 1x daily - 7x weekly   Shoulder Internal Rotation with Resistance - 10-15 reps - 3 sets - 1x daily - 7x weekly   Prone Single Arm Shoulder Horizontal Abduction with Dumbbell - Palm Down - 10-15 reps - 3 sets - 1x daily - 7x weekly   Wall Push Up - 10-15 reps - 3 sets - 1x daily - 7x weekly   Prone Single Arm Shoulder Y with Dumbbell - 10-15 reps - 3 sets - 1x daily - 7x weekly   Supine Shoulder Flexion with Free Weight - 10 reps - 3 sets - 1x daily - 7x weekly   standing shoulder flexion - 10 reps - 3 sets - 1x daily - 7x weekly   Standing Alternating Shoulder Abduction - 10 reps - 3 sets - 1x daily - 7x weekly         Therapeutic Activity:  0 minutes      Gait: 15 minutes  [id number] (1unit)  Pt able to ambulate throughout dept 100' + without AD, only 1 small stagger noted when turning corner and pt wanting to grab onto parallel bar. Steps: pt able to do flight of steps with reciprocal pattern with 1 rail, independent but with slow vinita and pt descends slightly sideways (states he has always done this)  Pt able to ambulate 500'+ without AD, out of dept, to outside, down sidewalk, in level grass and up/down small grassy hill , up and down curb. Pt exhibits slower vinita and caution however no LOB, no falter, no staggering noted. Pt had some difficulty descending curb step, turns sideways and steps down with his R foot first, states it is partly due to the depth perception. Pt likes to ascend curb with R foot and down with R foot stating he likes to use what he feels is the stronger leg.      Discussed with pt to try going without his cane (or holding onto furniture) when he is in a safe environment on level surface such as at home and when he walks into PT dept from parking lot. Discussed trying intermittently to build confidence and gradually progressing to goal of no AD. Pt appears to be hesitant, but states \"i'll try\". Neuromuscular Re-Education:  50 minutes  [id number] (3units)  Reassessed Tinetti   Balance Score: 13  Gait Score: 10  Tinetti Total Score: 23  Reassessed DHI see below   Reassessed MSQ  On 8/10/20: Total score 22 x # of provoking positions 11 / 20.48 =  11.82 (11-30% = moderate) (Total score # Corrected today, PT had originally written Total score of 11 on 8/10/20 when it should have been 22)  Today: Total score 12 x # of provoking positions 10 / 20.48 =  5.86  (0-10% = mild)   Continue Eye exercises, see above  Continue habituation exercises, see above  Continue gaze stability exs, see above. Canalith Repositioning Procedure:  0 minutes    Manual Therapy:  0 Minutes (0units)  Prone thoracic PAs  Prone costotransverse springing B  Upper thoracic PAs/prayer hands  2nd rib mob B  Supine 1st rib mob B  SOR (no distraction)  Supine cervical PROM rot B  Manually resisted isometric rot B and extension, light resistance     Modalities: 0 minutes    Functional Outcome Measure:     Measure Used: Dizziness Handicap Inventory   Score: 46/100  Date Assessed: 7/22/20     Measure Used: Dizziness Handicap Inventory   Score: 60/100  Date Assessed: 8/12/20    Measure Used: Dizziness Handicap Inventory   Score: 28/100  Date Assessed: 9/16/20      Assessment/Treatment/Activity Tolerance:    Pt has made good progress with PT for vestibular diagnosis since last progress note. DHI score has decreased from 60/100 to 28/100. Tinetti has improved from 19/28 to 23/28. Motion sensitivity quotient has also improved from 11.82% to 5.86% which is a mild deficit. PT spoke with  who states that one of his goals is to return to driving a car. Pt has extensive HEP and has demonstrated independence with exercises.   PT feels pt can be discharged from 4/10 or less []?  []?  Pain  2/10 or less []?  []?    Increase ROM 5 degrees and rotation by 25% []?  []?  Achieve WFL ROM  []?  []?    Increase identified strength deficits 1/3 grade []?  []?  Increase strength to 5/5  []?  []?    Assess FOM and make LTG []?  []?  FOM goal TBD []?  []?      []?  []?  Increase average  strength L hand by 10# []?         Prognosis: [x]Good   []Fair   []Poor    Patient Requires Follow-up:  [x]Yes for cervical diagnosis   []No    Plan: []Plan of care initiated    [x]Continue per plan of care for cervical diagnosis   [] Alter current plan (see comments)    []Hold pending MD visit [x]Discharge to [facility] for vestibular diagnosis and recommending OT driving evaluation/therapy       Timed Code Treatment Minutes:  90 (total with both diagnoses)     Total Treatment Minutes:  90  (total with both diagnoses)     Medicare Cap total YTD:   [x]N/A    Workers Comp Time Stamp  []N/A   Time In: 1330     Time Out: 1500  (Total for both diagnoses)    Electronically signed by:  Gage Valdez PT, OMT-C, [id number]

## 2020-09-21 ENCOUNTER — HOSPITAL ENCOUNTER (OUTPATIENT)
Dept: PHYSICAL THERAPY | Age: 65
Setting detail: THERAPIES SERIES
Discharge: HOME | End: 2020-09-21
Payer: COMMERCIAL

## 2020-09-21 PROCEDURE — 97112 NEUROMUSCULAR REEDUCATION: CPT

## 2020-09-21 PROCEDURE — 97140 MANUAL THERAPY 1/> REGIONS: CPT

## 2020-09-21 PROCEDURE — 97110 THERAPEUTIC EXERCISES: CPT

## 2020-09-23 ENCOUNTER — HOSPITAL ENCOUNTER (OUTPATIENT)
Dept: PHYSICAL THERAPY | Age: 65
Setting detail: THERAPIES SERIES
Discharge: HOME | End: 2020-09-23
Payer: COMMERCIAL

## 2020-09-23 PROCEDURE — 97140 MANUAL THERAPY 1/> REGIONS: CPT

## 2020-09-23 PROCEDURE — 97110 THERAPEUTIC EXERCISES: CPT

## 2020-09-23 NOTE — FLOWSHEET NOTE
Outpatient Physical Therapy     [x] Daily Treatment Note   [] Progress Note   [] Discharge Note     Date:  9/23/2020    Patient Name:  Sagar Ly         YOB: 1955    Medical Diagnosis: unspecified disorder of vestibular system H81.90 ; ORIF clavicle, I S42.022K displaced fx shaft of L clavicle                                                    Treatment Diagnosis:  Imbalance, dizziness/vertigo ;  L shoulder pain, decreased mobility, weakness          Onset Date:    2/14/2019             Referral Date:  7/17/20 (vestibular)  / 7/13/20 (shoulder)              Referring Physician:  Perla Salas (vestibular) / Dr. Malu Pressley (shoulder)                                                    Visits Allowed/Insurance/Certification Information:  Vestibular [facility], 16 visits approved through 9/25/20  / shoulder [facility], 12 visits approved until 8/21/20  /  C9 approved for cervical treatment 2-3x6 09/11/2020-10/31/2020     Restrictions/Precautions: HTN, DM, hx of upper cervical fractures, recent neck surgery 6/2020     Plan of care sent to provider Ashley Bhandari):      [x]Faxed  []Co-signature    (attempts: 1[x] 2 []3[])      Plan of care signed:      [x]Yes date: 7/27/20              Plan of care sent to provider Rebeca Zelaya):      [x]Faxed  []Co-signature    (attempts: 1[x] 2 []3[])     Plan of care signed:      [x]Yes date:   7/23/20      Plan of care sent to provider [facility] fax # [phone]):      [x]Faxed 9/2/20  []Co-signature    (attempts: 1[x] 2 [x] 9/14/20 3[x] 9/21/20)     Plan of care signed:      []Yes date:     [x]No         Progress Note covers period from (if applicable):    [x]NA    []From 8/12/20 to 9/16/20 for vestibular dx     Next Progress Note due:   By 10/1/20 for cervical diagnosis    Visit# / total visits:   Cervical  6/12     Plan for Next Session:     Manual therapy to cervical/thoracic/ribs with caution to C5-6 fusion and upper cervical healed fractures   Progress cervical strength, and L arm/ strength    Subjective:   Pt states pain woke him up last night, not sure why. Pain level:  3/10 cervical tension, pain as high as 5/10 this morning. AT EVAL:   Patient reports L shoulder pain is  0/10 pain at present and  8/10 pain at its worst.  Pt also reports pain from cervical spine that radiates to L trap and goes to L elbow, pt reports constant numbness ventral fingers L hand and weakness into L hand. Also neck  pain,                                      4/10 pain at present   6/10 pain at its worst      Objective:     Operation:    1. C5/C6 anterior cervical discectomies and foraminotomies. 2. C5/C6 anterior cervical discectomy and fusion with [employer] Interbody cage and plating system. 3. Use of intraoperative microscope  4. Use of intraoperative fluoroscopy      Exercises:    Exercises in bold performed in department today. Items not bolded are carried forward from prior visits for continuity of the record. Exercise/Equipment Resistance/Repetitions HEP Other comments       []      L Shoulder Diagnosis:   []       pendulums 20x CW and 20x CCW [x] Reviewed       flexion supine x10 [x] Advised to progress into newly obtained range      ER with cane at 90 deg abd 10 x 10 sec [x] Reviewed and corrected. Tolerating well. Sleeper stretch 10 x 10 sec [x] Reviewed and corrected form       Serratus ceilng punch         Mid rows (seated ok for now) Red, 1x10 [x] Progressed to blue band      Serratus punch  5#, 5x12 (pt did more than asked) [x]       Triceps ext supinea 5# 3x12-15        Prone shoulder extension to hip with scap retraction  5#, 3x15 [x] Cues to maintain retraction, to reduce crepitus with extension. Advanced to 5 lbs to check form because pt states he is using 5 lbs at home.      Prone row  5#, 3x15  [x]   Crepitus noted in scapula     Prone horizontal abduction  With scap retraction 3#, 3X15  [x] Unable to maintain form with 5#      Prone scaption  with arm off edge of bed (prone Y with arm off edge) 0#, 3x15   [x]   Cues to keep thumb up      SL ER  2# 2X10 [] With scapular retraction      Shoulder IR with tband Green, 3x12  [x]        Wall push ups  x10  [x]   Significant winging of scapula noted. Wall push up with serratus plus 2x10  Noted significant winging of scapula     Rhythmic stabilization 3x30 sec, distal perturbations []      PNF D1 and D2 UE patterns in   standing                                                          2x10 each   [] Gentle assist to achieve full ROM     Supine active flexion  3# 1x10     Standing Abduction  2x10     Standing  Forward flexion  2x10      Manually resisted scap retraction in SL 2x10, mod resistance  Much crepitus noted   Standing wall slide  2x10     SL HAB (LUE) x10  1# x10  Cuing to incr eccentric control and maintaining scapular retraction with eccentric lowering     Vestibular Diagnosis:    []      Eye Exercises:         Saccades, seated  Pt inst to do this exercise x 1 minute in horizontal direction and x 1 minute in vertical direction. [x]   Reviewed, continue     Corrective saccades, seated, week 2 Pt inst to do this exercise x 2 minute in horizontal direction and x 2 minute in vertical direction, 2x/day  [x]   Reviewed, continue     VOR x 1 viewing, standing, close target Pt inst to do this exercise x 1 minute with horizontal head turns and x 1 minute with vertical head nods. Pt to only go as fast as he/she can while keeping target clear and in focus. Pt inst to do this ex 3x/day. 25 reps/min Horizontal       30 reps/min vertical          VOR x 1 viewing, standing, far target, week 2 Pt inst to do this exercise x 1 minute with horizontal head turns and x 1 minute with vertical head nods. Pt to only go as fast as he/she can while keeping target clear and in focus. Pt inst to do this ex 2x/day.      NT today reps Horizontal  NT today reps vertical      VOR Checkerboard, week 4 close target Pt to cont to practice to keep in focus  Continue   VOR x 2 viewing, week 4 1 min each direction standing   continue     Habituation Exercises:         Rolling B - pt states he has not been doing this ex even though PT has been asking him about it each visit and he states he is still dizzy with exercise. Reviewed, 5 repetitions (Rolling R, center, rolling L, center)   Pt to do 2 times per day  [x]   Pt to do some with eyes open and some with eyes closed. supine to longsit and vice versa- pt states he has been doing this one but needed cues  Reviewed, 5 repetitions (sit up, lie back down)   Pt to do 2 times per day  continue   Nose to knee5 repetitions (nose to R knee, back up, nose to L knee and back up), 2x/day. continue    Discontinued    PNF Wall taps in standing 2x10       scifit  Seat 8, with arms Level 4.0, 10 minutes   At end of session       Bridges  2x15 [x]      Standing heel raises 2x15 [x]      Standing ABD  2#, 2x10 B []      Step ups 8\" 2x10 B  [] Single UE     Step ups onto 6\" step, blue foam 2x20 []      Static stance on Blue foam  Conditions   Pt tends to lean posteriorly.      March in place on blue foam  2x20   Single R UE    Static Stance on firm surface ;   Repeated On blue foam Feet apart: conditions 1-6  Feet together: conditions 1-6    In corner    Step up and over, 8\" step  Lateral step up and over 6\" step 1x10 B UE; 1x10 L UE alternating pattern  1x10 BUE; 1x10 RUE    Single UE for balance   Lateral eccentric step down, 4\" step 2x10 B  Bilateral UE for balance   Lateral cone step over at // bars 2x  Bilateral UE   Step over cone, forward @ // bars  3x B  B UE, Single UE, No UE          Cervical Diagnosis:        cervical AROM  As tolerated      gripping stress ball  As tolerated      Theraputty:    [x]         [place] 10 beans  Green putty         squeeze and rotate putty   \"         Roll out donut and spread with                fingers   \"         Roll out and pinch with fingers   \"         Wrist flexion curls 3# pt doing 3x15  [x]    verbally reviewed today, pt inst to increase to 5#, 3x10       Wrist extension curls  2# 3x10  [x]   Verbally reviewed, remain at 2#       Supination/pronation with elbow at side  3# pt doing 3x15  [x]    verbally reviewed today, pt inst to increase to 5#, 3x10       EZ velcro board  large roller wrist ext/flexion  large roller sup/pron  Key roller  and sup/pron  All to fatigue      Self thoracic mob with towel roll vertically and horizontally  Reviewed, 1-2 minutes, pt given handout  [x]         Supine deep neck flexors chin nod  Reviewed, Hold 10 sec, 10 reps 2x/day  [x]         Cervical isometrics in supine with head and neck supported on pillow Light resistance, hold 5 sec, 10 reps into extension and Rot B   [x]        Alternating mid rows at pulleys  5.0 # B 3x10             Therapeutic Exercise/Home Exercise Program:   31 minutes (2units) ([id number])  HEP has been established, See above under cervical dx,  Reviewed and progressed  Pt inst to do UE and LE strength and ROM exs ONLY 1 TIME PER DAY. Access Code: [email or web address]   URL: [employer]/   Date: 09/02/2020   Prepared by: Baylee Choudhary     Exercises   Circular Shoulder Pendulum with Table Support - 20 reps - 2 sets - 2x daily - 7x weekly   Supine Shoulder Flexion AAROM - 10 reps - 10 hold - 2x daily - 7x weekly   Supine Shoulder External Internal Rotation AAROM with Dowel - 10 reps - 10 hold - 2x daily - 7x weekly   Sleeper Stretch - 10 reps - 10 hold - 2x daily - 7x weekly   Standing Shoulder Row with Anchored Resistance - 10-15 reps - 3 sets - 1x daily - 7x weekly   Bridge - 10-15 reps - 3 sets - 1x daily - 7x weekly   Heel rises with counter support - 10-15 reps - 3 sets - 1x daily - 7x weekly   Supine Single Arm Shoulder Protraction - 10-15 reps - 3 sets - 1x daily - 7x weekly   Prone Shoulder Extension - Single Arm - 10-15 reps - 3 sets - 1x daily - 7x weekly   Supine Triceps Extension with Resistance - 10 reps - 3 sets - 1x daily - 7x weekly   Supine Elbow Flexion Extension with Dumbbell - 10-15 reps - 3 sets - 1x daily - 7x weekly   Sidelying Shoulder ER with Towel and Dumbbell - 10-15 reps - 3 sets - 1x daily - 7x weekly   Prone Shoulder Row - 10-15 reps - 3 sets - 1x daily - 7x weekly   Shoulder Internal Rotation with Resistance - 10-15 reps - 3 sets - 1x daily - 7x weekly   Prone Single Arm Shoulder Horizontal Abduction with Dumbbell - Palm Down - 10-15 reps - 3 sets - 1x daily - 7x weekly   Wall Push Up - 10-15 reps - 3 sets - 1x daily - 7x weekly   Prone Single Arm Shoulder Y with Dumbbell - 10-15 reps - 3 sets - 1x daily - 7x weekly   Supine Shoulder Flexion with Free Weight - 10 reps - 3 sets - 1x daily - 7x weekly   standing shoulder flexion - 10 reps - 3 sets - 1x daily - 7x weekly   Standing Alternating Shoulder Abduction - 10 reps - 3 sets - 1x daily - 7x weekly         Therapeutic Activity:  0 minutes      Gait: 0 minutes      Neuromuscular Re-Education:  0 minutes   (0units)        Canalith Repositioning Procedure:  0 minutes    Manual Therapy:  34 Minutes [id number] (2 units)  PT looked up post accident CT scan for location of upper cervical fractures:   Again demonstrated are acute traumatic fractures of the right lateral mass and anterior arch of C1 extending to the right articular facet as well as a traumatic comminuted fracture of the right transverse process and lateral mass of C2 involving the right vertebral foramen. Unchanged appearance of traumatic fracture of the right occipital condyle. No change in alignment on flexion or extension views.  No evidence of atlantoaxial instability. Moderate C5-6 and C6-7 disc degenerative changes.     Prone thoracic PAs  Prone costotransverse springing B  Upper thoracic PAs/prayer hands   good mobility and position  Supine 1st rib mob B  SOR   Gr I OA distraction x 5 reps  Supine gentle cervical PROM rot B followed by Zainab Justice in pain free range (20-30 deg)  Manually resisted isometric rot B and extension, light resistance     Modalities: 0 minutes    Functional Outcome Measure:     Measure Used: Dizziness Handicap Inventory   Score: 46/100  Date Assessed: 7/22/20     Measure Used: Dizziness Handicap Inventory   Score: 60/100  Date Assessed: 8/12/20    Measure Used: Dizziness Handicap Inventory   Score: 28/100  Date Assessed: 9/16/20      Assessment/Treatment/Activity Tolerance:    Pt states neck is \"stiff\" after treatment     Patients response to treatment:   [x]Patient tolerated treatment well []Patient limited by fatigue   []Patient limited by pain  []Patient limited by other medical complications   []Other:     Goals:     Vestibular GOALS  Discharged 9/16/20  Short Term Goals:   4 weeks MET NOT MET Long Term Goals:  8  weeks MET NOT MET   1). Establish HEP [x]?  []?  1). Pt independent HEP [x]?  []?    2). Increase strength 1/3 grade in areas of deficits [x]?  []?  2). Increase strength to 4+/5 or better [x]?  []?    3). Improve Tinetti to 19/20 [x]?  []?  3). Improve Tinetti to 24/28 PROGRESSING TOWARDS []?  []?    4). Improve DHI score to 36 or less  [x]?  []?  4). Improve DHI score to 26 or less PROGRESSING TOWARDS  []?  []?    5). Pt able to ambulate household distances without AD [x]?  []?  5). Pt able to do flight of steps with reciprocal pattern with 1 rail  [x]?  []?    6). []?  []?  6).   Pt able to ambulate community distances without AD  [x]?  []?        Shoulder  GOALS   Discharged 9/2/20  Short Term Goals:   3  weeks MET Not MET Long Term Goals:    6 weeks MET Not Met   Establish HEP [x]?  []?  Pt independent with HEP [x]?  []?    Pain  5/10 or less [x]?  []?  Pain  3/10 or less [x]?  []?    Increase identified strength deficits 1/3 grade  [x]?  []?  Increase strength to 4+/5 or greater [x]?  []?    Increase UE PROM 20 degrees with IR and ER  And 10 degrees with abduction and flexion  [x]?

## 2020-09-28 ENCOUNTER — HOSPITAL ENCOUNTER (OUTPATIENT)
Dept: PHYSICAL THERAPY | Age: 65
Setting detail: THERAPIES SERIES
Discharge: HOME | End: 2020-09-28
Payer: COMMERCIAL

## 2020-09-28 PROCEDURE — 97140 MANUAL THERAPY 1/> REGIONS: CPT

## 2020-09-28 PROCEDURE — 97110 THERAPEUTIC EXERCISES: CPT

## 2020-09-28 NOTE — PROGRESS NOTES
Outpatient Physical Therapy     [x] Daily Treatment Note   [x] Progress Note   [] Discharge Note     Date:  9/28/2020    Patient Name:  Jaida Walton         YOB: 1955    Medical Diagnosis: unspecified disorder of vestibular system H81.90 ; ORIF clavicle, I S42.022K displaced fx shaft of L clavicle                                                    Treatment Diagnosis:  Imbalance, dizziness/vertigo ;  L shoulder pain, decreased mobility, weakness          Onset Date:    2/14/2019             Referral Date:  7/17/20 (vestibular)  / 7/13/20 (shoulder)              Referring Physician:  Sudhir Cabrera (vestibular) / Dr. Blake Snowden (shoulder)                                                    Visits Allowed/Insurance/Certification Information:  Vestibular [facility], 16 visits approved through 9/25/20  / shoulder [facility], 12 visits approved until 8/21/20  /  C9 approved for cervical treatment 2-3x6 09/11/2020-10/31/2020     Restrictions/Precautions: HTN, DM, hx of upper cervical fractures, recent neck surgery 6/2020     Plan of care sent to provider Mak Rios):      [x]Faxed  []Co-signature    (attempts: 1[x] 2 []3[])      Plan of care signed:      [x]Yes date: 7/27/20              Plan of care sent to provider Luis Arzola):      [x]Faxed  []Co-signature    (attempts: 1[x] 2 []3[])     Plan of care signed:      [x]Yes date:   7/23/20      Plan of care sent to provider [facility] fax # [phone]):      [x]Faxed 9/2/20  []Co-signature    (attempts: 1[x] 2 [x] 9/14/20 3[x] 9/21/20)     Plan of care signed:      []Yes date:     [x]No         Progress Note covers period from (if applicable):    []NA    [x]From 9/2/20 to 9/28/20 cervical dx     Next Progress Note due:   By 10/28/20    Visit# / total visits:   Cervical  7/12     Plan for Next Session:     Manual therapy to cervical/thoracic/ribs with caution to C5-6 fusion and upper cervical healed fractures   Progress cervical strength, and L arm/ strength    Subjective:   Pt states oerall his neck is no better since starting PT, however, PT points out that his reported pain scales have not been any higher than 5/10 and he was at 8/10 at the worst when first starting PT. Pt states he saw Dr. Hu Forward this past Friday and he is supposed to be putting order in for driving evaluation and therapy    Pain level:  2/10 cervical tension, pain as high as 4/10 over the weekend. AT EVAL:   Patient reports L shoulder pain is  0/10 pain at present and  8/10 pain at its worst.  Pt also reports pain from cervical spine that radiates to L trap and goes to L elbow, pt reports constant numbness ventral fingers L hand and weakness into L hand. Also neck  pain,                                      4/10 pain at present   6/10 pain at its worst      Objective:     Operation:    1. C5/C6 anterior cervical discectomies and foraminotomies. 2. C5/C6 anterior cervical discectomy and fusion with [employer] Interbody cage and plating system. 3. Use of intraoperative microscope  4. Use of intraoperative fluoroscopy      Exercises:    Exercises in bold performed in department today. Items not bolded are carried forward from prior visits for continuity of the record. Exercise/Equipment Resistance/Repetitions HEP Other comments       []      L Shoulder Diagnosis:   []       pendulums 20x CW and 20x CCW [x] Reviewed       flexion supine x10 [x] Advised to progress into newly obtained range      ER with cane at 90 deg abd 10 x 10 sec [x] Reviewed and corrected. Tolerating well. Sleeper stretch 10 x 10 sec [x] Reviewed and corrected form       Serratus ceilng punch         Mid rows (seated ok for now) Red, 1x10 [x] Progressed to blue band      Serratus punch  5#, 5x12 (pt did more than asked) [x]       Triceps ext supinea 5# 3x12-15        Prone shoulder extension to hip with scap retraction  5#, 3x15 [x] Cues to maintain retraction, to reduce crepitus with extension.   Advanced to 5 lbs to check form because pt states he is using 5 lbs at home. Prone row  5#, 3x15  [x]   Crepitus noted in scapula     Prone horizontal abduction  With scap retraction 3#, 3X15  [x] Unable to maintain form with 5#      Prone scaption  with arm off edge of bed (prone Y with arm off edge) 0#, 3x15   [x]   Cues to keep thumb up      SL ER  2# 2X10 [] With scapular retraction      Shoulder IR with tband Green, 3x12  [x]        Wall push ups  x10  [x]   Significant winging of scapula noted. Wall push up with serratus plus 2x10  Noted significant winging of scapula     Rhythmic stabilization 3x30 sec, distal perturbations []      PNF D1 and D2 UE patterns in   standing                                                          2x10 each   [] Gentle assist to achieve full ROM     Supine active flexion  3# 1x10     Standing Abduction  2x10     Standing  Forward flexion  2x10      Manually resisted scap retraction in SL 2x10, mod resistance  Much crepitus noted   Standing wall slide  2x10     SL HAB (LUE) x10  1# x10  Cuing to incr eccentric control and maintaining scapular retraction with eccentric lowering     Vestibular Diagnosis:    []      Eye Exercises:         Saccades, seated  Pt inst to do this exercise x 1 minute in horizontal direction and x 1 minute in vertical direction. [x]   Reviewed, continue     Corrective saccades, seated, week 2 Pt inst to do this exercise x 2 minute in horizontal direction and x 2 minute in vertical direction, 2x/day  [x]   Reviewed, continue     VOR x 1 viewing, standing, close target Pt inst to do this exercise x 1 minute with horizontal head turns and x 1 minute with vertical head nods. Pt to only go as fast as he/she can while keeping target clear and in focus. Pt inst to do this ex 3x/day.          25 reps/min Horizontal       30 reps/min vertical          VOR x 1 viewing, standing, far target, week 2 Pt inst to do this exercise x 1 minute with horizontal head gripping stress ball  As tolerated      Theraputty:    [x]         Rock hunt 10 beans  Green putty         squeeze and rotate putty   \"         Roll out donut and spread with                fingers   \"         Roll out and pinch with fingers   \"         Wrist flexion curls 3# pt doing 3x15  [x]    verbally reviewed today, pt inst to increase to 5#, 3x10       Wrist extension curls  2# 3x10  [x]   Verbally reviewed, remain at 2#       Supination/pronation with elbow at side  3# pt doing 3x15  [x]    verbally reviewed today, pt inst to increase to 5#, 3x10       EZ velcro board  large roller wrist ext/flexion  large roller sup/pron  Key roller  and sup/pron  All to fatigue      Self thoracic mob with towel roll vertically and horizontally  Reviewed, 1-2 minutes, pt given handout  [x]         Supine deep neck flexors chin nod  Reviewed, Hold 10 sec, 10 reps 2x/day  [x]         Cervical isometrics in supine with head and neck supported on pillow Light resistance, hold 5 sec, 10 reps into extension and Rot B   [x]        Alternating mid rows at pulleys  5.0 # B 3x10             Therapeutic Exercise/Home Exercise Program:   29 minutes (2units) (3029-9065)  HEP has been established, See above under cervical dx,  Reviewed and progressed   Reassessed for progress note:     UE Range of Motion/Strength Testing      [x]? All strength WFL (5/5) except as marked below  Items in bold measured today, others taken from [facility]            Range Tested MMT/ Resisted Comments   *denotes pain Left Right     Shoulder Shrug  C4         Shoulder Flexion 4+       Shoulder Extension         Shoulder Abduction  C5 4+       Shoulder Adduction          Shoulder IR         Shoulder ER         Elbow Flexion  C6         Elbow Extension C7         Pronation 4+       Supination 4+       Wrist Flexion C7 4+       Wrist Extension C6 4         At eval,   47, 50, 42#  average 46.3#  Today  47#, 47#, 46#  Average  46.7# At eval,  67, 70, 75#  average 70.7#  Today  80#, 80#, 80#  Average  80# Norms:   R 89.7mean,  range   L 76.8 mean, range    Thumb Ext C8         Intrinsics T1 4            Range of Motion   [x]? Cervical Spine      ROM Deficits Identified             *Denotes pain AROM              Bubble inclinometer  Eval               Today MMT/Resisted Tests   Flexion 17 deg                 23 deg 4   Extension 17 deg                 23 deg 4   Sidebending Left 10 deg                 15 deg 4   Sidebending Right 15 deg                16 deg 4   Rotation Left Dec 75%            Dec 90%                                  actively but                               PT can get                              To 75%                                    passively            4   Rotation Right Dec 75%            Dec 75% 4          Pt inst to do UE and LE strength and ROM exs ONLY 1 TIME PER DAY. Access Code: [email or web address]   URL: [employer]/   Date: 09/02/2020   Prepared by: Deion Wills     Exercises   Circular Shoulder Pendulum with Table Support - 20 reps - 2 sets - 2x daily - 7x weekly   Supine Shoulder Flexion AAROM - 10 reps - 10 hold - 2x daily - 7x weekly   Supine Shoulder External Internal Rotation AAROM with Dowel - 10 reps - 10 hold - 2x daily - 7x weekly   Sleeper Stretch - 10 reps - 10 hold - 2x daily - 7x weekly   Standing Shoulder Row with Anchored Resistance - 10-15 reps - 3 sets - 1x daily - 7x weekly   Bridge - 10-15 reps - 3 sets - 1x daily - 7x weekly   Heel rises with counter support - 10-15 reps - 3 sets - 1x daily - 7x weekly   Supine Single Arm Shoulder Protraction - 10-15 reps - 3 sets - 1x daily - 7x weekly   Prone Shoulder Extension - Single Arm - 10-15 reps - 3 sets - 1x daily - 7x weekly   Supine Triceps Extension with Resistance - 10 reps - 3 sets - 1x daily - 7x weekly   Supine Elbow Flexion Extension with Dumbbell - 10-15 reps - 3 sets - 1x daily - 7x weekly   Sidelying Shoulder ER with Towel and Dumbbell - 10-15 reps - 3 sets - 1x daily - 7x weekly   Prone Shoulder Row - 10-15 reps - 3 sets - 1x daily - 7x weekly   Shoulder Internal Rotation with Resistance - 10-15 reps - 3 sets - 1x daily - 7x weekly   Prone Single Arm Shoulder Horizontal Abduction with Dumbbell - Palm Down - 10-15 reps - 3 sets - 1x daily - 7x weekly   Wall Push Up - 10-15 reps - 3 sets - 1x daily - 7x weekly   Prone Single Arm Shoulder Y with Dumbbell - 10-15 reps - 3 sets - 1x daily - 7x weekly   Supine Shoulder Flexion with Free Weight - 10 reps - 3 sets - 1x daily - 7x weekly   standing shoulder flexion - 10 reps - 3 sets - 1x daily - 7x weekly   Standing Alternating Shoulder Abduction - 10 reps - 3 sets - 1x daily - 7x weekly         Therapeutic Activity:  0 minutes      Gait: 0 minutes      Neuromuscular Re-Education:  0 minutes   (0units)        Canalith Repositioning Procedure:  0 minutes    Manual Therapy:  15 Minutes [id number] (1 units)  PT looked up post accident CT scan for location of upper cervical fractures:   Again demonstrated are acute traumatic fractures of the right lateral mass and anterior arch of C1 extending to the right articular facet as well as a traumatic comminuted fracture of the right transverse process and lateral mass of C2 involving the right vertebral foramen. Unchanged appearance of traumatic fracture of the right occipital condyle. No change in alignment on flexion or extension views.  No evidence of atlantoaxial instability. Moderate C5-6 and C6-7 disc degenerative changes.     Trial of IASTM to B cerival paraspinals and traps  Scanning strokes HG7 and HG8  Strumming and brushing strokes with HG9  No complaints, no significant areas of tension or tenderness noted, SB improved to 18 deg B, extension remains 23 deg and flexion somehow decreased to 17 degrees (all active movement)   Modalities: 0 minutes    Functional Outcome Measure:     Measure Used: Dizziness Handicap Inventory   Score: 46/100  Date Assessed: 7/22/20     Measure Used: Dizziness Handicap Inventory   Score: 60/100  Date Assessed: 8/12/20    Measure Used: Dizziness Handicap Inventory   Score: 28/100  Date Assessed: 9/16/20    Measure Used: Neck disability index  Score: 24/50  Date Assessed: 9/28/20        Assessment/Treatment/Activity Tolerance:  Pt making some progress with PT. Reported pain levels have decreased from 7-8/10 at its worst to 4/10 at its worst in the past few sessions. Strength improving and ROM has improved slightly. PT just assessed FOM for neck today (neck disability index and made LTG). Patients response to treatment:   [x]Patient tolerated treatment well []Patient limited by fatigue   []Patient limited by pain  []Patient limited by other medical complications   []Other:     Goals:     Vestibular GOALS  Discharged 9/16/20  Short Term Goals:   4 weeks MET NOT MET Long Term Goals:  8  weeks MET NOT MET   1). Establish HEP [x]?  []?  1). Pt independent HEP [x]?  []?    2). Increase strength 1/3 grade in areas of deficits [x]?  []?  2). Increase strength to 4+/5 or better [x]?  []?    3). Improve Tinetti to 19/20 [x]?  []?  3). Improve Tinetti to 24/28 PROGRESSING TOWARDS []?  []?    4). Improve DHI score to 36 or less  [x]?  []?  4). Improve DHI score to 26 or less PROGRESSING TOWARDS  []?  []?    5). Pt able to ambulate household distances without AD [x]?  []?  5). Pt able to do flight of steps with reciprocal pattern with 1 rail  [x]?  []?    6). []?  []?  6).   Pt able to ambulate community distances without AD  [x]?  []?        Shoulder  GOALS   Discharged 9/2/20  Short Term Goals:   3  weeks MET Not MET Long Term Goals:    6 weeks MET Not Met   Establish HEP [x]?  []?  Pt independent with HEP [x]?  []?    Pain  5/10 or less [x]?  []?  Pain  3/10 or less [x]?  []?    Increase identified strength deficits 1/3 grade  [x]?  []?  Increase strength to 4+/5 or greater [x]?  []?    Increase UE PROM 20 degrees with IR and ER  And 10 degrees with abduction and flexion  [x]?  []?  Achieve WFL AROM  [x]?  []?    Report increased tolerance to ADLs without increased pain  [x]?  []?  Return to all ADLS without limitation []?  [x]?      []?  []?    []?  []? Cervical GOALS  established 9/2/20   Short Term Goals:   3  weeks MET Not Met Long Term Goals:   6  weeks MET Not Met   Establish HEP [x]?  []?  Pt independent with HEP []?  []?    Pain  4/10 or less PARTIALLY MET []?  []?  Pain  2/10 or less []?  []?    Increase ROM 5 degrees and rotation by 25%  PARTIALLY MET []?  []?  Achieve WFL ROM  []?  []?    Increase identified strength deficits 1/3 grade PARTIALLY MET []?  []?  Increase strength to 5/5  []?  []?    Assess FOM and make LTG [x]?  []?  Improve score on neck disability index to 15/50 or less []?  []?      []?  []?  Increase average  strength L hand by 10# []?         Prognosis: [x]Good   []Fair   []Poor    Patient Requires Follow-up:  [x]Yes for cervical diagnosis   []No    Plan: []Plan of care initiated    [x]Continue per plan of care for cervical diagnosis   [] Alter current plan (see comments)    []Hold pending MD visit []Discharge        Timed Code Treatment Minutes:  44     Total Treatment Minutes:    44     Medicare Cap total YTD:   [x]N/A    Workers Comp Time Stamp  []N/A   Time In: 2475     Time Out: 1440      Electronically signed by:  Adeline Plunkett, [address] [facility], [facility] [place] [place], [id number]

## 2020-09-30 ENCOUNTER — HOSPITAL ENCOUNTER (OUTPATIENT)
Dept: PHYSICAL THERAPY | Age: 65
Setting detail: THERAPIES SERIES
Discharge: HOME | End: 2020-09-30
Payer: COMMERCIAL

## 2020-09-30 PROCEDURE — 97110 THERAPEUTIC EXERCISES: CPT

## 2020-09-30 PROCEDURE — 97140 MANUAL THERAPY 1/> REGIONS: CPT

## 2020-09-30 NOTE — FLOWSHEET NOTE
Outpatient Physical Therapy     [x] Daily Treatment Note   [] Progress Note   [] Discharge Note     Date:  9/30/2020    Patient Name:  Abby Yin         YOB: 1955    Medical Diagnosis: unspecified disorder of vestibular system H81.90 ; ORIF clavicle, I S42.022K displaced fx shaft of L clavicle                                                    Treatment Diagnosis:  Imbalance, dizziness/vertigo ;  L shoulder pain, decreased mobility, weakness          Onset Date:    2/14/2019             Referral Date:  7/17/20 (vestibular)  / 7/13/20 (shoulder)              Referring Physician:  Shey Welch (vestibular) / Dr. Jeannette Pérez (shoulder)                                                    Visits Allowed/Insurance/Certification Information:  Vestibular [facility], 16 visits approved through 9/25/20  / shoulder [facility], 12 visits approved until 8/21/20  /  C9 approved for cervical treatment 2-3x6 09/11/2020-10/31/2020     Restrictions/Precautions: HTN, DM, hx of upper cervical fractures, recent neck surgery 6/2020     Plan of care sent to provider Blake Moore):      [x]Faxed  []Co-signature    (attempts: 1[x] 2 []3[])      Plan of care signed:      [x]Yes date: 7/27/20              Plan of care sent to provider Jenni Morrow):      [x]Faxed  []Co-signature    (attempts: 1[x] 2 []3[])     Plan of care signed:      [x]Yes date:   7/23/20      Plan of care sent to provider [facility] fax # [phone]):      [x]Faxed 9/2/20  []Co-signature    (attempts: 1[x] 2 [x] 9/14/20 3[x] 9/21/20)     Plan of care signed:      []Yes date:     [x]No         Progress Note covers period from (if applicable):    []NA    [x]From 9/2/20 to 9/28/20 cervical dx     Next Progress Note due:   By 10/28/20    Visit# / total visits:   Cervical  8/12     Plan for Next Session:     Manual therapy to cervical/thoracic/ribs with caution to C5-6 fusion and upper cervical healed fractures   Progress cervical strength, and L arm/ is using 5 lbs at home. Prone row  5#, 3x15  [x]   Crepitus noted in scapula     Prone horizontal abduction  With scap retraction 3#, 3X15  [x] Unable to maintain form with 5#      Prone scaption  with arm off edge of bed (prone Y with arm off edge) 0#, 3x15   [x]   Cues to keep thumb up      SL ER  2# 2X10 [] With scapular retraction      Shoulder IR with tband Green, 3x12  [x]        Wall push ups  x10  [x]   Significant winging of scapula noted. Wall push up with serratus plus 2x10  Noted significant winging of scapula     Rhythmic stabilization 3x30 sec, distal perturbations []      PNF D1 and D2 UE patterns in   standing                                                          2x10 each   [] Gentle assist to achieve full ROM     Supine active flexion  3# 1x10     Standing Abduction  2x10     Standing  Forward flexion  2x10      Manually resisted scap retraction in SL 2x10, mod resistance  Much crepitus noted   Standing wall slide  2x10     SL HAB (LUE) x10  1# x10  Cuing to incr eccentric control and maintaining scapular retraction with eccentric lowering     Vestibular Diagnosis:    []      Eye Exercises:         Saccades, seated  Pt inst to do this exercise x 1 minute in horizontal direction and x 1 minute in vertical direction. [x]   Reviewed, continue     Corrective saccades, seated, week 2 Pt inst to do this exercise x 2 minute in horizontal direction and x 2 minute in vertical direction, 2x/day  [x]   Reviewed, continue     VOR x 1 viewing, standing, close target Pt inst to do this exercise x 1 minute with horizontal head turns and x 1 minute with vertical head nods. Pt to only go as fast as he/she can while keeping target clear and in focus. Pt inst to do this ex 3x/day. 25 reps/min Horizontal       30 reps/min vertical          VOR x 1 viewing, standing, far target, week 2 Pt inst to do this exercise x 1 minute with horizontal head turns and x 1 minute with vertical head nods. Theraputty:    [x]         Dod[place]idge hunt 10 beans  Green putty         squeeze and rotate putty   \"         Roll out donut and spread with                fingers   \"         Roll out and pinch with fingers   \"         Wrist flexion curls 3# pt doing 3x15  [x]    verbally reviewed today, pt inst to increase to 5#, 3x10       Wrist extension curls  2# 3x10  [x]   Verbally reviewed, remain at 2#       Supination/pronation with elbow at side  3# pt doing 3x15  [x]    verbally reviewed today, pt inst to increase to 5#, 3x10       EZ velcro board  large roller wrist ext/flexion  large roller sup/pron  Key roller  and sup/pron  All to fatigue      Self thoracic mob with towel roll vertically and horizontally  Reviewed, 1-2 minutes, pt given handout  [x]         Supine deep neck flexors chin nod; added  lift (toe peak) with nod  Hold 10 sec, 10 reps 1x/day  [x]         Cervical isometrics in supine with head and neck supported on pillow Reviewed, Light resistance, hold 5 sec, 10 reps into extension and Rot B   [x]        Alternating mid rows at pulleys  5.0 # B 3x10       Mid rows at pulleys  15# L pulley 3x12       Therapeutic Exercise/Home Exercise Program:   25 minutes (2units) ([id number])  HEP has been established, See above under cervical dx,  Reviewed and progressed   Pt inst to do UE and LE strength and ROM exs ONLY 1 TIME PER DAY. Access Code: [email or web address]   URL: [email or web address]   Date: 09/02/2020   Prepared by: Marylee Chasten     Exercises   Circular Shoulder Pendulum with Table Support - 20 reps - 2 sets - 2x daily - 7x weekly   Supine Shoulder Flexion AAROM - 10 reps - 10 hold - 2x daily - 7x weekly   Supine Shoulder External Internal Rotation AAROM with Dowel - 10 reps - 10 hold - 2x daily - 7x weekly   Sleeper Stretch - 10 reps - 10 hold - 2x daily - 7x weekly   Standing Shoulder Row with Anchored Resistance - 10-15 reps - 3 sets - 1x daily - 7x weekly   Bridge - 10-15 reps - 3 sets - 1x daily - 7x weekly   Heel rises with counter support - 10-15 reps - 3 sets - 1x daily - 7x weekly   Supine Single Arm Shoulder Protraction - 10-15 reps - 3 sets - 1x daily - 7x weekly   Prone Shoulder Extension - Single Arm - 10-15 reps - 3 sets - 1x daily - 7x weekly   Supine Triceps Extension with Resistance - 10 reps - 3 sets - 1x daily - 7x weekly   Supine Elbow Flexion Extension with Dumbbell - 10-15 reps - 3 sets - 1x daily - 7x weekly   Sidelying Shoulder ER with Towel and Dumbbell - 10-15 reps - 3 sets - 1x daily - 7x weekly   Prone Shoulder Row - 10-15 reps - 3 sets - 1x daily - 7x weekly   Shoulder Internal Rotation with Resistance - 10-15 reps - 3 sets - 1x daily - 7x weekly   Prone Single Arm Shoulder Horizontal Abduction with Dumbbell - Palm Down - 10-15 reps - 3 sets - 1x daily - 7x weekly   Wall Push Up - 10-15 reps - 3 sets - 1x daily - 7x weekly   Prone Single Arm Shoulder Y with Dumbbell - 10-15 reps - 3 sets - 1x daily - 7x weekly   Supine Shoulder Flexion with Free Weight - 10 reps - 3 sets - 1x daily - 7x weekly   standing shoulder flexion - 10 reps - 3 sets - 1x daily - 7x weekly   Standing Alternating Shoulder Abduction - 10 reps - 3 sets - 1x daily - 7x weekly         Therapeutic Activity:  0 minutes      Gait: 0 minutes      Neuromuscular Re-Education:  0 minutes   (0units)        Canalith Repositioning Procedure:  0 minutes    Manual Therapy:  30 Minutes [id number] (2 units)  PT looked up post accident CT scan for location of upper cervical fractures:   Again demonstrated are acute traumatic fractures of the right lateral mass and anterior arch of C1 extending to the right articular facet as well as a traumatic comminuted fracture of the right transverse process and lateral mass of C2 involving the right vertebral foramen. Unchanged appearance of traumatic fracture of the right occipital condyle.     No change in alignment on flexion or extension views.  No evidence of atlantoaxial instability. Moderate C5-6 and C6-7 disc degenerative changes. Prone thoracic PAs  Prone costotransverse springing B  Upper thoracic PAs/prayer hands  2nd rib mob B good mobility and position  Supine 1st rib mob B  SOR   Gr I OA distraction x 5 reps  Gentle AA rotation PROM B   Supine gentle cervical PROM rot B       Modalities: 0 minutes    Functional Outcome Measure:     Measure Used: Dizziness Handicap Inventory   Score: 46/100  Date Assessed: 7/22/20     Measure Used: Dizziness Handicap Inventory   Score: 60/100  Date Assessed: 8/12/20    Measure Used: Dizziness Handicap Inventory   Score: 28/100  Date Assessed: 9/16/20    Measure Used: Neck disability index  Score: 24/50  Date Assessed: 9/28/20        Assessment/Treatment/Activity Tolerance:          Patients response to treatment:   [x]Patient tolerated treatment well []Patient limited by fatigue   []Patient limited by pain  []Patient limited by other medical complications   []Other:     Goals:     Vestibular GOALS  Discharged 9/16/20  Short Term Goals:   4 weeks MET NOT MET Long Term Goals:  8  weeks MET NOT MET   1). Establish HEP [x]?  []?  1). Pt independent HEP [x]?  []?    2). Increase strength 1/3 grade in areas of deficits [x]?  []?  2). Increase strength to 4+/5 or better [x]?  []?    3). Improve Tinetti to 19/20 [x]?  []?  3). Improve Tinetti to 24/28 PROGRESSING TOWARDS []?  []?    4). Improve DHI score to 36 or less  [x]?  []?  4). Improve DHI score to 26 or less PROGRESSING TOWARDS  []?  []?    5). Pt able to ambulate household distances without AD [x]?  []?  5). Pt able to do flight of steps with reciprocal pattern with 1 rail  [x]?  []?    6). []?  []?  6).   Pt able to ambulate community distances without AD  [x]?  []?        Shoulder  GOALS   Discharged 9/2/20  Short Term Goals:   3  weeks MET Not MET Long Term Goals:    6 weeks MET Not Met   Establish HEP [x]?  []?  Pt independent with HEP [x]?  []?    Pain  5/10 or less [x]?  []?  Pain  3/10 or less [x]?  []?    Increase identified strength deficits 1/3 grade  [x]?  []?  Increase strength to 4+/5 or greater [x]?  []?    Increase UE PROM 20 degrees with IR and ER  And 10 degrees with abduction and flexion  [x]?  []?  Achieve WFL AROM  [x]?  []?    Report increased tolerance to ADLs without increased pain  [x]?  []?  Return to all ADLS without limitation []?  [x]?      []?  []?    []?  []? Cervical GOALS  established 9/2/20   Short Term Goals:   3  weeks MET Not Met Long Term Goals:   6  weeks MET Not Met   Establish HEP [x]?  []?  Pt independent with HEP []?  []?    Pain  4/10 or less PARTIALLY MET []?  []?  Pain  2/10 or less []?  []?    Increase ROM 5 degrees and rotation by 25%  PARTIALLY MET []?  []?  Achieve WFL ROM  []?  []?    Increase identified strength deficits 1/3 grade PARTIALLY MET []?  []?  Increase strength to 5/5  []?  []?    Assess FOM and make LTG [x]?  []?  Improve score on neck disability index to 15/50 or less []?  []?      []?  []?  Increase average  strength L hand by 10# []?         Prognosis: [x]Good   []Fair   []Poor    Patient Requires Follow-up:  [x]Yes for cervical diagnosis   []No    Plan: []Plan of care initiated    [x]Continue per plan of care for cervical diagnosis   [] Alter current plan (see comments)    []Hold pending MD visit []Discharge        Timed Code Treatment Minutes:  55     Total Treatment Minutes:    55     Medicare Cap total YTD:   [x]N/A    Workers Comp Time Stamp  []N/A   Time In: 1330     Time Out: [address]      Electronically signed by:  Cathy Lima, OMT-C, [id number]

## 2020-10-05 ENCOUNTER — HOSPITAL ENCOUNTER (OUTPATIENT)
Dept: PHYSICAL THERAPY | Age: 65
Setting detail: THERAPIES SERIES
Discharge: HOME | End: 2020-10-05
Payer: COMMERCIAL

## 2020-10-05 PROCEDURE — 97110 THERAPEUTIC EXERCISES: CPT

## 2020-10-05 PROCEDURE — 97140 MANUAL THERAPY 1/> REGIONS: CPT

## 2020-10-05 NOTE — FLOWSHEET NOTE
Outpatient Physical Therapy     [x] Daily Treatment Note   [] Progress Note   [] Discharge Note     Date:  10/5/2020    Patient Name:  Shahzad Brewer         YOB: 1955    Medical Diagnosis: unspecified disorder of vestibular system H81.90 ; ORIF clavicle, I S42.022K displaced fx shaft of L clavicle                                                    Treatment Diagnosis:  Imbalance, dizziness/vertigo ;  L shoulder pain, decreased mobility, weakness          Onset Date:    2/14/2019             Referral Date:  7/17/20 (vestibular)  / 7/13/20 (shoulder)              Referring Physician:  Mary Jane Nance (vestibular) / Dr. Zackary Crain (shoulder)                                                    Visits Allowed/Insurance/Certification Information:  Vestibular [facility], 16 visits approved through 9/25/20  / shoulder [facility], 12 visits approved until 8/21/20  /  C9 approved for cervical treatment 2-3x6 09/11/2020-10/31/2020     Restrictions/Precautions: HTN, DM, hx of upper cervical fractures, recent neck surgery 6/2020     Plan of care sent to provider Nivia Wiggins):      [x]Faxed  []Co-signature    (attempts: 1[x] 2 []3[])      Plan of care signed:      [x]Yes date: 7/27/20              Plan of care sent to provider Sampson Mora):      [x]Faxed  []Co-signature    (attempts: 1[x] 2 []3[])     Plan of care signed:      [x]Yes date:   7/23/20      Plan of care sent to provider [facility] fax # [phone]):      [x]Faxed 9/2/20  []Co-signature    (attempts: 1[x] 2 [x] 9/14/20 3[x] 9/21/20)     Plan of care signed:  [x]Yes     PT POC MD CERT signed by Charli Hendrickson 09/22/2020          Progress Note covers period from (if applicable):    []NA    [x]From 9/2/20 to 9/28/20 cervical dx     Next Progress Note due:   By 10/28/20    Visit# / total visits:   Cervical  9/12     Plan for Next Session:     Manual therapy to cervical/thoracic/ribs with caution to C5-6 fusion and upper cervical healed fractures   Progress cervical strength, and L arm/ strength    Subjective: This weekend the highest the pain rated was approx a 3/10 on Saturday due to the cold weather. He was out in the garage a lot Saturday, polishing his [place]. Rates pain Currently a 2. Does rate pain lower than a 2 at times. Pain level:  2/10 cervical tension, pain as high as 3/10 since last visit. AT EVAL:   Patient reports L shoulder pain is  0/10 pain at present and  8/10 pain at its worst.  Pt also reports pain from cervical spine that radiates to L trap and goes to L elbow, pt reports constant numbness ventral fingers L hand and weakness into L hand. Also neck  pain,                                      4/10 pain at present   6/10 pain at its worst      Objective:     Operation:    1. C5/C6 anterior cervical discectomies and foraminotomies. 2. C5/C6 anterior cervical discectomy and fusion with [employer] Interbody cage and plating system. 3. Use of intraoperative microscope  4. Use of intraoperative fluoroscopy      Exercises:    Exercises in bold performed in department today. Items not bolded are carried forward from prior visits for continuity of the record. Exercise/Equipment Resistance/Repetitions HEP Other comments       []      L Shoulder Diagnosis:   []       pendulums 20x CW and 20x CCW [x] Reviewed       flexion supine x10 [x] Advised to progress into newly obtained range      ER with cane at 90 deg abd 10 x 10 sec [x] Reviewed and corrected. Tolerating well. Sleeper stretch 10 x 10 sec [x] Reviewed and corrected form       Serratus ceilng punch         Mid rows (seated ok for now) Red, 1x10 [x] Progressed to blue band      Serratus punch  5#, 5x12 (pt did more than asked) [x]       Triceps ext supinea 5# 3x12-15        Prone shoulder extension to hip with scap retraction  5#, 3x15 [x] Cues to maintain retraction, to reduce crepitus with extension. Advanced to 5 lbs to check form because pt states he is using 5 lbs at home. Prone row  5#, 3x15  [x]   Crepitus noted in scapula     Prone horizontal abduction  With scap retraction 3#, 3X15  [x] Unable to maintain form with 5#      Prone scaption  with arm off edge of bed (prone Y with arm off edge) 0#, 3x15   [x]   Cues to keep thumb up      SL ER  2# 2X10 [] With scapular retraction      Shoulder IR with tband Green, 3x12  [x]        Wall push ups  x10  [x]   Significant winging of scapula noted. Wall push up with serratus plus 2x10  Noted significant winging of scapula     Rhythmic stabilization 3x30 sec, distal perturbations []      PNF D1 and D2 UE patterns in   standing                                                          2x10 each   [] Gentle assist to achieve full ROM     Supine active flexion  3# 1x10     Standing Abduction  2x10     Standing  Forward flexion  2x10      Manually resisted scap retraction in SL 2x10, mod resistance  Much crepitus noted   Standing wall slide  2x10     SL HAB (LUE) x10  1# x10  Cuing to incr eccentric control and maintaining scapular retraction with eccentric lowering     Vestibular Diagnosis:    []      Eye Exercises:         Saccades, seated  Pt inst to do this exercise x 1 minute in horizontal direction and x 1 minute in vertical direction. [x]   Reviewed, continue     Corrective saccades, seated, week 2 Pt inst to do this exercise x 2 minute in horizontal direction and x 2 minute in vertical direction, 2x/day  [x]   Reviewed, continue     VOR x 1 viewing, standing, close target Pt inst to do this exercise x 1 minute with horizontal head turns and x 1 minute with vertical head nods. Pt to only go as fast as he/she can while keeping target clear and in focus. Pt inst to do this ex 3x/day. 25 reps/min Horizontal       30 reps/min vertical          VOR x 1 viewing, standing, far target, week 2 Pt inst to do this exercise x 1 minute with horizontal head turns and x 1 minute with vertical head nods.   Pt to only go as fast as he/she can while keeping target clear and in focus. Pt inst to do this ex 2x/day. NT today reps Horizontal  NT today reps vertical      VOR Checkerboard, week 4 close target Pt to cont to practice to keep in focus  Continue   VOR x 2 viewing, week 4 1 min each direction standing   continue     Habituation Exercises:         Rolling B - pt states he has not been doing this ex even though PT has been asking him about it each visit and he states he is still dizzy with exercise. Reviewed, 5 repetitions (Rolling R, center, rolling L, center)   Pt to do 2 times per day  [x]   Pt to do some with eyes open and some with eyes closed. supine to longsit and vice versa- pt states he has been doing this one but needed cues  Reviewed, 5 repetitions (sit up, lie back down)   Pt to do 2 times per day  continue   Nose to knee5 repetitions (nose to R knee, back up, nose to L knee and back up), 2x/day. continue    Discontinued    PNF Wall taps in standing 2x10       scifit  Seat 8, with arms Level 4.0, 10 minutes   At end of session       Bridges  2x15 [x]      Standing heel raises 2x15 [x]      Standing ABD  2#, 2x10 B []      Step ups 8\" 2x10 B  [] Single UE     Step ups onto 6\" step, blue foam 2x20 []      Static stance on Blue foam  Conditions   Pt tends to lean posteriorly.      March in place on blue foam  2x20   Single R UE    Static Stance on firm surface ;   Repeated On blue foam Feet apart: conditions 1-6  Feet together: conditions 1-6    In corner    Step up and over, 8\" step  Lateral step up and over 6\" step 1x10 B UE; 1x10 L UE alternating pattern  1x10 BUE; 1x10 RUE    Single UE for balance   Lateral eccentric step down, 4\" step 2x10 B  Bilateral UE for balance   Lateral cone step over at // bars 2x  Bilateral UE   Step over cone, forward @ // bars  3x B  B UE, Single UE, No UE          Cervical Diagnosis:        cervical AROM  As tolerated      gripping stress ball  As tolerated      Theraputty:    [x] Malinda Miners martin 10 beans  Green putty         squeeze and rotate putty   \"         Roll out donut and spread with                fingers   \"         Roll out and pinch with fingers   \"         Wrist flexion curls 3# pt doing 3x15  [x]    verbally reviewed today, pt inst to increase to 5#, 3x10       Wrist extension curls  2# 3x10  [x]   Verbally reviewed, remain at 2#       Supination/pronation with elbow at side  3# pt doing 3x15  [x]    verbally reviewed today, pt inst to increase to 5#, 3x10       EZ velcro board  large roller wrist ext/flexion  large roller sup/pron  Key roller  and sup/pron  All to fatigue      Self thoracic mob with towel roll vertically and horizontally  Reviewed, 1-2 minutes, pt given handout  [x]         Supine deep neck flexors chin nod; added  lift (toe peak) with nod  Hold 10 sec, 10 reps 1x/day  [x]         Cervical isometrics in supine with head and neck supported on pillow Reviewed, Light resistance, hold 5 sec, 10 reps into extension and Rot B   [x]            Prone: scap retraction in tripod position  2x15  Noted marked improvement in winging and crepitus vs. Prior visits with this therapist.   Prone Cervical retraction in tripod 2x15       Alternating mid rows at pulleys  5.0 # B 3x10       Mid rows at pulleys  15# L pulley 3x12  Recommend increase to BLUE TB at home if tolerating 15# in clinic     Therapeutic Exercise/Home Exercise Program:   20 minutes (1unit) Timestamp [id number]  HEP has been established, See above under cervical dx,  Reviewed and progressed   Pt inst to do UE and LE strength and ROM exs ONLY 1 TIME PER DAY. Access Code: [email or web address]   URL: [email or web address]   Date: 09/02/2020   Prepared by: Marylee Chasten     Exercises   Circular Shoulder Pendulum with Table Support - 20 reps - 2 sets - 2x daily - 7x weekly   Supine Shoulder Flexion AAROM - 10 reps - 10 hold - 2x daily - 7x weekly   Supine Shoulder External Internal Rotation AAROM with Dowel - 10 reps - 10 hold - 2x daily - 7x weekly   Sleeper Stretch - 10 reps - 10 hold - 2x daily - 7x weekly   Standing Shoulder Row with Anchored Resistance - 10-15 reps - 3 sets - 1x daily - 7x weekly   Bridge - 10-15 reps - 3 sets - 1x daily - 7x weekly   Heel rises with counter support - 10-15 reps - 3 sets - 1x daily - 7x weekly   Supine Single Arm Shoulder Protraction - 10-15 reps - 3 sets - 1x daily - 7x weekly   Prone Shoulder Extension - Single Arm - 10-15 reps - 3 sets - 1x daily - 7x weekly   Supine Triceps Extension with Resistance - 10 reps - 3 sets - 1x daily - 7x weekly   Supine Elbow Flexion Extension with Dumbbell - 10-15 reps - 3 sets - 1x daily - 7x weekly   Sidelying Shoulder ER with Towel and Dumbbell - 10-15 reps - 3 sets - 1x daily - 7x weekly   Prone Shoulder Row - 10-15 reps - 3 sets - 1x daily - 7x weekly   Shoulder Internal Rotation with Resistance - 10-15 reps - 3 sets - 1x daily - 7x weekly   Prone Single Arm Shoulder Horizontal Abduction with Dumbbell - Palm Down - 10-15 reps - 3 sets - 1x daily - 7x weekly   Wall Push Up - 10-15 reps - 3 sets - 1x daily - 7x weekly   Prone Single Arm Shoulder Y with Dumbbell - 10-15 reps - 3 sets - 1x daily - 7x weekly   Supine Shoulder Flexion with Free Weight - 10 reps - 3 sets - 1x daily - 7x weekly   standing shoulder flexion - 10 reps - 3 sets - 1x daily - 7x weekly   Standing Alternating Shoulder Abduction - 10 reps - 3 sets - 1x daily - 7x weekly         Therapeutic Activity:  0 minutes      Gait: 0 minutes      Neuromuscular Re-Education:  0 minutes   (0units)        Canalith Repositioning Procedure:  0 minutes    Manual Therapy:  25 Minutes (2 units) Timestamp [id number]  PT looked up post accident CT scan for location of upper cervical fractures:   Again demonstrated are acute traumatic fractures of the right lateral mass and anterior arch of C1 extending to the right articular facet as well as a traumatic comminuted fracture of the right transverse process and lateral mass of C2 involving the right vertebral foramen. Unchanged appearance of traumatic fracture of the right occipital condyle. No change in alignment on flexion or extension views.  No evidence of atlantoaxial instability. Moderate C5-6 and C6-7 disc degenerative changes. Prone thoracic PAs  Prone costotransverse springing B  Upper thoracic PAs/prayer hands  2nd rib mob B good mobility and position  Supine 1st rib mob B  SOR   Gr I OA distraction x 5 reps  Gentle AA rotation PROM B   Supine gentle cervical PROM rot B       Modalities: 0 minutes    Functional Outcome Measure:     Measure Used: Dizziness Handicap Inventory   Score: 46/100  Date Assessed: 7/22/20     Measure Used: Dizziness Handicap Inventory   Score: 60/100  Date Assessed: 8/12/20    Measure Used: Dizziness Handicap Inventory   Score: 28/100  Date Assessed: 9/16/20    Measure Used: Neck disability index  Score: 24/50  Date Assessed: 9/28/20        Assessment/Treatment/Activity Tolerance:          Patients response to treatment:   [x]Patient tolerated treatment well []Patient limited by fatigue   []Patient limited by pain  []Patient limited by other medical complications   []Other:     Goals:     Vestibular GOALS  Discharged 9/16/20  Short Term Goals:   4 weeks MET NOT MET Long Term Goals:  8  weeks MET NOT MET   1). Establish HEP [x]?  []?  1). Pt independent HEP [x]?  []?    2). Increase strength 1/3 grade in areas of deficits [x]?  []?  2). Increase strength to 4+/5 or better [x]?  []?    3). Improve Tinetti to 19/20 [x]?  []?  3). Improve Tinetti to 24/28 PROGRESSING TOWARDS []?  []?    4). Improve DHI score to 36 or less  [x]?  []?  4). Improve DHI score to 26 or less PROGRESSING TOWARDS  []?  []?    5). Pt able to ambulate household distances without AD [x]?  []?  5). Pt able to do flight of steps with reciprocal pattern with 1 rail  [x]?  []?    6). []?  []?  6).   Pt able to ambulate community distances without AD  [x]?  []?        Shoulder  GOALS   Discharged 9/2/20  Short Term Goals:   3  weeks MET Not MET Long Term Goals:    6 weeks MET Not Met   Establish HEP [x]?  []?  Pt independent with HEP [x]?  []?    Pain  5/10 or less [x]?  []?  Pain  3/10 or less [x]?  []?    Increase identified strength deficits 1/3 grade  [x]?  []?  Increase strength to 4+/5 or greater [x]?  []?    Increase UE PROM 20 degrees with IR and ER  And 10 degrees with abduction and flexion  [x]?  []?  Achieve WFL AROM  [x]?  []?    Report increased tolerance to ADLs without increased pain  [x]?  []?  Return to all ADLS without limitation []?  [x]?      []?  []?    []?  []? Cervical GOALS  established 9/2/20   Short Term Goals:   3  weeks MET Not Met Long Term Goals:   6  weeks MET Not Met   Establish HEP [x]?  []?  Pt independent with HEP []?  []?    Pain  4/10 or less PARTIALLY MET []?  []?  Pain  2/10 or less []?  []?    Increase ROM 5 degrees and rotation by 25%  PARTIALLY MET []?  []?  Achieve WFL ROM  []?  []?    Increase identified strength deficits 1/3 grade PARTIALLY MET []?  []?  Increase strength to 5/5  []?  []?    Assess FOM and make LTG [x]?  []?  Improve score on neck disability index to 15/50 or less []?  []?      []?  []?  Increase average  strength L hand by 10# []?         Prognosis: [x]Good   []Fair   []Poor    Patient Requires Follow-up:  [x]Yes for cervical diagnosis   []No    Plan: []Plan of care initiated    [x]Continue per plan of care for cervical diagnosis   [] Alter current plan (see comments)    []Hold pending MD visit []Discharge        Timed Code Treatment Minutes: 45     Total Treatment Minutes:    45     Medicare Cap total YTD:   [x]N/A    Workers Comp Time Stamp  []N/A   Time In: 1330     Time Out: 4038     Electronically signed by:  Finesse Mclaughlin, PT, MPT, U-

## 2020-10-07 ENCOUNTER — HOSPITAL ENCOUNTER (OUTPATIENT)
Dept: PHYSICAL THERAPY | Age: 65
Setting detail: THERAPIES SERIES
Discharge: HOME | End: 2020-10-07
Payer: COMMERCIAL

## 2020-10-07 LAB
AVERAGE GLUCOSE: NORMAL
HBA1C MFR BLD: 8.6 %

## 2020-10-07 PROCEDURE — 97110 THERAPEUTIC EXERCISES: CPT

## 2020-10-07 PROCEDURE — 97140 MANUAL THERAPY 1/> REGIONS: CPT
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
## 2020-10-09 ENCOUNTER — HOSPITAL ENCOUNTER (OUTPATIENT)
Dept: OCCUPATIONAL THERAPY | Age: 65
Setting detail: THERAPIES SERIES
Discharge: HOME | End: 2020-10-09
Payer: COMMERCIAL

## 2020-10-09 PROCEDURE — 97165 OT EVAL LOW COMPLEX 30 MIN: CPT

## 2020-10-09 PROCEDURE — 97537 COMMUNITY/WORK REINTEGRATION: CPT
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
## 2020-10-12 ENCOUNTER — HOSPITAL ENCOUNTER (OUTPATIENT)
Dept: PHYSICAL THERAPY | Age: 65
Setting detail: THERAPIES SERIES
Discharge: HOME | End: 2020-10-12
Payer: COMMERCIAL

## 2020-10-12 PROCEDURE — 97140 MANUAL THERAPY 1/> REGIONS: CPT

## 2020-10-12 PROCEDURE — 97110 THERAPEUTIC EXERCISES: CPT

## 2020-10-12 NOTE — FLOWSHEET NOTE
Outpatient Physical Therapy     [x] Daily Treatment Note   [] Progress Note   [] Discharge Note     Date:  10/12/2020    Patient Name:  Abby Yin         YOB: 1955    Medical Diagnosis: unspecified disorder of vestibular system H81.90 ; ORIF clavicle, I S42.022K displaced fx shaft of L clavicle                                                    Treatment Diagnosis:  Imbalance, dizziness/vertigo ;  L shoulder pain, decreased mobility, weakness          Onset Date:    2/14/2019             Referral Date:  7/17/20 (vestibular)  / 7/13/20 (shoulder)              Referring Physician:  Shey Welch (vestibular) / Dr. Jeannette Pérez (shoulder)                                                    Visits Allowed/Insurance/Certification Information:  Vestibular [facility], 16 visits approved through 9/25/20  / shoulder [facility], 12 visits approved until 8/21/20  /  C9 approved for cervical treatment 2-3x6 09/11/2020-10/31/2020     Restrictions/Precautions: HTN, DM, hx of upper cervical fractures, recent neck surgery 6/2020     Plan of care sent to provider Balke Moore):      [x]Faxed  []Co-signature    (attempts: 1[x] 2 []3[])      Plan of care signed:      [x]Yes date: 7/27/20              Plan of care sent to provider Jenni Morrwo):      [x]Faxed  []Co-signature    (attempts: 1[x] 2 []3[])     Plan of care signed:      [x]Yes date:   7/23/20      Plan of care sent to provider [facility] fax # [phone]):      [x]Faxed 9/2/20  []Co-signature    (attempts: 1[x] 2 [x] 9/14/20 3[x] 9/21/20)     Plan of care signed:  [x]Yes     PT POC MD CERT signed by Marika Client 09/22/2020          Progress Note covers period from (if applicable):    []NA    [x]From 9/2/20 to 9/28/20 cervical dx     Next Progress Note due:   By 10/28/20    Visit# / total visits:   Cervical  12/12     Plan for Next Session:     Manual therapy to cervical/thoracic/ribs with caution to C5-6 fusion and upper cervical healed fractures   Progress cervical strength, and L arm/ strength    Subjective:   Pain on both sides of neck 3/10, no meds taken, and does not do so except for occasional alleve. Pain level:  3/10 cervical tension, pain as high as 3/10 since last visit. AT EVAL:   Patient reports L shoulder pain is  0/10 pain at present and  8/10 pain at its worst.  Pt also reports pain from cervical spine that radiates to L trap and goes to L elbow, pt reports constant numbness ventral fingers L hand and weakness into L hand. Also neck  pain,                                      4/10 pain at present   6/10 pain at its worst      Objective:     Operation:    1. C5/C6 anterior cervical discectomies and foraminotomies. 2. C5/C6 anterior cervical discectomy and fusion with [employer] Interbody cage and plating system. 3. Use of intraoperative microscope  4. Use of intraoperative fluoroscopy      Exercises:    Exercises in bold performed in department today. Items not bolded are carried forward from prior visits for continuity of the record. Exercise/Equipment Resistance/Repetitions HEP Other comments       []      L Shoulder Diagnosis:   []       pendulums 20x CW and 20x CCW [x] Reviewed       flexion supine x10 [x] Advised to progress into newly obtained range      ER with cane at 90 deg abd 10 x 10 sec [x] Reviewed and corrected. Tolerating well. Sleeper stretch 10 x 10 sec [x] Reviewed and corrected form       Serratus ceilng punch         Mid rows (seated ok for now) Red, 1x10 [x] Progressed to blue band      Serratus punch  5#, 5x12 (pt did more than asked) [x]       Triceps ext supinea 5# 3x12-15        Prone shoulder extension to hip with scap retraction  5#, 3x15 [x] Cues to maintain retraction, to reduce crepitus with extension. Advanced to 5 lbs to check form because pt states he is using 5 lbs at home.      Prone row  5#, 3x15  [x]   Crepitus noted in scapula     Prone horizontal abduction  With scap retraction 3#, 3X15  [x] Unable to maintain form with 5#      Prone scaption  with arm off edge of bed (prone Y with arm off edge) 0#, 3x15   [x]   Cues to keep thumb up      SL ER  2# 2X10 [] With scapular retraction      Shoulder IR with tband Green, 3x12  [x]        Wall push ups  x10  [x]   Significant winging of scapula noted. Wall push up with serratus plus 2x10  Noted significant winging of scapula     Rhythmic stabilization 3x30 sec, distal perturbations []      PNF D1 and D2 UE patterns in   standing                                                          2x10 each   [] Gentle assist to achieve full ROM     Supine active flexion  3# 1x10     Standing Abduction  2x10     Standing  Forward flexion  2x10      Manually resisted scap retraction in SL 2x10, mod resistance  Much crepitus noted   Standing wall slide  2x10     SL HAB (LUE) x10  1# x10  Cuing to incr eccentric control and maintaining scapular retraction with eccentric lowering     Vestibular Diagnosis:    []      Eye Exercises:         Saccades, seated  Pt inst to do this exercise x 1 minute in horizontal direction and x 1 minute in vertical direction. [x]   Reviewed, continue     Corrective saccades, seated, week 2 Pt inst to do this exercise x 2 minute in horizontal direction and x 2 minute in vertical direction, 2x/day  [x]   Reviewed, continue     VOR x 1 viewing, standing, close target Pt inst to do this exercise x 1 minute with horizontal head turns and x 1 minute with vertical head nods. Pt to only go as fast as he/she can while keeping target clear and in focus. Pt inst to do this ex 3x/day. 25 reps/min Horizontal       30 reps/min vertical          VOR x 1 viewing, standing, far target, week 2 Pt inst to do this exercise x 1 minute with horizontal head turns and x 1 minute with vertical head nods. Pt to only go as fast as he/she can while keeping target clear and in focus. Pt inst to do this ex 2x/day.      NT today reps Horizontal  NT today reps vertical      VOR Checkerboard, week 4 close target Pt to cont to practice to keep in focus  Continue   VOR x 2 viewing, week 4 1 min each direction standing   continue     Habituation Exercises:         Rolling B - pt states he has not been doing this ex even though PT has been asking him about it each visit and he states he is still dizzy with exercise. Reviewed, 5 repetitions (Rolling R, center, rolling L, center)   Pt to do 2 times per day  [x]   Pt to do some with eyes open and some with eyes closed. supine to longsit and vice versa- pt states he has been doing this one but needed cues  Reviewed, 5 repetitions (sit up, lie back down)   Pt to do 2 times per day  continue   Nose to knee5 repetitions (nose to R knee, back up, nose to L knee and back up), 2x/day. continue    Discontinued    PNF Wall taps in standing 2x10       scifit  Seat 8, with arms Level 4.0, 10 minutes   At end of session       Bridges  2x15 [x]      Standing heel raises 2x15 [x]      Standing ABD  2#, 2x10 B []      Step ups 8\" 2x10 B  [] Single UE     Step ups onto 6\" step, blue foam 2x20 []      Static stance on Blue foam  Conditions   Pt tends to lean posteriorly.      March in place on blue foam  2x20   Single R UE    Static Stance on firm surface ;   Repeated On blue foam Feet apart: conditions 1-6  Feet together: conditions 1-6    In corner    Step up and over, 8\" step  Lateral step up and over 6\" step 1x10 B UE; 1x10 L UE alternating pattern  1x10 BUE; 1x10 RUE    Single UE for balance   Lateral eccentric step down, 4\" step 2x10 B  Bilateral UE for balance   Lateral cone step over at // bars 2x  Bilateral UE   Step over cone, forward @ // bars  3x B  B UE, Single UE, No UE          Cervical Diagnosis:        cervical AROM  As tolerated      gripping stress ball  As tolerated      Theraputty:    [x]         [place] 10 beans  Green putty         squeeze and rotate putty   \"         Roll out donut and spread with fingers   \"         Roll out and pinch with fingers   \"         Wrist flexion curls 3# pt doing 3x15  [x]    verbally reviewed today, pt inst to increase to 5#, 3x10       Wrist extension curls  2# 3x10  [x]   Verbally reviewed, remain at 2#       Supination/pronation with elbow at side  3# pt doing 3x15  [x]    verbally reviewed today, pt inst to increase to 5#, 3x10       EZ velcro board  large roller wrist ext/flexion  large roller sup/pron  Key roller  and sup/pron  All to fatigue      Self thoracic mob with towel roll vertically and horizontally  Reviewed, 1-2 minutes, pt given handout  [x]         Supine deep neck flexors chin nod; added  lift (toe peak) with nod  Hold 10 sec, 10 reps 1x/day  [x]         Cervical isometrics in supine with head and neck supported on pillow Reviewed, Light resistance, hold 5 sec, 10 reps into extension and Rot B   [x]      Seated levator stretch head turn L and R flex Small range, no overpressure, arm behind back HEP    Prone: scap retraction in tripod position  2x15  Noted marked improvement in winging and crepitus vs. Prior visits with this therapist.   Prone Cervical retraction in tripod 2x15       Alternating mid rows at pulleys  5.0 # B 3x10       Mid rows at pulleys  15# L pulley 3x12  Recommend increase to BLUE TB at home if tolerating 15# in clinic     Therapeutic Exercise/Home Exercise Program:   25 minutes (2u)  As above bolded; given levator stretch as HEP and written handout given. HEP has been established, See above under cervical dx,  Reviewed and progressed   Pt inst to do UE and LE strength and ROM exs ONLY 1 TIME PER DAY. Access Code: [email or web address]   URL: [email or web address]   Date: 09/02/2020   Prepared by: Dilcia Hill     Exercises   Circular Shoulder Pendulum with Table Support - 20 reps - 2 sets - 2x daily - 7x weekly   Supine Shoulder Flexion AAROM - 10 reps - 10 hold - 2x daily - 7x weekly   Supine Shoulder External Internal Rotation AAROM process and lateral mass of C2 involving the right vertebral foramen. Unchanged appearance of traumatic fracture of the right occipital condyle. No change in alignment on flexion or extension views.  No evidence of atlantoaxial instability. Moderate C5-6 and C6-7 disc degenerative changes. Prone STM to lateral cerv ms and upper traps emphasis on R and into occiput  Prone   Prone thoracic PAs  Prone costotransverse springing B  Upper thoracic PAs/prayer hands  2nd rib mob B good mobility and position  Supine 1st rib mob B  SOR   Gr I OA distraction x 5 reps  Gentle AA rotation PROM B   Supine gentle cervical PROM rot B   cerv retraction with gentle manual tx      Modalities: 0 minutes    Functional Outcome Measure:     Measure Used: Dizziness Handicap Inventory   Score: 46/100  Date Assessed: 7/22/20     Measure Used: Dizziness Handicap Inventory   Score: 60/100  Date Assessed: 8/12/20    Measure Used: Dizziness Handicap Inventory   Score: 28/100  Date Assessed: 9/16/20    Measure Used: Neck disability index  Score: 24/50  Date Assessed: 9/28/20        Assessment/Treatment/Activity Tolerance:        R scapula still elevated mildly before tx and lower post.  Continues to have very limited crom all planes. Patients response to treatment:   [x]Patient tolerated treatment well []Patient limited by fatigue   []Patient limited by pain  []Patient limited by other medical complications   []Other:     Goals:     Vestibular GOALS  Discharged 9/16/20  Short Term Goals:   4 weeks MET NOT MET Long Term Goals:  8  weeks MET NOT MET   1). Establish HEP [x]?  []?  1). Pt independent HEP [x]?  []?    2). Increase strength 1/3 grade in areas of deficits [x]?  []?  2). Increase strength to 4+/5 or better [x]?  []?    3). Improve Tinetti to 19/20 [x]?  []?  3). Improve Tinetti to 24/28 PROGRESSING TOWARDS []?  []?    4). Improve DHI score to 36 or less  [x]?  []?  4).   Improve DHI score to 26 or less PROGRESSING TOWARDS  []?  []?    5). Pt able to ambulate household distances without AD [x]?  []?  5). Pt able to do flight of steps with reciprocal pattern with 1 rail  [x]?  []?    6). []?  []?  6). Pt able to ambulate community distances without AD  [x]?  []?        Shoulder  GOALS   Discharged 9/2/20  Short Term Goals:   3  weeks MET Not MET Long Term Goals:    6 weeks MET Not Met   Establish HEP [x]?  []?  Pt independent with HEP [x]?  []?    Pain  5/10 or less [x]?  []?  Pain  3/10 or less [x]?  []?    Increase identified strength deficits 1/3 grade  [x]?  []?  Increase strength to 4+/5 or greater [x]?  []?    Increase UE PROM 20 degrees with IR and ER  And 10 degrees with abduction and flexion  [x]?  []?  Achieve WFL AROM  [x]?  []?    Report increased tolerance to ADLs without increased pain  [x]?  []?  Return to all ADLS without limitation []?  [x]?      []?  []?    []?  []? Cervical GOALS  established 9/2/20   Short Term Goals:   3  weeks MET Not Met Long Term Goals:   6  weeks MET Not Met   Establish HEP [x]?  []?  Pt independent with HEP []?  []?    Pain  4/10 or less PARTIALLY MET []?  []?  Pain  2/10 or less []?  []?    Increase ROM 5 degrees and rotation by 25%  PARTIALLY MET []?  []?  Achieve WFL ROM  []?  []?    Increase identified strength deficits 1/3 grade PARTIALLY MET []?  []?  Increase strength to 5/5  []?  []?    Assess FOM and make LTG [x]?  []?  Improve score on neck disability index to 15/50 or less []?  []?      []?  []?  Increase average  strength L hand by 10# []?         Prognosis: [x]Good   []Fair   []Poor    Patient Requires Follow-up:  [x]Yes for cervical diagnosis   []No    Plan: []Plan of care initiated    [x]Continue per plan of care for cervical diagnosis   [] Alter current plan (see comments)    []Hold pending MD visit []Discharge        Timed Code Treatment Minutes: 40     Total Treatment Minutes:    40     Medicare Cap total YTD:   [x]N/A    Workers Comp Time Stamp []N/A   Time In: 1334     Time Out: [id number] [phone]     Electronically signed by:  Gilma Sommer, [address]

## 2020-10-14 ENCOUNTER — HOSPITAL ENCOUNTER (OUTPATIENT)
Dept: PHYSICAL THERAPY | Age: 65
Setting detail: THERAPIES SERIES
Discharge: HOME | End: 2020-10-14
Payer: COMMERCIAL

## 2020-10-14 PROCEDURE — 97140 MANUAL THERAPY 1/> REGIONS: CPT

## 2020-10-14 NOTE — PROGRESS NOTES
Outpatient Physical Therapy     [x] Daily Treatment Note   [x] Progress Note   [] Discharge Note     Date:  10/14/2020    Patient Name:  Sharad Dean         YOB: 1955    Medical Diagnosis: unspecified disorder of vestibular system H81.90 ; ORIF clavicle, I S42.022K displaced fx shaft of L clavicle                                                    Treatment Diagnosis:  Imbalance, dizziness/vertigo ;  L shoulder pain, decreased mobility, weakness          Onset Date:    2/14/2019             Referral Date:  7/17/20 (vestibular)  / 7/13/20 (shoulder)              Referring Physician:  Virginia Guillen (vestibular) / Dr. Disha Murrieta (shoulder)                                                    Visits Allowed/Insurance/Certification Information:  Vestibular [facility], 16 visits approved through 9/25/20  / shoulder BW, 12 visits approved until 8/21/20  /  C9 approved for cervical treatment 2-3x6 09/11/2020-10/31/2020     Restrictions/Precautions: HTN, DM, hx of upper cervical fractures, recent neck surgery 6/2020     Plan of care sent to provider Carito Quick):      [x]Faxed  []Co-signature    (attempts: 1[x] 2 []3[])      Plan of care signed:      [x]Yes date: 7/27/20              Plan of care sent to provider Luz Marina Du):      [x]Faxed  []Co-signature    (attempts: 1[x] 2 []3[])     Plan of care signed:      [x]Yes date:   7/23/20      Plan of care sent to provider [facility] fax # [phone]):      [x]Faxed 9/2/20  []Co-signature    (attempts: 1[x] 2 [x] 9/14/20 3[x] 9/21/20)     Plan of care signed:  [x]Yes     PT POC MD CERT signed by Gisela Amezcua 09/22/2020          Progress Note covers period from (if applicable):    []NA    [x]From 9/2/20 to 9/28/20 cervical dx     Next Progress Note due:   By 10/31/2020 (end of approval date)    Visit# / total visits:   Cervical  13/12-18 (C9 approved for cervical treatment 2-3x6 09/11/2020-10/31/2020)    Plan for Next Session:     Manual therapy to cervical/thoracic/ribs with caution to C5-6 fusion and upper cervical healed fractures   Progress cervical strength, and L arm/ strength    Subjective:   Pain on both sides of neck 2-3/10, no meds taken, and does not do so except for occasional alleve. Pain level:  2-3/10 cervical tension, pain as high as 4/10 since last visit when 1 st when wakes up. AT EVAL:   Patient reports L shoulder pain is  0/10 pain at present and  8/10 pain at its worst.  Pt also reports pain from cervical spine that radiates to L trap and goes to L elbow, pt reports constant numbness ventral fingers L hand and weakness into L hand. Also neck  pain,                                      4/10 pain at present   6/10 pain at its worst      Objective:     Operation:    1. C5/C6 anterior cervical discectomies and foraminotomies. 2. C5/C6 anterior cervical discectomy and fusion with [employer] Interbody cage and plating system. 3. Use of intraoperative microscope  4. Use of intraoperative fluoroscopy    Range Tested MMT / Resisted PROM AROM Comments   *denotes pain Left Right Left Right Left Right     Shoulder Flexion 4-   170 170 120 162     Shoulder Extension 5       78 77     Shoulder Abduction  C5 4-   135 126 90 173     Shoulder Adduction  5               Shoulder IR 4-   65 @ 90 27 @ 90 T10 T12     Shoulder ER 5   80 @ 90 90 @ 90 C4-C5 T4     Elbow Flexion  C6 5               Elbow Extension C7 5               * in avalible range  Cervical Range of Motion: 10% bilateral rotation    Balance: noted posterior positioning with sit to stand noted and maintaining balance with straight cane. Exercises:    Exercises in bold performed in department today. Items not bolded are carried forward from prior visits for continuity of the record.   Exercise/Equipment Resistance/Repetitions HEP Other comments       []      L Shoulder Diagnosis:   []       pendulums 20x CW and 20x CCW [x] Reviewed       flexion supine x10 [x] Advised to progress into newly obtained range      ER with cane at 90 deg abd 10 x 10 sec [x] Reviewed and corrected. Tolerating well. Sleeper stretch 10 x 10 sec [x] Reviewed and corrected form       Serratus ceilng punch         Mid rows (seated ok for now) Red, 1x10 [x] Progressed to blue band      Serratus punch  5#, 5x12 (pt did more than asked) [x]       Triceps ext supinea 5# 3x12-15        Prone shoulder extension to hip with scap retraction  5#, 3x15 [x] Cues to maintain retraction, to reduce crepitus with extension. Advanced to 5 lbs to check form because pt states he is using 5 lbs at home. Prone row  5#, 3x15  [x]   Crepitus noted in scapula     Prone horizontal abduction  With scap retraction 3#, 3X15  [x] Unable to maintain form with 5#      Prone scaption  with arm off edge of bed (prone Y with arm off edge) 0#, 3x15   [x]   Cues to keep thumb up      SL ER  2# 2X10 [] With scapular retraction      Shoulder IR with tband Green, 3x12  [x]        Wall push ups  x10  [x]   Significant winging of scapula noted. Wall push up with serratus plus 2x10  Noted significant winging of scapula     Rhythmic stabilization 3x30 sec, distal perturbations []      PNF D1 and D2 UE patterns in   standing                                                          2x10 each   [] Gentle assist to achieve full ROM     Supine active flexion  3# 1x10     Standing Abduction  2x10     Standing  Forward flexion  2x10      Manually resisted scap retraction in SL 2x10, mod resistance  Much crepitus noted   Standing wall slide  2x10     SL HAB (LUE) x10  1# x10  Cuing to incr eccentric control and maintaining scapular retraction with eccentric lowering     Vestibular Diagnosis:    []      Eye Exercises:         Saccades, seated  Pt inst to do this exercise x 1 minute in horizontal direction and x 1 minute in vertical direction.    [x]   Reviewed, continue     Corrective saccades, seated, week 2 Pt inst to do this exercise x 2 minute in horizontal direction and x 2 minute in vertical direction, 2x/day  [x]   Reviewed, continue     VOR x 1 viewing, standing, close target Pt inst to do this exercise x 1 minute with horizontal head turns and x 1 minute with vertical head nods. Pt to only go as fast as he/she can while keeping target clear and in focus. Pt inst to do this ex 3x/day. 25 reps/min Horizontal       30 reps/min vertical          VOR x 1 viewing, standing, far target, week 2 Pt inst to do this exercise x 1 minute with horizontal head turns and x 1 minute with vertical head nods. Pt to only go as fast as he/she can while keeping target clear and in focus. Pt inst to do this ex 2x/day. NT today reps Horizontal  NT today reps vertical      VOR Checkerboard, week 4 close target Pt to cont to practice to keep in focus  Continue   VOR x 2 viewing, week 4 1 min each direction standing   continue     Habituation Exercises:         Rolling B - pt states he has not been doing this ex even though PT has been asking him about it each visit and he states he is still dizzy with exercise. Reviewed, 5 repetitions (Rolling R, center, rolling L, center)   Pt to do 2 times per day  [x]   Pt to do some with eyes open and some with eyes closed. supine to longsit and vice versa- pt states he has been doing this one but needed cues  Reviewed, 5 repetitions (sit up, lie back down)   Pt to do 2 times per day  continue   Nose to knee5 repetitions (nose to R knee, back up, nose to L knee and back up), 2x/day. continue    Discontinued    PNF Wall taps in standing 2x10       scifit  Seat 8, with arms Level 4.0, 10 minutes   At end of session       Bridges  2x15 [x]      Standing heel raises 2x15 [x]      Standing ABD  2#, 2x10 B []      Step ups 8\" 2x10 B  [] Single UE     Step ups onto 6\" step, blue foam 2x20 []      Static stance on Blue foam  Conditions   Pt tends to lean posteriorly.      March in place on blue foam  2x20   Single R UE    Static Stance on firm surface ;   Repeated On blue foam Feet apart: conditions 1-6  Feet together: conditions 1-6    In corner    Step up and over, 8\" step  Lateral step up and over 6\" step 1x10 B UE; 1x10 L UE alternating pattern  1x10 BUE; 1x10 RUE    Single UE for balance   Lateral eccentric step down, 4\" step 2x10 B  Bilateral UE for balance   Lateral cone step over at // bars 2x  Bilateral UE   Step over cone, forward @ // bars  3x B  B UE, Single UE, No UE          Cervical Diagnosis:        cervical AROM  As tolerated      gripping stress ball  As tolerated      Theraputty:    [x]         Gray hunt 10 beans  Green putty         squeeze and rotate putty   \"         Roll out donut and spread with                fingers   \"         Roll out and pinch with fingers   \"         Wrist flexion curls 3# pt doing 3x15  [x]    verbally reviewed today, pt inst to increase to 5#, 3x10       Wrist extension curls  2# 3x10  [x]   Verbally reviewed, remain at 2#       Supination/pronation with elbow at side  3# pt doing 3x15  [x]    verbally reviewed today, pt inst to increase to 5#, 3x10       EZ velcro board  large roller wrist ext/flexion  large roller sup/pron  Key roller  and sup/pron  All to fatigue      Self thoracic mob with towel roll vertically and horizontally  Reviewed, 1-2 minutes, pt given handout  [x]         Supine deep neck flexors chin nod; added  lift (toe peak) with nod  Hold 10 sec, 10 reps 1x/day  [x]         Cervical isometrics in supine with head and neck supported on pillow Reviewed, Light resistance, hold 5 sec, 10 reps into extension and Rot B   [x]      Seated levator stretch head turn L and R flex Small range, no overpressure, arm behind back HEP    Prone: scap retraction in tripod position  2x15  Noted marked improvement in winging and crepitus vs. Prior visits with this therapist.   Prone Cervical retraction in tripod 2x15       Alternating mid rows at pulleys  5.0 # B 3x10 Shoulder Flexion with Free Weight - 10 reps - 3 sets - 1x daily - 7x weekly   standing shoulder flexion - 10 reps - 3 sets - 1x daily - 7x weekly   Standing Alternating Shoulder Abduction - 10 reps - 3 sets - 1x daily - 7x weekly         Therapeutic Activity:  0 minutes      Gait: 0 minutes      Neuromuscular Re-Education:  0 minutes   (0units)        Canalith Repositioning Procedure:  0 minutes    Manual Therapy:  52 Minutes (3 units)   PT looked up post accident CT scan for location of upper cervical fractures:   Again demonstrated are acute traumatic fractures of the right lateral mass and anterior arch of C1 extending to the right articular facet as well as a traumatic comminuted fracture of the right transverse process and lateral mass of C2 involving the right vertebral foramen. Unchanged appearance of traumatic fracture of the right occipital condyle. No change in alignment on flexion or extension views.  No evidence of atlantoaxial instability. Moderate C5-6 and C6-7 disc degenerative changes. Prone STM to lateral cerv ms and upper traps emphasis on R and into occiput  Prone   Prone thoracic PAs  Prone costotransverse springing B  Upper thoracic PAs/prayer hands  2nd rib mob B good mobility and position  Supine 1st rib mob B  SOR   Gr I OA distraction x 5 reps  Gentle AA rotation PROM B   Supine gentle cervical PROM rot B   cerv retraction with gentle manual tx  See objective for assessment    Modalities: 0 minutes    Functional Outcome Measure:     Measure Used: Dizziness Handicap Inventory   Score: 46/100  Date Assessed: 7/22/20     Measure Used: Dizziness Handicap Inventory   Score: 60/100  Date Assessed: 8/12/20    Measure Used: Dizziness Handicap Inventory   Score: 28/100  Date Assessed: 9/16/20    Measure Used: Neck disability index  Score: 24/50  Date Assessed: 9/28/20        Assessment/Treatment/Activity Tolerance:   Decrease note in left shoulder flexion and abduction.  Limited mobility of upper cervical spine and noted tension headaches with tightness in suboccipital region. Patients response to treatment:   [x]Patient tolerated treatment well []Patient limited by fatigue   []Patient limited by pain  []Patient limited by other medical complications   []Other:     Goals:     Vestibular GOALS  Discharged 9/16/20  Short Term Goals:   4 weeks MET NOT MET Long Term Goals:  8  weeks MET NOT MET   1). Establish HEP [x]?  []?  1). Pt independent HEP [x]?  []?    2). Increase strength 1/3 grade in areas of deficits [x]?  []?  2). Increase strength to 4+/5 or better [x]?  []?    3). Improve Tinetti to 19/28 [x]?  []?  3). Improve Tinetti to 24/28 PROGRESSING TOWARDS []?  []?    4). Improve DHI score to 36 or less  [x]?  []?  4). Improve DHI score to 26 or less PROGRESSING TOWARDS  []?  []?    5). Pt able to ambulate household distances without AD [x]?  []?  5). Pt able to do flight of steps with reciprocal pattern with 1 rail  [x]?  []?    6). []?  []?  6). Pt able to ambulate community distances without AD  [x]?  []?        Shoulder  GOALS   Discharged 9/2/20  Short Term Goals:   3  weeks MET Not MET Long Term Goals:    6 weeks MET Not Met   Establish HEP [x]?  []?  Pt independent with HEP [x]?  []?    Pain  5/10 or less [x]?  []?  Pain  3/10 or less [x]?  []?    Increase identified strength deficits 1/3 grade  [x]?  []?  Increase strength to 4+/5 or greater [x]?  []?    Increase UE PROM 20 degrees with IR and ER  And 10 degrees with abduction and flexion  [x]?  []?  Achieve WFL AROM  [x]?  []?    Report increased tolerance to ADLs without increased pain  [x]?  []?  Return to all ADLS without limitation []?  [x]?      []?  []?    []?  []?      Cervical GOALS  established 9/2/20   Short Term Goals:   3  weeks MET Not Met Long Term Goals:   6  weeks MET Not Met   Establish HEP [x]?  []?  Pt independent with HEP []?  []?    Pain  4/10 or less PARTIALLY MET []?  []?  Pain  2/10 or less []?  []?  Increase ROM 5 degrees and rotation by 25%  PARTIALLY MET []?  []?  Achieve WFL ROM  []?  []?    Increase identified strength deficits 1/3 grade PARTIALLY MET []?  []?  Increase strength to 5/5  []?  []?    Assess FOM and make LTG [x]?  []?  Improve score on neck disability index to 15/50 or less []?  []?      []?  []?  Increase average  strength L hand by 10# []? Prognosis: [x]Good   []Fair   []Poor    Patient Requires Follow-up:  [x]Yes for cervical diagnosis   []No    Plan: []Plan of care initiated    [x]Continue per plan of care for cervical diagnosis   [] Alter current plan (see comments)    []Hold pending MD visit []Discharge   2 times per week for 2 more weeks for manual treatment, progression of exercises for strengthening and ROM. Timed Code Treatment Minutes: 49     Total Treatment Minutes:    49     Medicare Cap total YTD:   [x]N/A    Workers Comp Time Stamp  []N/A   Time In: 13:36   Time Out: 14:25     Electronically signed by:   Jono [address]

## 2020-10-19 ENCOUNTER — HOSPITAL ENCOUNTER (OUTPATIENT)
Dept: PHYSICAL THERAPY | Age: 65
Setting detail: THERAPIES SERIES
Discharge: HOME | End: 2020-10-19
Payer: COMMERCIAL

## 2020-10-19 PROCEDURE — 97140 MANUAL THERAPY 1/> REGIONS: CPT

## 2020-10-19 PROCEDURE — 97110 THERAPEUTIC EXERCISES: CPT
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
## 2020-10-21 ENCOUNTER — HOSPITAL ENCOUNTER (OUTPATIENT)
Dept: PHYSICAL THERAPY | Age: 65
Setting detail: THERAPIES SERIES
Discharge: HOME | End: 2020-10-21
Payer: COMMERCIAL

## 2020-10-21 PROCEDURE — 97140 MANUAL THERAPY 1/> REGIONS: CPT

## 2020-10-21 PROCEDURE — 97110 THERAPEUTIC EXERCISES: CPT

## 2020-10-21 NOTE — FLOWSHEET NOTE
Outpatient Physical Therapy     [x] Daily Treatment Note   [] Progress Note   [] Discharge Note     Date:  10/21/2020    Patient Name:  Hakan Milton         YOB: 1955    Medical Diagnosis: unspecified disorder of vestibular system H81.90 ; ORIF clavicle, I S42.022K displaced fx shaft of L clavicle                                                    Treatment Diagnosis:  Imbalance, dizziness/vertigo ;  L shoulder pain, decreased mobility, weakness          Onset Date:    2/14/2019             Referral Date:  7/17/20 (vestibular)  / 7/13/20 (shoulder)              Referring Physician:  Renato Cheung (vestibular) / Dr. Mindi Mortimer (shoulder)                                                    Visits Allowed/Insurance/Certification Information:  Vestibular [facility], 16 visits approved through 9/25/20  / shoulder [facility], 12 visits approved until 8/21/20  /  C9 approved for cervical treatment 2-3x6 09/11/2020-10/31/2020     Restrictions/Precautions: HTN, DM, hx of upper cervical fractures, recent neck surgery 6/2020     Plan of care sent to provider Taryn Edmond):      [x]Faxed  []Co-signature    (attempts: 1[x] 2 []3[])      Plan of care signed:      [x]Yes date: 7/27/20              Plan of care sent to provider Toño Scott):      [x]Faxed  []Co-signature    (attempts: 1[x] 2 []3[])     Plan of care signed:      [x]Yes date:   7/23/20      Plan of care sent to provider [facility] fax # [phone]):      [x]Faxed 9/2/20  []Co-signature    (attempts: 1[x] 2 [x] 9/14/20 3[x] 9/21/20)     Plan of care signed:  [x]Yes     PT POC MD CERT signed by Orquidea Guadalupe 09/22/2020          Progress Note covers period from (if applicable):    [x]NA    []From     Next Progress Note due:   By 10/31/2020 (end of approval date)    Visit# / total visits:   Cervical  15/12-18 (C9 approved for cervical treatment 2-3x6 09/11/2020-10/31/2020)    Plan for Next Session:     Manual therapy to cervical/thoracic/ribs with caution to C5-6 into newly obtained range      ER with cane at 90 deg abd 10 x 10 sec [x] Reviewed and corrected. Tolerating well. Sleeper stretch 10 x 10 sec [x] Reviewed and corrected form       Serratus ceilng punch         Mid rows (seated ok for now) Red, 1x10 [x] Progressed to blue band      Serratus punch  5#, 5x12 (pt did more than asked) [x]       Triceps ext supinea 5# 3x12-15        Prone shoulder extension to hip with scap retraction  5#, 3x15 [x] Cues to maintain retraction, to reduce crepitus with extension. Advanced to 5 lbs to check form because pt states he is using 5 lbs at home. Prone row  5#, 3x15  [x]   Crepitus noted in scapula     Prone horizontal abduction  With scap retraction 3#, 3X15  [x] Unable to maintain form with 5#      Prone scaption  with arm off edge of bed (prone Y with arm off edge) 0#, 3x15   [x]   Cues to keep thumb up      SL ER  2# 2X10 [] With scapular retraction      Shoulder IR with tband Green, 3x12  [x]        Wall push ups  x10  [x]   Significant winging of scapula noted. Wall push up with serratus plus 2x10  Noted significant winging of scapula     Rhythmic stabilization 3x30 sec, distal perturbations []      PNF D1 and D2 UE patterns in   standing                                                          2x10 each   [] Gentle assist to achieve full ROM     Supine active flexion  3# 1x10     Standing Abduction  2x10     Standing  Forward flexion  2x10      Manually resisted scap retraction in SL 2x10, mod resistance  Much crepitus noted   Standing wall slide  2x10     SL HAB (LUE) x10  1# x10  Cuing to incr eccentric control and maintaining scapular retraction with eccentric lowering     Vestibular Diagnosis:    []      Eye Exercises:         Saccades, seated  Pt inst to do this exercise x 1 minute in horizontal direction and x 1 minute in vertical direction.    [x]   Reviewed, continue     Corrective saccades, seated, week 2 Pt inst to do this exercise x 2 minute in horizontal direction and x 2 minute in vertical direction, 2x/day  [x]   Reviewed, continue     VOR x 1 viewing, standing, close target Pt inst to do this exercise x 1 minute with horizontal head turns and x 1 minute with vertical head nods. Pt to only go as fast as he/she can while keeping target clear and in focus. Pt inst to do this ex 3x/day. 25 reps/min Horizontal       30 reps/min vertical          VOR x 1 viewing, standing, far target, week 2 Pt inst to do this exercise x 1 minute with horizontal head turns and x 1 minute with vertical head nods. Pt to only go as fast as he/she can while keeping target clear and in focus. Pt inst to do this ex 2x/day. NT today reps Horizontal  NT today reps vertical      VOR Checkerboard, week 4 close target Pt to cont to practice to keep in focus  Continue   VOR x 2 viewing, week 4 1 min each direction standing   continue     Habituation Exercises:         Rolling B - pt states he has not been doing this ex even though PT has been asking him about it each visit and he states he is still dizzy with exercise. Reviewed, 5 repetitions (Rolling R, center, rolling L, center)   Pt to do 2 times per day  [x]   Pt to do some with eyes open and some with eyes closed. supine to longsit and vice versa- pt states he has been doing this one but needed cues  Reviewed, 5 repetitions (sit up, lie back down)   Pt to do 2 times per day  continue   Nose to knee5 repetitions (nose to R knee, back up, nose to L knee and back up), 2x/day. continue    Discontinued    PNF Wall taps in standing 2x10       scifit  Seat 8, with arms Level 4.0, 10 minutes   At end of session       Bridges  2x15 [x]      Standing heel raises 2x15 [x]      Standing ABD  2#, 2x10 B []      Step ups 8\" 2x10 B  [] Single UE     Step ups onto 6\" step, blue foam 2x20 []      Static stance on Blue foam  Conditions   Pt tends to lean posteriorly.      March in place on blue foam  2x20   Single R UE Static Stance on firm surface ;   Repeated On blue foam Feet apart: conditions 1-6  Feet together: conditions 1-6    In corner    Step up and over, 8\" step  Lateral step up and over 6\" step 1x10 B UE; 1x10 L UE alternating pattern  1x10 BUE; 1x10 RUE    Single UE for balance   Lateral eccentric step down, 4\" step 2x10 B  Bilateral UE for balance   Lateral cone step over at // bars 2x  Bilateral UE   Step over cone, forward @ // bars  3x B  B UE, Single UE, No UE          Cervical Diagnosis:        cervical AROM  As tolerated      gripping stress ball  As tolerated      Theraputty:    [x]         [place] hunt 10 beans  Green putty         squeeze and rotate putty   \"         Roll out donut and spread with                fingers   \"         Roll out and pinch with fingers   \"         Wrist flexion curls 4# 3x10  [x]          Wrist extension curls  3# 3x10  [x]          Supination/pronation with elbow at side  3# pt doing 3x15  [x]    verbally reviewed today, pt inst to increase to 5#, 3x10       EZ velcro board  large roller wrist ext/flexion  large roller sup/pron  Key roller  and sup/pron  All to fatigue      Self thoracic mob with towel roll vertically and horizontally  Reviewed, 1-2 minutes, pt given handout  [x]   Verbally reviewed      Supine deep neck flexors chin nod; added  lift (toe peak) with nod  Hold 10 sec, 10 reps 1x/day  [x]         Cervical isometrics in supine with head and neck supported on pillow Reviewed, Light resistance, hold 5 sec, 10 reps into extension and Rot B   [x]   Verbally reviewed    Discontinued due to pain, pt unable to do   Quadruped cervical extension to neutral  2x10  [x]      Sidelying cervical lift/ lateral flexors  Hold 10 sec, 10 reps   [x]     Prone: scap retraction in tripod position  2x15  Noted marked improvement in winging and crepitus vs. Prior visits with this therapist.   Prone Cervical retraction in tripod 2x15       Alternating mid rows at pulleys  5.0 # B make LTG [x]?  []?  Improve score on neck disability index to 15/50 or less []?  []?      []?  []?  Increase average  strength L hand by 10# []?         Prognosis: [x]Good   []Fair   []Poor    Patient Requires Follow-up:  [x]Yes for cervical diagnosis   []No    Plan: []Plan of care initiated    [x]Continue per plan of care for cervical diagnosis   [] Alter current plan (see comments)    []Hold pending MD visit []Discharge        Timed Code Treatment Minutes: 56     Total Treatment Minutes:    56     Medicare Cap total YTD:   [x]N/A    Workers Comp Time Stamp  []N/A   Time In: 4962   Time Out: 10[facility]     Electronically signed by:  Portia Moe, [address] [facility], [facility], [id number]

## 2020-10-26 ENCOUNTER — HOSPITAL ENCOUNTER (OUTPATIENT)
Dept: PHYSICAL THERAPY | Age: 65
Setting detail: THERAPIES SERIES
Discharge: HOME | End: 2020-10-26
Payer: COMMERCIAL

## 2020-10-26 PROCEDURE — 97110 THERAPEUTIC EXERCISES: CPT

## 2020-10-26 PROCEDURE — 97140 MANUAL THERAPY 1/> REGIONS: CPT
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
## 2020-10-28 ENCOUNTER — HOSPITAL ENCOUNTER (OUTPATIENT)
Dept: PHYSICAL THERAPY | Age: 65
Setting detail: THERAPIES SERIES
Discharge: HOME | End: 2020-10-28
Payer: COMMERCIAL

## 2020-10-28 PROCEDURE — 97110 THERAPEUTIC EXERCISES: CPT
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
## 2020-11-02 ENCOUNTER — TELEPHONE (OUTPATIENT)
Dept: INTERNAL MEDICINE CLINIC | Age: 65
End: 2020-11-02

## 2020-11-02 NOTE — TELEPHONE ENCOUNTER
Received request from [id number] [facility], [facility] - faxed to [facility] for processing on 11/2/2020. For status update, call [phone] option 1.
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
## 2020-12-10 ENCOUNTER — OFFICE VISIT (OUTPATIENT)
Dept: ENT CLINIC | Age: 65
End: 2020-12-10
Payer: COMMERCIAL

## 2020-12-10 ENCOUNTER — PROCEDURE VISIT (OUTPATIENT)
Dept: AUDIOLOGY | Age: 65
End: 2020-12-10
Payer: COMMERCIAL

## 2020-12-10 VITALS
BODY MASS INDEX: 27.59 KG/M2 | HEIGHT: 74 IN | SYSTOLIC BLOOD PRESSURE: 135 MMHG | TEMPERATURE: 97.3 F | HEART RATE: 109 BPM | WEIGHT: 215 LBS | DIASTOLIC BLOOD PRESSURE: 78 MMHG

## 2020-12-10 PROBLEM — H90.3 SENSORINEURAL HEARING LOSS, BILATERAL: Status: ACTIVE | Noted: 2020-12-10

## 2020-12-10 PROCEDURE — 92567 TYMPANOMETRY: CPT | Performed by: AUDIOLOGIST

## 2020-12-10 PROCEDURE — 99203 OFFICE O/P NEW LOW 30 MIN: CPT | Performed by: OTOLARYNGOLOGY

## 2020-12-10 PROCEDURE — 92557 COMPREHENSIVE HEARING TEST: CPT | Performed by: AUDIOLOGIST
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
## 2020-12-10 NOTE — PATIENT INSTRUCTIONS
Good Communication Strategies    Communication can be challenging for anyone, but can be especially difficult for those with some degree of hearing loss. While we may not be able to control every factor that may lead to difficulty with communication, there are Good Communication Strategies that we can all use in our day-to-day lives. Communication takes both parties working together for it to be successful. Tips as a Listener:   1. Control your environment. It is important to limit the amount of background noise in the room when possible. You should also consider having a good light source in the room to best see the other person. 2. Ask for clarification. Instead of saying \"What?\", you can use parts of what you heard to make a new question. For example, if you heard the word \"Thursday\" but not the rest of the week, you may ask \"What was that about Thursday? \" or \"What did you want to do Thursday? \". This shows the person talking that you are listening and will help them better explain what they are saying. 3. Be an advocate for yourself. If you are hearing but not understanding, tell the other person \"I can hear you, but I need you to slow down when you speak. \"  Or if someone is facing the other direction, say \"I cannot hear you when you are not looking at me when we talk. \"       Tips as a Talker:   - Sit or stand 3 to 6 feet away to maximize audibility         -- It is unrealistic to believe someone else will fully hear your message if you are speaking from across the room or in a different room in the house   - Stay at eye level to help with visual cues   - Make sure you have the persons attention before speaking   - Use facial expressions and gestures to accentuate your message   - Raise your voice slightly (do not scream)   - Speak slowly and distinctly   - Use short, simple sentences   - Rephrase your words if the person is having a hard time understanding you    - To avoid distortion, dont speak aid(s), schedule a \"Hearing Aid Evaluation\" with an audiologist to discuss your lifestyle, features of hearing aid technology, and styles of hearing aids available. It is recommended that you contact your insurance company to determine if you have a hearing aid benefit, as this may dictate who you can see for these services. · Have hearing tests as your doctor suggests. They can show whether your hearing has changed. Your hearing aid may need to be adjusted. · Use other assistive devices as needed. These may include:  ? Telephone amplifiers and hearing aids that can connect to a television, stereo, radio, or microphone. ? Devices that use lights or vibrations. These alert you to the doorbell, a ringing telephone, or a baby monitor. ? Television closed-captioning. This shows the words at the bottom of the screen. Most new TVs can do this. ? TTY (text telephone). This lets you type messages back and forth on the telephone instead of talking or listening. These devices are also called TDD. When messages are typed on the keyboard, they are sent over the phone line to a receiving TTY. The message is shown on a monitor. · Use pagers, fax machines, text, and email if it is hard for you to communicate by telephone. · Try to learn a listening technique called speech-reading. It is not lip-reading. You pay attention to people's gestures, expressions, posture, and tone of voice. These clues can help you understand what a person is saying. Face the person you are talking to, and have him or her face you. Make sure the lighting is good. You need to see the other person's face clearly. · Think about counseling if you need help to adjust to your hearing loss. When should you call for help? Watch closely for changes in your health, and be sure to contact your doctor if:    · You think your hearing is getting worse. · You have new symptoms, such as dizziness or nausea.            Dizziness: Care Instructions  Your consciousness). · You have dizziness along with symptoms of a heart attack. These may include:  ? Chest pain or pressure, or a strange feeling in the chest.  ? Sweating. ? Shortness of breath. ? Nausea or vomiting. ? Pain, pressure, or a strange feeling in the back, neck, jaw, or upper belly or in one or both shoulders or arms. ? Lightheadedness or sudden weakness. ? A fast or irregular heartbeat. · You have symptoms of a stroke. These may include:  ? Sudden numbness, tingling, weakness, or loss of movement in your face, arm, or leg, especially on only one side of your body. ? Sudden vision changes. ? Sudden trouble speaking. ? Sudden confusion or trouble understanding simple statements. ? Sudden problems with walking or balance. ? A sudden, severe headache that is different from past headaches. Call your doctor now or seek immediate medical care if:    · You feel dizzy and have a fever, headache, or ringing in your ears. · You have new or increased nausea and vomiting. · Your dizziness does not go away or comes back. Watch closely for changes in your health, and be sure to contact your doctor if:    · You do not get better as expected. Where can you learn more? Go to https://[employer].ImmuVen. org and sign in to your [employer] account. Enter S528 in the SezWho box to learn more about \"Dizziness: Care Instructions. \"     If you do not have an account, please click on the \"Sign Up Now\" link. Current as of: September 23, 2018  Content Version: 11.9  © 9434-2528 [employer]. Care instructions adapted under license by [facility] ([facility]). If you have questions about a medical condition or this instruction, always ask your healthcare professional. [place] [address] any warranty or liability for your use of this information.
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
## 2021-01-15 ENCOUNTER — PROCEDURE VISIT (OUTPATIENT)
Dept: AUDIOLOGY | Age: 66
End: 2021-01-15
Payer: COMMERCIAL

## 2021-01-15 ENCOUNTER — TELEPHONE (OUTPATIENT)
Dept: OTOLARYNGOLOGY | Age: 66
End: 2021-01-15

## 2021-01-15 VITALS — TEMPERATURE: 97.1 F

## 2021-01-15 DIAGNOSIS — R42 DIZZINESS: Primary | ICD-10-CM

## 2021-01-15 PROCEDURE — 92537 CALORIC VSTBLR TEST W/REC: CPT | Performed by: AUDIOLOGIST

## 2021-01-15 PROCEDURE — 92540 BASIC VESTIBULAR EVALUATION: CPT | Performed by: AUDIOLOGIST

## 2021-01-15 NOTE — PATIENT INSTRUCTIONS
Dizziness: Care Instructions  Your Care Instructions  Dizziness is the feeling of unsteadiness or fuzziness in your head. It is different than having vertigo, which is a feeling that the room is spinning or that you are moving or falling. It is also different from lightheadedness, which is the feeling that you are about to faint. It can be hard to know what causes dizziness. Some people feel dizzy when they have migraine headaches. Sometimes bouts of flu can make you feel dizzy. Some medical conditions, such as heart problems or high blood pressure, can make you feel dizzy. Many medicines can cause dizziness, including medicines for high blood pressure, pain, or anxiety. If a medicine causes your symptoms, your doctor may recommend that you stop or change the medicine. If it is a problem with your heart, you may need medicine to help your heart work better. If there is no clear reason for your symptoms, your doctor may suggest watching and waiting for a while to see if the dizziness goes away on its own. Follow-up care is a key part of your treatment and safety. Be sure to make and go to all appointments, and call your doctor if you are having problems. It's also a good idea to know your test results and keep a list of the medicines you take. How can you care for yourself at home? · If your doctor recommends or prescribes medicine, take it exactly as directed. Call your doctor if you think you are having a problem with your medicine. · Do not drive while you feel dizzy. · Try to prevent falls. Steps you can take include:  ? Using nonskid mats, adding grab bars near the tub, and using night-lights. ? Clearing your home so that walkways are free of anything you might trip on.  ? Letting family and friends know that you have been feeling dizzy. This will help them know how to help you. When should you call for help? Call 911 anytime you think you may need emergency care.  For example, call if: · You passed out (lost consciousness). · You have dizziness along with symptoms of a heart attack. These may include:  ? Chest pain or pressure, or a strange feeling in the chest.  ? Sweating. ? Shortness of breath. ? Nausea or vomiting. ? Pain, pressure, or a strange feeling in the back, neck, jaw, or upper belly or in one or both shoulders or arms. ? Lightheadedness or sudden weakness. ? A fast or irregular heartbeat. · You have symptoms of a stroke. These may include:  ? Sudden numbness, tingling, weakness, or loss of movement in your face, arm, or leg, especially on only one side of your body. ? Sudden vision changes. ? Sudden trouble speaking. ? Sudden confusion or trouble understanding simple statements. ? Sudden problems with walking or balance. ? A sudden, severe headache that is different from past headaches. Call your doctor now or seek immediate medical care if:    · You feel dizzy and have a fever, headache, or ringing in your ears. · You have new or increased nausea and vomiting. · Your dizziness does not go away or comes back. Watch closely for changes in your health, and be sure to contact your doctor if:    · You do not get better as expected. Where can you learn more? Go to https://IndiaMART.[employer]. org and sign in to your [employer] account. Enter K903 in the [facility] box to learn more about \"Dizziness: Care Instructions. \"     If you do not have an account, please click on the \"Sign Up Now\" link. Current as of: September 23, 2018  Content Version: 11.9  © 8188-4709 [employer]. Care instructions adapted under license by [facility] ([facility]). If you have questions about a medical condition or this instruction, always ask your healthcare professional. [address] any warranty or liability for your use of this information.

## 2021-01-15 NOTE — Clinical Note
Dr. Sukhi Angel,    Please see note from this patient's VNG from today. Please let me know if there is anything further you need.         Shayan Lee  Audiologist

## 2021-01-15 NOTE — TELEPHONE ENCOUNTER
Reviewed VNG-patient appears to have vertigo due to central etiology. MRI 02/6/2020 shows C5-C6 cord compression, multilevel cervical degenerative disc disease, chronic microvascular ischemic white matter disease, cortical atrophy however no noted abnormalities of the temporal bone/IAC (study performed without contrast). He may benefit from vestibular rehabilitation, and we have attempted to call him to schedule this, however reached voicemail without identifiers. Left message instructing him to call the clinic back at [phone].

## 2021-01-15 NOTE — Clinical Note
Dr. Hawa Billy,    Please see note from this patient's VNG testing from today. Please let me know if there is anything further you need.         Shayan Guadalupe  Audiologist

## 2021-01-18 ENCOUNTER — TELEPHONE (OUTPATIENT)
Dept: ENT CLINIC | Age: 66
End: 2021-01-18

## 2021-01-19 ENCOUNTER — TELEPHONE (OUTPATIENT)
Dept: OTOLARYNGOLOGY | Age: 66
End: 2021-01-19

## 2021-01-19 DIAGNOSIS — R26.89 IMBALANCE: Primary | ICD-10-CM

## 2021-01-19 NOTE — TELEPHONE ENCOUNTER
Called patient at home - discussed results of VNG. Suspect central etiology, will plan for vestibular rehabilitation. Prior MRI without pathology. Patient in agreement with plan for physical therapy - symptoms may be related to TBI sustained 02/2019.

## 2021-02-15 ENCOUNTER — TELEPHONE (OUTPATIENT)
Dept: ENT CLINIC | Age: 66
End: 2021-02-15

## 2021-03-04 ENCOUNTER — HOSPITAL ENCOUNTER (OUTPATIENT)
Dept: PHYSICAL THERAPY | Age: 66
Setting detail: THERAPIES SERIES
Discharge: HOME | End: 2021-03-04
Payer: COMMERCIAL

## 2021-03-04 PROCEDURE — 97112 NEUROMUSCULAR REEDUCATION: CPT

## 2021-03-04 PROCEDURE — 97110 THERAPEUTIC EXERCISES: CPT

## 2021-03-04 PROCEDURE — 97162 PT EVAL MOD COMPLEX 30 MIN: CPT
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
## 2021-03-08 ENCOUNTER — HOSPITAL ENCOUNTER (OUTPATIENT)
Dept: PHYSICAL THERAPY | Age: 66
Setting detail: THERAPIES SERIES
Discharge: HOME | End: 2021-03-08
Payer: COMMERCIAL

## 2021-03-08 PROCEDURE — 97112 NEUROMUSCULAR REEDUCATION: CPT

## 2021-03-08 PROCEDURE — 97110 THERAPEUTIC EXERCISES: CPT

## 2021-03-08 NOTE — FLOWSHEET NOTE
Outpatient Physical Therapy     [x] Daily Treatment Note   [] Progress Note   [] Discharge Note      Date:  3/8/2021    Patient Name:  Jeanette Ruiz         YOB: 1955    Medical Diagnosis/ ICD 10:  Vestibular therapy  No diagnosis on C9 or ICD 10, dept to call [facility] to get  Treatment Diagnosis:  Dizziness, imbalance, abnormality of gait     Onset Date:  2/14/19  Referral Date: 2/17/21 [facility]     Referring Physician:  Dr. Adriel Perales     Insurance/Certification Information/Allowed visits:  [facility],  approved for 2x/week x 6 weeks from 2/25/21 to 4/8/21      Restrictions/Precautions:  DM, HTN, hx of upper cervical fractures and cervical fusion with very limited cervical ROM    Latex Allergy:   [x]? Yes   []? 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                                         YES  Hx of cardiovascular problems? NO  Hx of CVA/TIA? NO     Preferred Language for HealthCare:          [x]? English   []? Other:     Plan of care sent to provider:      [x]Faxed  []Co-signature    (attempts: 1[x] 2 []3[])         Plan of care signed:      [x]Yes date: 3/5/21           []No      Progress Note covers period from (if applicable):    [x]NA    []From          To           Next Progress Note due:   By 4/1/21    Visit# / total visits:  2/12    Plan for Next Visit:  · nustep progressing to elliptical  · LE strengthening and flexibility  · Balance and gait activities  · Possible habituation exs, gaze stabilization exs     Subjective:    Pt states no new complaints over weekend  States no issues with HEP       Pain level: 0/10  AT EVAL:    YES  Location: neck                                       1/10 pain at present   3/10 pain at its worst    Objective:       Exercises:    Exercises in bold performed in Assessment/Treatment/Activity Tolerance:    Patients response to treatment:   [x]Patient able to complete treatment  []Patient limited by fatigue   []Patient limited by pain   []Patient limited by other medical complications   []Other:     Goals:   Progress towards goals:  Goals established on 3/4/21  GOALS      Short Term Goals:   3 weeks MET NOT MET Long Term Goals:  6  weeks MET NOT MET   Establish HEP []?  []?  Pt indep with HEP []?  []?      []?  []?  LE strength 5/5 []?  []?    Improve DHI score to 32/100  []?  []?  Improve DHI score to 22/100  []?  []?    Improve Tinetti score to 21/28 []?  []?  Improve Tinetti to 24/28 []?  []?      []?  []?  Gait WFL with no AD for all distances with min to no deviations  []?  []?      []?  []?    []?  []?           Prognosis: [x]Good   []Fair   []Poor    Patient Requires Follow-up:  [x]Yes  []No    Plan: []Plan of care initiated     [x]Continue per plan of care    [] Alter current plan (see comments)    []Hold pending MD visit []Discharge    Timed Code Treatment Minutes:  45    Total Treatment Minutes:  45    Medicare Cap total YTD:        [x]N/A  Workers Comp Time Stamp  (Per CPT and Total Treatment) [x]N/A   Time In: 5078   Time Out: 364 [facility]     Electronically signed by:  Diego Albarran, [address] [facility], [facility], [id number]

## 2021-03-11 ENCOUNTER — HOSPITAL ENCOUNTER (OUTPATIENT)
Dept: PHYSICAL THERAPY | Age: 66
Setting detail: THERAPIES SERIES
Discharge: HOME | End: 2021-03-11
Payer: COMMERCIAL

## 2021-03-11 PROCEDURE — 97110 THERAPEUTIC EXERCISES: CPT

## 2021-03-11 PROCEDURE — 97112 NEUROMUSCULAR REEDUCATION: CPT

## 2021-03-15 ENCOUNTER — HOSPITAL ENCOUNTER (OUTPATIENT)
Dept: PHYSICAL THERAPY | Age: 66
Setting detail: THERAPIES SERIES
Discharge: HOME | End: 2021-03-15
Payer: COMMERCIAL

## 2021-03-15 PROCEDURE — 97112 NEUROMUSCULAR REEDUCATION: CPT

## 2021-03-15 PROCEDURE — 97110 THERAPEUTIC EXERCISES: CPT

## 2021-03-15 PROCEDURE — 97116 GAIT TRAINING THERAPY: CPT

## 2021-03-15 NOTE — FLOWSHEET NOTE
Outpatient Physical Therapy     [x] Daily Treatment Note   [] Progress Note   [] Discharge Note      Date:  3/15/2021    Patient Name:  Jeanette Ruiz         YOB: 1955    Medical Diagnosis/ ICD 10:  Vestibular therapy  No diagnosis on C9 or ICD 10, dept to call [facility] to get  Treatment Diagnosis:  Dizziness, imbalance, abnormality of gait     Onset Date:  2/14/19  Referral Date: 2/17/21 [facility]     Referring Physician:  Dr. Adriel Perales     Insurance/Certification Information/Allowed visits:  [facility],  approved for 2x/week x 6 weeks from 2/25/21 to 4/8/21      Restrictions/Precautions:  DM, HTN, hx of upper cervical fractures and cervical fusion with very limited cervical ROM    Latex Allergy:   [x]? Yes   []? 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                                         YES  Hx of cardiovascular problems? NO  Hx of CVA/TIA? NO     Preferred Language for HealthCare:          [x]? English   []? Other:     Plan of care sent to provider:      [x]Faxed  []Co-signature    (attempts: 1[x] 2 []3[])         Plan of care signed:      [x]Yes date: 3/5/21           []No      Progress Note covers period from (if applicable):    [x]NA    []From          To           Next Progress Note due:   By 4/1/21    Visit# / total visits:  4/12    Plan for Next Visit:  · nustep progressing to elliptical  · LE strengthening and flexibility  · Balance and gait activities  · Possible habituation exs, gaze stabilization exs     Subjective:  · Pt reports that he continues to have balance issues with turning too quickly, standing up too quickly, and notes that he staggers while walking. · He also reports feeling off balance with quiet standing.                 Pain level: 2/10 intermittent pain in neck  AT EVAL:    YES   Location: neck                                       1/10 pain at present   3/10 pain at its worst    Objective:    MONITOR HR PRIOR TO AND WITH ALL ACTIVITY    Exercises:    Exercises in bold performed in department today. Items not bolded are carried forward from prior visits for continuity of the record. Exercise/Equipment Resistance/Repetitions HEP Other comments     Elliptical  Level 1, 4 minutes  HR up to 146  Returns to 120s after few minutes rest []      Nustep  Seat 12,arms 12 level 5 10 minutes  [] Subjective info taken x 5 minutes during ex      Bridges  Pt doing 3x12 [x]      Standing heel raises  Pt doing 3x15 [x]      Standing hip abd B  Pt doing 3x15 [x]      Walking lunge   [] In // bars, no UE support       []        []        []        Neuromuscular Re-education  []        Step ups  6\" 1x10 B  [] No UEs        Step downs  6\" 1x10 B  [] No UEs on descent, can use arm to help back up        Standing of foam  Feet apart, eyes open x 30 sec  Feet apart, eyes closed attempted  Feet together, eyes open x 30 sec   []         Standing balance firm surface Feet apart, eyes closed x 30 sec  Feet together, eyes closed x 30 sec []         Step taps alternating 8\" step, 3 x 30 sec B  [] 24 steps - No UEs         Habituation:  Quick turns  90 degree turns to L and to R, static   Pt reports being off balance vs dizzy [] Pt inst to pick feet up while turning instead of pivoting. Standing  Balance on foam  Feet apart eyes open 2 x 30 sec  []         rebounder ball toss Feet apart, feet together, feet half tandem   15 times each  No UEs         Sidestepping  2 laps up and back with ball toss back and forth  No UEs        Agility training (step over line  and back) 10 reps   No UEs   Gaze stabilization             []      Therapeutic Exercise/Home Exercise Program:   10 minutes [id number]  HEP has been established, See above, pt given handout(s) of new exercises.    elliptical see above, monitored for balance and HR  Discussed history of tachycardia  PT may call PCP to see what acceptable HR level is due to pt climbing into the 140's with light activity today and going no lower than 120 at rest today. Access Code: [email or web address]   URL: [email or web address]   Date: 03/04/2021   Prepared by: Lupe Gaivory     Exercises   · Bridge - 10-15 reps - 3 sets - 1x daily - 7x weekly   · Standing Hip Abduction with Counter Support - 10-15 reps - 3 sets - 1x daily - 7x weekly   · Heel rises with counter support - 10-15 reps - 3 sets - 1x daily - 7x weekly   ·    Therapeutic Activity:  0 minutes     Gait: 8 minutes [id number]  Walking with 90 deg turns - 2 mins  200 ft walk wearing gait belt, CGA-min A x 1 for steadying    Neuromuscular Re-Education:  25 minutes [id number]  See above     Canalith Repositioning Procedure:  0 minutes    Manual Therapy:  0 minutes    Modalities: 0 minutes    Functional Outcome Measure:   []?NA  Measure Used: Dizziness Handicap Inventory   Score: 42/100  Date Assessed: 3/4/21   Tinetti Total Score: 17               Assessment/Treatment/Activity Tolerance:   Pt was hesistant to exercise on elliptical at beginning of session, so warm up was performed on stationary bike instead. Pt had a HR of 105 at beginning of session that increased to 110 with riding bike. Pt continues to demonstrate significant standing balance impairment, both static and dynamic. He had significant difficulty maintaining his balance with gaze stabilization activities on flat ground. Pt also had difficulty with walking longer distances without a cane due to his balance deficits.     Patients response to treatment:   [x]Patient able to complete treatment  []Patient limited by fatigue   []Patient limited by pain   []Patient limited by other medical complications   []Other:     Goals:   Progress towards goals:  Goals established on 3/4/21  GOALS      Short Term Goals:   3 weeks MET NOT MET Long Term Goals:  6 weeks MET NOT MET   Establish HEP []?  []?  Pt indep with HEP []?  []?      []?  []?  LE strength 5/5 []?  []?    Improve DHI score to 32/100  []?  []?  Improve DHI score to 22/100  []?  []?    Improve Tinetti score to 21/28 []?  []?  Improve Tinetti to 24/28 []?  []?      []?  []?  Gait WFL with no AD for all distances with min to no deviations  []?  []?      []?  []?    []?  []?           Prognosis: [x]Good   []Fair   []Poor    Patient Requires Follow-up:  [x]Yes  []No    Plan: []Plan of care initiated     [x]Continue per plan of care    [] Alter current plan (see comments)    []Hold pending MD visit []Discharge    Timed Code Treatment Minutes:  43    Total Treatment Minutes:  43    Medicare Cap total YTD:        [x]N/A  Workers Comp Time Stamp  (Per CPT and Total Treatment) [x]N/A   Time In: 12:48 PM   Time Out: 1:31 PM     Electronically signed by:  Rk Poe [place], [place] [address] #[id number]

## 2021-03-18 ENCOUNTER — HOSPITAL ENCOUNTER (OUTPATIENT)
Dept: PHYSICAL THERAPY | Age: 66
Setting detail: THERAPIES SERIES
Discharge: HOME | End: 2021-03-18
Payer: COMMERCIAL

## 2021-03-18 PROCEDURE — 97112 NEUROMUSCULAR REEDUCATION: CPT

## 2021-03-18 NOTE — FLOWSHEET NOTE
Outpatient Physical Therapy     [x] Daily Treatment Note   [] Progress Note   [] Discharge Note      Date:  3/18/2021    Patient Name:  Gill Crew         YOB: 1955    Medical Diagnosis/ ICD 10:  Vestibular therapy  hemothorax J94.2, occipital condyle fracture S02.113; lac without foreign body S01.81  Treatment Diagnosis:  Dizziness, imbalance, abnormality of gait     Onset Date:  2/14/19  Referral Date: 2/17/21 [facility]     Referring Physician:  Dr. Milan Angelucci     Insurance/Certification Information/Allowed visits:  [facility],  approved for 2x/week x 6 weeks from 2/25/21 to 4/8/21      Restrictions/Precautions:  DM, HTN, hx of upper cervical fractures and cervical fusion with very limited cervical ROM    Latex Allergy:   [x]? Yes   []? 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                                         YES  Hx of cardiovascular problems? NO  Hx of CVA/TIA? NO     Preferred Language for HealthCare:          [x]? English   []? Other:     Plan of care sent to provider:      [x]Faxed  []Co-signature    (attempts: 1[x] 2 []3[])         Plan of care signed:      [x]Yes date: 3/5/21           []No      Progress Note covers period from (if applicable):    [x]NA    []From          To           Next Progress Note due:   By 4/1/21    Visit# / total visits:  5/12    Plan for Next Visit:  · Nustep/scifit/ bike for activity tolerance  · LE strengthening and flexibility  · Balance and gait activities  · Possible habituation exs, gaze stabilization exs     Subjective:  Pt states he didn't like elliptical, made him sore.    Pt states sometimes he feels tightening or swelling in front of throat, feels like it is hard to breathe       Pain level: 2/10 intermittent pain in neck  AT EVAL:    YES reps/min   L to R target, R to L target  31 reps/min  No UEs       Gait with 90 degree turns L and R and 180 deg turns in open gym  3 minutes             []      Therapeutic Exercise/Home Exercise Program:   10 minutes 1:40 PM to 1:50 PM   HEP has been established, See above     Access Code: [email or web address]   URL: [email or web address]   Date: 03/04/2021   Prepared by: Stephane Castellanos     Exercises   · Bridge - 10-15 reps - 3 sets - 1x daily - 7x weekly   · Standing Hip Abduction with Counter Support - 10-15 reps - 3 sets - 1x daily - 7x weekly   · Heel rises with counter support - 10-15 reps - 3 sets - 1x daily - 7x weekly   ·    Therapeutic Activity:  0 minutes     Gait: 0 minutes     Neuromuscular Re-Education:  40 minutes 1:50 PM to 2:30 PM  See above     Canalith Repositioning Procedure:  0 minutes    Manual Therapy:  0 minutes    Modalities: 0 minutes    Functional Outcome Measure:   []?NA  Measure Used: Dizziness Handicap Inventory   Score: 42/100  Date Assessed: 3/4/21   Tinetti Total Score: 17               Assessment/Treatment/Activity Tolerance:  HR improved today vs last time this PT saw this pt 105-122 BPM (this appears to be pt's normal)     Patients response to treatment:   [x]Patient able to complete treatment  []Patient limited by fatigue   []Patient limited by pain   []Patient limited by other medical complications   []Other:     Goals:   Progress towards goals:  Goals established on 3/4/21  GOALS      Short Term Goals:   3 weeks MET NOT MET Long Term Goals:  6  weeks MET NOT MET   Establish HEP []?  []?  Pt indep with HEP []?  []?      []?  []?  LE strength 5/5 []?  []?    Improve DHI score to 32/100  []?  []?  Improve DHI score to 22/100  []?  []?    Improve Tinetti score to 21/28 []?  []?  Improve Tinetti to 24/28 []?  []?      []?  []?  Gait WFL with no AD for all distances with min to no deviations  []?  []?      []?  []?    []?  []?           Prognosis: [x]Good   []Fair   []Poor    Patient Requires Follow-up:  [x]Yes  []No    Plan: []Plan of care initiated     [x]Continue per plan of care    [] Alter current plan (see comments)    []Hold pending MD visit []Discharge    Timed Code Treatment Minutes:  50     Total Treatment Minutes:  50    Medicare Cap total YTD:        [x]N/A  Workers Comp Time Stamp  (Per CPT and Total Treatment) [x]N/A   Time In: 1:40 PM   Time Out: 2:30 PM     Electronically signed by:  Cathy Crystal, OMT-C, [id number]

## 2021-03-22 ENCOUNTER — HOSPITAL ENCOUNTER (OUTPATIENT)
Dept: PHYSICAL THERAPY | Age: 66
Setting detail: THERAPIES SERIES
Discharge: HOME | End: 2021-03-22
Payer: COMMERCIAL

## 2021-03-22 PROCEDURE — 97110 THERAPEUTIC EXERCISES: CPT

## 2021-03-22 PROCEDURE — 97112 NEUROMUSCULAR REEDUCATION: CPT

## 2021-03-22 NOTE — FLOWSHEET NOTE
Outpatient Physical Therapy     [x] Daily Treatment Note   [] Progress Note   [] Discharge Note      Date:  3/22/2021    Patient Name:  Joseph Fitzpatrick         YOB: 1955    Medical Diagnosis/ ICD 10:  Vestibular therapy  hemothorax J94.2, occipital condyle fracture S02.113; lac without foreign body S01.81  Treatment Diagnosis:  Dizziness, imbalance, abnormality of gait     Onset Date:  2/14/19  Referral Date: 2/17/21 [facility]     Referring Physician:  Dr. Jared Callahan     Insurance/Certification Information/Allowed visits:  [facility] [address] approved for 2x/week x 6 weeks from 2/25/21 to 4/8/21      Restrictions/Precautions:  DM, HTN, hx of upper cervical fractures and cervical fusion with very limited cervical ROM    Latex Allergy:   [x]? Yes   []? 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                                         YES  Hx of cardiovascular problems? NO  Hx of CVA/TIA? NO     Preferred Language for HealthCare:          [x]? English   []? Other:     Plan of care sent to provider:      [x]Faxed  []Co-signature    (attempts: 1[x] 2 []3[])         Plan of care signed:      [x]Yes date: 3/5/21           []No      Progress Note covers period from (if applicable):    [x]NA    []From          To           Next Progress Note due:   By 4/1/21    Visit# / total visits:  6/12    Plan for Next Visit:  · Nustep/scifit/ bike for activity tolerance  · LE strengthening and flexibility  · Balance and gait activities  · Possible habituation exs, gaze stabilization exs     Subjective:  No new complaints. Pt reports that he continues to use his cane when walking long distances, like going grocery shopping.  He also reports using his cane when standing for long periods of time because he tends to sway and lose his balance with prolonged standing. Pain level:   AT EVAL:    YES   Location: neck                                       1/10 pain at present   3/10 pain at its worst    Objective:    MONITOR HR PRIOR TO AND WITH ALL ACTIVITY    Exercises:    Exercises in bold performed in department today. Items not bolded are carried forward from prior visits for continuity of the record. Exercise/Equipment Resistance/Repetitions HEP Other comments     Elliptical  Level 1, 4 minutes  HR up to 146  Returns to 120s after few minutes rest []      scifit  Seat 6 showing  Level 3 x 5 min ; x 3 more min          Nustep  Seat 12,arms 12 level 5 10 minutes  [] Subjective info taken x 5 minutes during ex      Bridges  Pt doing 3x12 [x]      Standing heel raises  Pt doing 3x15 [x]      Standing hip abd B  Pt doing 3x15 [x]      Walking lunge  3 laps [] In // bars, no UE support     Squats to chair 2 x 8 [x]        []        []        Neuromuscular Re-education  []        Step ups  6\" 1x10 B  [] No UEs        Step downs  6\" 1x10 B  [] No UEs on descent, can use arm to help back up        Standing on foam  Feet apart, eyes open x 30 sec  Feet together, eyes open x 30 sec  Feet half tandem, eyes open x 30 sec   []         Standing balance firm surface Feet apart, eyes closed x 30 sec  Feet together, eyes closed x 30 sec  Feet half tandem, eyes closed x 30 sec  []               Step taps alternating from foam surface 8\" step, 3 x 10 B  [] No UEs         Habituation:  Quick turns  90 degree turns to L and to R, static   Pt reports being off balance vs dizzy [] Pt inst to pick feet up while turning instead of pivoting.          Standing  Balance on foam  Feet apart eyes open 2 x 30 sec  []         rebounder ball toss Feet apart, feet together, feet half tandem   15 times each  No UEs         Sidestepping  3 laps up and back with ball toss back and forth, 1kg weighted ball  Perform at NV  No UEs        Agility training  step over line  and back, 18 reps/min  Step over line side to side, 18 reps/min  L to L targer, R to R target 35 reps/min   L to R target, R to L target  31 reps/min  No UEs       Gait with 90 degree turns L and R and 180 deg turns in open gym  3 minutes         Semi tandem walking   2 x 15 ft trips []      Therapeutic Exercise/Home Exercise Program:   15 minutes 12:45 PM to 12:55 PM, 1:20 PM to 1:25 PM   HEP has been established, See above     Access Code: [email or web address]   URL: [employer]/   Date: 03/04/2021   Prepared by: Stephane Castellanos     Exercises   · Bridge - 10-15 reps - 3 sets - 1x daily - 7x weekly   · Standing Hip Abduction with Counter Support - 10-15 reps - 3 sets - 1x daily - 7x weekly   · Heel rises with counter support - 10-15 reps - 3 sets - 1x daily - 7x weekly   · Mini Squat with Counter Support - 1 x daily - 7 x weekly - 2-3 sets - 8-10 reps    Therapeutic Activity:  0 minutes     Gait: 0 minutes     Neuromuscular Re-Education:  30 minutes 12:55 PM to 1:20 PM, 1:25 to 1:30 PM  See above     Canalith Repositioning Procedure:  0 minutes    Manual Therapy:  0 minutes    Modalities: 0 minutes    Functional Outcome Measure:   []?NA  Measure Used: Dizziness Handicap Inventory   Score: 42/100  Date Assessed: 3/4/21   Tinetti Total Score: 17               Assessment/Treatment/Activity Tolerance:    Pt had significant difficulty with maintaining standing balance with narrowed base of support. He also continues to use an ankle strategy that leads to him placing most of the weight through his heels, and this results in him losing his balance backward. Pt given updated HEP to address his functional LE weakness.     Patients response to treatment:   [x]Patient able to complete treatment  []Patient limited by fatigue   []Patient limited by pain   []Patient limited by other medical complications   []Other:     Goals:   Progress towards goals:  Goals established on 3/4/21  GOALS      Short Term Goals:   3 weeks MET NOT MET Long Term Goals:  6  weeks MET NOT MET   Establish HEP [x]?  []?  Pt indep with HEP []?  []?      []?  []?  LE strength 5/5 []?  []?    Improve DHI score to 32/100  []?  []?  Improve DHI score to 22/100  []?  []?    Improve Tinetti score to 21/28 []?  []?  Improve Tinetti to 24/28 []?  []?      []?  []?  Gait WFL with no AD for all distances with min to no deviations  []?  []?      []?  []?    []?  []?           Prognosis: [x]Good   []Fair   []Poor    Patient Requires Follow-up:  [x]Yes  []No    Plan: []Plan of care initiated     [x]Continue per plan of care    [] Alter current plan (see comments)    []Hold pending MD visit []Discharge    Timed Code Treatment Minutes:  45    Total Treatment Minutes:  45    Medicare Cap total YTD:        [x]N/A  Workers Comp Time Stamp  (Per CPT and Total Treatment) [x]N/A   Time In: 12:45 PM   Time Out: 1:30 PM     Electronically signed by:  Cathy Johansen DPT, [address] #[id number]

## 2021-03-25 ENCOUNTER — HOSPITAL ENCOUNTER (OUTPATIENT)
Dept: PHYSICAL THERAPY | Age: 66
Setting detail: THERAPIES SERIES
Discharge: HOME | End: 2021-03-25
Payer: COMMERCIAL

## 2021-03-25 PROCEDURE — 97116 GAIT TRAINING THERAPY: CPT

## 2021-03-25 PROCEDURE — 97110 THERAPEUTIC EXERCISES: CPT

## 2021-03-25 PROCEDURE — 97112 NEUROMUSCULAR REEDUCATION: CPT

## 2021-03-25 NOTE — PROGRESS NOTES
Outpatient Physical Therapy     [x] Daily Treatment Note   [x] Progress Note   [] Discharge Note      Date:  3/25/2021    Patient Name:  Chris Brothers         YOB: 1955    Medical Diagnosis/ ICD 10:  Vestibular therapy  hemothorax J94.2, occipital condyle fracture S02.113; lac without foreign body S01.81  Treatment Diagnosis:  Dizziness, imbalance, abnormality of gait     Onset Date:  2/14/19  Referral Date: 2/17/21 [facility]     Referring Physician:  Dr. Aguilar Gave     Insurance/Certification Information/Allowed visits:  [address] [place] [address] approved for 2x/week x 6 weeks from 2/25/21 to 4/8/21      Restrictions/Precautions:  DM, HTN, hx of upper cervical fractures and cervical fusion with very limited cervical ROM    Latex Allergy:   [x]? Yes   []? 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                                         YES  Hx of cardiovascular problems? NO  Hx of CVA/TIA? NO     Preferred Language for HealthCare:          [x]? English   []? Other:     Plan of care sent to provider:      [x]Faxed  []Co-signature    (attempts: 1[x] 2 []3[])         Plan of care signed:      [x]Yes date: 3/5/21           []No      Progress Note covers period from (if applicable):    []NA    [x]From 3/4/21 To 3/25/21           Next Progress Note due:   By 4/22/21 or at last visit      Visit# / total visits:  7/12    Plan for Next Visit:  · Nustep/scifit/ bike for activity tolerance  · LE strengthening and flexibility  · Balance and gait activities    Subjective:   Pt states no dizziness noted within the last week. Pt states he has a hard time describing dizziness vs loss of balance, states no dizziness but still complains of loss of balance. Pt denies any falls.    Pt using his cane for longer distances like the grocery store, but states he will put his cane in the cart and hold onto the cart. States he also uses it when going to [place] because of the steps, states he will hold onto the rail and his cane on the other side, states descending steps is more difficult. States he also uses it when he will be standing for a length of time like in [place]. Pt states he has been driving but hasn't tried driving at night, worries him with the lights coming at him. Pt unable to think of any other issues he is having trouble with at this time. Pain level:   No complaints of pain today  AT EVAL:    YES   Location: neck                                       1/10 pain at present   3/10 pain at its worst    Objective:    MONITOR HR PRIOR TO AND WITH ALL ACTIVITY    Exercises:    Exercises in bold performed in department today. Items not bolded are carried forward from prior visits for continuity of the record.   Exercise/Equipment Resistance/Repetitions HEP Other comments     Elliptical  Level 1, 4 minutes  HR up to 146  Returns to 120s after few minutes rest []      scifit  Seat 8 showing  At rest prior to: 110-115  Level 3 x 5 min  ; x 5 more min   10 minutes total   Subjective info taken during ex     Nustep  Seat 12,arms 12 level 5 10 minutes  [] Subjective info taken x 5 minutes during ex      Bridges  Pt doing 3x12 [x]      Standing heel raises  Pt doing 3x15 [x]      Standing hip abd B  Pt doing 3x15 [x]      Walking lunge  3 laps [] In // bars, no UE support     Squats to chair 2 x 8 [x]        []        []        Neuromuscular Re-education  []        Step ups  6\" 1x10 B  [] No UEs        Step downs  6\" 1x10 B  [] No UEs on descent, can use arm to help back up        Standing on foam  Feet apart, eyes open x 30 sec  Feet together, eyes open x 30 sec  Feet half tandem, eyes open x 30 sec   []         Standing balance firm surface Feet apart, eyes closed x 30 sec  Feet together, eyes closed x 30 sec  Feet years and he can't change it now. Neuromuscular Re-Education:  37 minutes 1313- 1350   See above, pt reassessed today  Performed Tinetti  Balance Score: 15  Gait Score: 10  Tinetti Total Score: 25     Balance Testing:      Romberg:   EO: 30 seconds, Positive sway, mod            EC: 20 seconds, Positive sway, max  Sharpened Romberg: EO: 5 seconds, Positive sway, max             EC: 2 seconds, Positive  Sway. max  Single Leg Stance:   Right EO: 5 seconds     Left EO: 5 Seconds  Modified CTSIB:    Condition 1: 30 seconds      Condition 2: 20 seconds      Condition 3: 24 seconds     Condition 4: 5 seconds     Total score: 79/120 sec       Canalith Repositioning Procedure:  0 minutes    Manual Therapy:  0 minutes    Modalities: 0 minutes    Functional Outcome Measure:   []?NA  Measure Used: Dizziness Handicap Inventory   Score: 42/100  Date Assessed: 3/4/21   Tinetti Total Score: 17      Measure Used: Dizziness Handicap Inventory   Score: 6/100  Date Assessed: 3/25/21   Tinetti Total Score: 25/28             Assessment/Treatment/Activity Tolerance:  Pt making progress with PT. Pt no longer complaining of dizziness in the past week, DHI score improved from 42/100 to 6/100. Pt states he continues to have LOB. Tinetti has improved from 17/28 to 25/28. Pt appears to have most difficulty with static balance activities but demonstrates great ankle strategy and stepping strategies when testing these. Pt's gait improving, requiring no AD for shorter community distances and pt has reported no falls to PT. Pt will benefit from continuing PT to address remaining goals and maximize function. Patients response to treatment:   [x]Patient able to complete treatment  []Patient limited by fatigue   []Patient limited by pain   []Patient limited by other medical complications   []Other:     Goals:   Progress towards goals:  Goals established on 3/4/21.  Goals met as marked below per progress note on 3/25/21  GOALS      Short Term

## 2021-03-29 ENCOUNTER — HOSPITAL ENCOUNTER (OUTPATIENT)
Dept: PHYSICAL THERAPY | Age: 66
Setting detail: THERAPIES SERIES
Discharge: HOME | End: 2021-03-29
Payer: COMMERCIAL

## 2021-03-29 PROCEDURE — 97112 NEUROMUSCULAR REEDUCATION: CPT

## 2021-03-29 PROCEDURE — 97110 THERAPEUTIC EXERCISES: CPT

## 2021-03-29 NOTE — FLOWSHEET NOTE
Outpatient Physical Therapy     [x] Daily Treatment Note   [] Progress Note   [] Discharge Note      Date:  3/29/2021    Patient Name:  Jameson Schafer         YOB: 1955    Medical Diagnosis/ ICD 10:  Vestibular therapy  hemothorax J94.2, occipital condyle fracture S02.113; lac without foreign body S01.81  Treatment Diagnosis:  Dizziness, imbalance, abnormality of gait     Onset Date:  2/14/19  [facility]     Referring Physician:  Dr. Mely Ambrocio     Insurance/Certification Information/Allowed visits:  [facility],  approved for 2x/week x 6 weeks from 2/25/21 to 4/8/21      Restrictions/Precautions:  DM, HTN, hx of upper cervical fractures and cervical fusion with very limited cervical ROM    Latex Allergy:   [x]? Yes   []? 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                                         YES  Hx of cardiovascular problems? NO  Hx of CVA/TIA? NO     Preferred Language for HealthCare:          [x]? English   []? Other:     Plan of care sent to provider:      [x]Faxed  []Co-signature    (attempts: 1[x] 2 []3[])         Plan of care signed:      [x]Yes date: 3/5/21           []No      Progress Note covers period from (if applicable):    []NA    [x]From 3/4/21 To 3/25/21           Next Progress Note due:   By 4/22/21 or at last visit      Visit# / total visits:  8/12    Plan for Next Visit:  · Nustep/scifit/ bike for activity tolerance  · LE strengthening and flexibility  · Balance and gait activities    Subjective:   · Pt reports that he went shopping for a car over the weekend, used his cane while walking. He reports that he felt he needed the cane because he was \"wobbly. 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 Says that he will not be returning to driving because of his difficulties with neck movement secondary to his cervical fractures. Pain level:   No complaints of pain today  AT EVAL:    YES   Location: neck                                       1/10 pain at present   3/10 pain at its worst    Objective:    MONITOR HR PRIOR TO AND WITH ALL ACTIVITY    Exercises:    Exercises in bold performed in department today. Items not bolded are carried forward from prior visits for continuity of the record.   Exercise/Equipment Resistance/Repetitions HEP Other comments     Elliptical  Level 1, 4 minutes  HR up to 146  Returns to 120s after few minutes rest []      scifit  Seat 8 showing  At rest prior to  Level 3 x 5 min HR 89 ; x 5 more min   5 minutes total   Subjective info taken during ex     Nustep  Seat 12,arms 12 level 5 10 minutes  [] Subjective info taken x 5 minutes during ex      Bridges  Pt doing 3x12 [x]      Standing heel raises  Pt doing 3x15 [x]      Standing hip abd B  Pt doing 3x15 [x]      Walking lunge  3 laps [] In // bars, no UE support     Squats to chair 2 x 8 [x]        []        []        Neuromuscular Re-education  []        Step ups  6\" 1x10 B  [] No UEs        Step downs  6\" 1x10 B  [] No UEs on descent, can use arm to help back up        Standing on foam  Feet apart, EC, nodding head up and down x 1 min  Feet apart, EC, lateral head turns x 1 min  Feet apart, eyes closed x 30 sec  Feet apart, eyes open x 30 sec  Feet together, eyes open x 30 sec  Feet half tandem, eyes open x 30 sec   []         Standing balance firm surface Feet apart, eyes closed x 30 sec  Feet together, eyes closed x 30 sec  Feet half tandem, eyes closed x 30 sec  []    Side step overs 8\" , 2 x 1 min          Step taps alternating from foam surface 8\" step, 1 min  8\" step, 3 x 10 B  [] No UEs         Habituation:  Quick turns  90 degree turns to L and to R, static   Pt reports being off balance vs dizzy [] Pt inst to pick feet up while turning instead of pivoting. Standing  Balance on foam  Feet apart eyes open 2 x 30 sec  []         rebounder ball toss Feet apart, feet together, feet half tandem   15 times each  No UEs         Sidestepping  3 laps up and back with ball toss back and forth, 1kg weighted ball  Perform at [place]  No UEs        Agility training  step over line  and back, 18 reps/min  Step over line side to side, 18 reps/min  L to L targer, R to R target 35 reps/min   L to R target, R to L target  31 reps/min  No UEs       Gait with 90 degree turns L and R and 180 deg turns in open gym  3 minutes         Semi tandem walking   x4 trips in // bars  2 x 15 ft trips []      Therapeutic Exercise/Home Exercise Program:   13 minutes  6450-0421, [id number]   HEP has been established, See above  Reassessed for progress note:   LE strength: All 5/5     Access Code: [email or web address]   URL: [email or web address]   Date: 03/04/2021   Prepared by: Diego Albarran     Exercises   · Bridge - 10-15 reps - 3 sets - 1x daily - 7x weekly   · Standing Hip Abduction with Counter Support - 10-15 reps - 3 sets - 1x daily - 7x weekly   · Heel rises with counter support - 10-15 reps - 3 sets - 1x daily - 7x weekly   · Mini Squat with Counter Support - 1 x daily - 7 x weekly - 2-3 sets - 8-10 reps  · Rebounder ball toss    Therapeutic Activity:  0 minutes     Gait: 0 minutes    Performed Tinettit- see score below  Pt has been arriving to PT without AD, independent gait  Gait deviations include slow vinita, occasional stagger or deviated path  Steps pt able to do 8 steps with single rail with reciprocal pattern, pt turns his feet sideways slightly when both ascending and descending steps, states he has been doing this for 50 years and he can't change it now.      Neuromuscular Re-Education:  35 minutes 1250 - 1325   See above, pt reassessed today  Performed Tinetti              Balance Testing: Romberg:   EO: 30 seconds, Positive sway, mod            EC: 20 seconds, Positive sway, max  Sharpened Romberg: EO: 5 seconds, Positive sway, max             EC: 2 seconds, Positive  Sway. max  Single Leg Stance:   Right EO: 5 seconds     Left EO: 5 Seconds  Modified CTSIB:    Condition 1: 30 seconds      Condition 2: 20 seconds      Condition 3: 24 seconds     Condition 4: 5 seconds     Total score: 79/120 sec       Canalith Repositioning Procedure:  0 minutes    Manual Therapy:  0 minutes    Modalities: 0 minutes    Functional Outcome Measure:   []?NA  Measure Used: Dizziness Handicap Inventory   Score: 42/100  Date Assessed: 3/4/21   Tinetti Total Score: 17      Measure Used: Dizziness Handicap Inventory   Score: 6/100  Date Assessed: 3/25/21   Tinetti Total Score: 25/28             Assessment/Treatment/Activity Tolerance:  Pt continues to have tendency to lose balance in backwards direction with static and dynamic standing activities. Therapist explained possible causes for pt's balance strategy at length with him at today's visit. Will continue to train pt in standing stability exercises to address his balance strategy. Patients response to treatment:   [x]Patient able to complete treatment  []Patient limited by fatigue   []Patient limited by pain   []Patient limited by other medical complications   []Other:     Goals:   Progress towards goals:  Goals established on 3/4/21.  Goals met as marked below per progress note on 3/25/21  GOALS      Short Term Goals:   3 weeks MET NOT MET Long Term Goals:  6  weeks MET NOT MET   Establish HEP [x]?  []?  Pt indep with HEP []?  []?      []?  []?  LE strength 5/5 [x]?  []?    Improve DHI score to 32/100  [x]?  []?  Improve DHI score to 22/100  [x]?  []?    Improve Tinetti score to 21/28 [x]?  []?  Improve Tinetti to 24/28 [x]?  []?      []?  []?  Gait WFL with no AD for all distances with min to no deviations  []?  []?      []?  []?    []?  []?         Prognosis: [x]Good   []Fair   []Poor    Patient Requires Follow-up:  [x]Yes  []No    Plan: []Plan of care initiated     [x]Continue per plan of care    [] Alter current plan (see comments)    []Hold pending MD visit []Discharge    Timed Code Treatment Minutes:  48    Total Treatment Minutes:  48    Medicare Cap total YTD:        [x]N/A  Workers Comp Time Stamp  (Per CPT and Total Treatment) [x]N/A   Time In: 8256   Time Out:  0105     Electronically signed by:  Clare Morgan DPT, [address] #[id number]

## 2021-04-01 ENCOUNTER — HOSPITAL ENCOUNTER (OUTPATIENT)
Dept: PHYSICAL THERAPY | Age: 66
Setting detail: THERAPIES SERIES
Discharge: HOME | End: 2021-04-01
Payer: COMMERCIAL

## 2021-04-01 PROCEDURE — 97112 NEUROMUSCULAR REEDUCATION: CPT

## 2021-04-01 NOTE — FLOWSHEET NOTE
Outpatient Physical Therapy     [x] Daily Treatment Note   [] Progress Note   [] Discharge Note      Date:  4/1/2021    Patient Name:  Stacy Schafer         YOB: 1955    Medical Diagnosis/ ICD 10:  Vestibular therapy  hemothorax J94.2, occipital condyle fracture S02.113; lac without foreign body S01.81  Treatment Diagnosis:  Dizziness, imbalance, abnormality of gait     Onset Date:  2/14/19  Referral Date: 2/17/21 [facility]     Referring Physician:  Dr. Dior Arnold     Insurance/Certification Information/Allowed visits:  [address] [place] [address] approved for 2x/week x 6 weeks from 2/25/21 to 4/8/21      Restrictions/Precautions:  DM, HTN, hx of upper cervical fractures and cervical fusion with very limited cervical ROM    Latex Allergy:   [x]? Yes   []? 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                                         YES  Hx of cardiovascular problems? NO  Hx of CVA/TIA? NO     Preferred Language for HealthCare:          [x]? English   []? Other:     Plan of care sent to provider:      [x]Faxed  []Co-signature    (attempts: 1[x] 2 []3[])         Plan of care signed:      [x]Yes date: 3/5/21           []No      Progress Note covers period from (if applicable):    [x]NA    []From 3/25/21           Next Progress Note due:   By 4/22/21 or at last visit      Visit# / total visits:  9/12    Plan for Next Visit:  · Nustep/scifit/ bike for activity tolerance  · Progress HEP for balance activities so pt independent in 2 visits. 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 Pt states it has been going well, low back has been bothering him a bit with prolonged sitting.    Pt states he switched to decaf coffee but still drinking 2 pots per day. Pain level:   No complaints of pain today  AT EVAL:    YES   Location: neck                                       1/10 pain at present   3/10 pain at its worst    Objective:    MONITOR HR PRIOR TO AND WITH ALL ACTIVITY  (PT notes HR has been lower since pt has switched to decaf)    Exercises:    Exercises in bold performed in department today. Items not bolded are carried forward from prior visits for continuity of the record.   Exercise/Equipment Resistance/Repetitions HEP Other comments     Elliptical  Level 1, 4 minutes  HR up to 146  Returns to 120s after few minutes rest []      scifit  Seat 8 showing  At rest prior to  Level 3.5  x 10 min  after    Subjective info taken during ex     Nustep  Seat 12,arms 12 level 5 10 minutes  [] Subjective info taken x 5 minutes during ex      Bridges  Pt doing 3x12 [x]      Standing heel raises  Pt doing 3x15 [x]      Standing hip abd B  Pt doing 3x15 [x]      Walking lunge  3 laps [] In // bars, no UE support     Squats to chair 2 x 8 [x]        []        []        Neuromuscular Re-education  []        Step ups  6\" 1x10 B  [] No UEs        Step downs  6\" 1x10 B  [] No UEs on descent, can use arm to help back up        Standing on foam  Feet apart, EC, nodding head up and down x 1 min  Feet apart, EC, lateral head turns x 1 min  Feet apart, eyes closed x 30 sec  Feet apart, eyes open x 30 sec  Feet together, eyes open x 30 sec  Feet half tandem, eyes open x 30 sec     Feet apart, at large checkerboard, large letter search, random, with cognitive task    Feet apart, at large checkerboard, small letter search, random, with cognitive task   []                   CGA most of the time, MIN A intermittently to correct LOB        Standing balance firm surface Feet apart, conditions 1-6  Feet together, conditions 1-6  Feet half tandem, conditions 1-6 [x] Reviewed and performed all of these today in the corner so that pt can do at home, pt inst to do in a above  Discussed prognosis, pt frustration at lack of diagnosis for balance issues and if it will get better, and continuation of HEP after therapy       Balance Testing:        Canalith Repositioning Procedure:  0 minutes    Manual Therapy:  0 minutes    Modalities: 0 minutes    Functional Outcome Measure:   []?NA  Measure Used: Dizziness Handicap Inventory   Score: 42/100  Date Assessed: 3/4/21   Tinetti Total Score: 17      Measure Used: Dizziness Handicap Inventory   Score: 6/100  Date Assessed: 3/25/21   Tinetti Total Score: 25/28             Assessment/Treatment/Activity Tolerance:    Patients response to treatment:   [x]Patient able to complete treatment  []Patient limited by fatigue   []Patient limited by pain   []Patient limited by other medical complications   []Other:     Goals:   Progress towards goals:  Goals established on 3/4/21.  Goals met as marked below per progress note on 3/25/21  GOALS      Short Term Goals:   3 weeks MET NOT MET Long Term Goals:  6  weeks MET NOT MET   Establish HEP [x]?  []?  Pt indep with HEP []?  []?      []?  []?  LE strength 5/5 [x]?  []?    Improve DHI score to 32/100  [x]?  []?  Improve DHI score to 22/100  [x]?  []?    Improve Tinetti score to 21/28 [x]?  []?  Improve Tinetti to 24/28 [x]?  []?      []?  []?  Gait WFL with no AD for all distances with min to no deviations  []?  []?      []?  []?    []?  []?           Prognosis: [x]Good   []Fair   []Poor    Patient Requires Follow-up:  [x]Yes  []No    Plan: []Plan of care initiated     [x]Continue per plan of care    [] Alter current plan (see comments)    []Hold pending MD visit []Discharge    Timed Code Treatment Minutes:  55    Total Treatment Minutes:  55    Medicare Cap total YTD:        [x]N/A  Workers Comp Time Stamp  (Per CPT and Total Treatment) [x]N/A   Time In: 2056   Time Out:  [address] [facility]     Electronically signed by:  Teofilo Stewart PT, OMT-C, [id number]

## 2021-04-05 ENCOUNTER — HOSPITAL ENCOUNTER (OUTPATIENT)
Dept: PHYSICAL THERAPY | Age: 66
Setting detail: THERAPIES SERIES
Discharge: HOME | End: 2021-04-05
Payer: COMMERCIAL

## 2021-04-05 PROCEDURE — 97112 NEUROMUSCULAR REEDUCATION: CPT

## 2021-04-05 NOTE — FLOWSHEET NOTE
Outpatient Physical Therapy     [x] Daily Treatment Note   [] Progress Note   [] Discharge Note      Date:  4/5/2021    Patient Name:  Corky Gatica         YOB: 1955    Medical Diagnosis/ ICD 10:  Vestibular therapy  hemothorax J94.2, occipital condyle fracture S02.113; lac without foreign body S01.81  Treatment Diagnosis:  Dizziness, imbalance, abnormality of gait     Onset Date:  2/14/19  Referral Date: 2/17/21 [facility]     Referring Physician:  Dr. Daniella Mendez     Insurance/Certification Information/Allowed visits:  [facility],  approved for 2x/week x 6 weeks from 2/25/21 to 4/8/21      Restrictions/Precautions:  DM, HTN, hx of upper cervical fractures and cervical fusion with very limited cervical ROM    Latex Allergy:   [x]? Yes   []? 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                                         YES  Hx of cardiovascular problems? NO  Hx of CVA/TIA? NO     Preferred Language for HealthCare:          [x]? English   []? Other:     Plan of care sent to provider:      [x]Faxed  []Co-signature    (attempts: 1[x] 2 []3[])         Plan of care signed:      [x]Yes date: 3/5/21           []No      Progress Note covers period from (if applicable):    [x]NA    []From 3/25/21           Next Progress Note due:   By 4/22/21 or at last visit      Visit# / total visits:  10/12    Plan for Next Visit:  · Nustep/scifit/ bike for activity tolerance  · Progress HEP for balance activities so pt independent in 2 visits. Subjective:   No new complaints  Pt states he was walking in his grass over the weekend, states he did not fall. Pt has not reported any falls since starting PT and no falls in the 6 months prior to PT.        Pain level:   No complaints of pain today  AT EVAL:    YES Location: neck                                       1/10 pain at present   3/10 pain at its worst    Objective:    MONITOR HR PRIOR TO AND WITH ALL ACTIVITY  (PT notes HR has been lower since pt has switched to decaf)    Exercises:    Exercises in bold performed in department today. Items not bolded are carried forward from prior visits for continuity of the record. Exercise/Equipment Resistance/Repetitions HEP Other comments     Elliptical  Level 1, 4 minutes  HR up to 146  Returns to 120s after few minutes rest []      scifit  Seat 8 showing  At rest prior to   Level 3.5  x 10 min  after    Subjective info taken during ex     Nustep  Seat 12,arms 12 level 5 10 minutes  [] Subjective info taken x 5 minutes during ex      Bridges  Pt doing 3x12 [x]      Standing heel raises  Pt doing 3x15 [x]      Standing hip abd B  Pt doing 3x15 [x]      Walking lunge  3 laps [] In // bars, no UE support     Squats to chair 2 x 8 [x]        []        []        Neuromuscular Re-education  []        Step ups  6\" 1x10 B  [] No UEs        Step downs  6\" 1x10 B  [] No UEs on descent, can use arm to help back up        Standing on foam  Feet apart, EC, nodding head up and down x 1 min  Feet apart, EC, lateral head turns x 1 min  Feet apart, eyes closed x 30 sec  Feet apart, eyes open x 30 sec  Feet together, eyes open x 30 sec  Feet half tandem, eyes open x 30 sec     Feet apart, at large checkerboard, large letter search, random, with cognitive task    Feet apart, at large checkerboard, small letter search, random, with cognitive task   []                   CGA most of the time, MIN A intermittently to correct LOB        Standing balance firm surface Feet apart, conditions 1-6  Feet together, conditions 1-6  Feet half tandem, conditions 1-6 [x] Reviewed and performed all of these today in the corner so that pt can do at home, pt inst to do in a corner with a chair in front or wife in front for supervision.   Pt inst to start with firm surface and can progress to foam (couch cushion) as able. Pt given safety precautions. Pt given handout   Side step overs 8\" , 2 x 1 min          Step taps alternating from foam surface 8\" step, 1 min  8\" step, 3 x 10 B  [] No UEs         Habituation:  Quick turns  90 degree turns to L and to R, static   Pt reports being off balance vs dizzy [] Pt inst to pick feet up while turning instead of pivoting. Standing  Balance on foam  Feet apart eyes open 2 x 30 sec  []         rebounder ball toss Feet apart, feet together, feet half tandem   15 times each  No UEs         Sidestepping  3 laps up and back with ball toss back and forth, 1kg weighted ball  Perform at NV  No UEs        Agility training  step over line  and back, 18 reps/min  Step over line side to side, 18 reps/min  L to L targer, R to R target 35 reps/min   L to R target, R to L target  31 reps/min  No UEs       Gait with 90 degree turns L and R and 180 deg turns in open gym  3 minutes         Semi tandem walking   x4 trips in // bars  2 x 15 ft trips []      Balance exercises for home  See 10 exercises under [address] below, practiced in dept and pt given instructions and safety precautions for home   [x]       balance and gait activities outside Walking outside, upd/down ramp, in grass, up/down curb step, over obstacles (parking block), circles around columns, tandem walking along painted line     Encouraged pt not to grab for walls, objects            Therapeutic Exercise/Home Exercise Program:   0 minutes    HEP has been established, See above    Access Code: [email or web address]   URL: [email or web address]   Date: 03/04/2021   Prepared by: Yi Payne     Exercises   · Bridge - 10-15 reps - 3 sets - 1x daily - 7x weekly   · Standing Hip Abduction with Counter Support - 10-15 reps - 3 sets - 1x daily - 7x weekly   · Heel rises with counter support - 10-15 reps - 3 sets - 1x daily - 7x weekly   · Mini Squat with Counter Support - 1 x daily - 7 x weekly - 2-3 sets - 8-10 reps  · Rebounder ball toss           Therapeutic Activity:  0 minutes     Gait: 0 minutes    Pt has been arriving to PT without AD, independent gait  Gait deviations include slow vinita, occasional stagger or deviated path      Neuromuscular Re-Education:  60 minutes  [id number]  See above and Kae below:     Access Code: [email or web address]  URL: [email or web address]: 04/05/2021Prepared by: Maureen Sandifer RowlandExerckwaku   Single Leg Stance with Arms Crossed - 1 x daily - 7 x weekly - 10 reps - 5-30 seconds hold   Tandem Stance with Arms Out to Sides - 1 x daily - 7 x weekly - 10 reps - 3 sets   Tandem Stance with Arms Crossed - 1 x daily - 7 x weekly - 10 reps - 5-30 seconds hold   Walk Turns - 1 x daily - 7 x weekly   Alternating Single Leg Balance - Foot in Front - 1 x daily - 7 x weekly - 10 reps - 2 hold   Single Leg Balance Raising Opposite Arm and Leg - 1 x daily - 7 x weekly - 10 reps - 2 hold   Single Leg Balance with Alternate Arm Raise - 1 x daily - 7 x weekly - 2 reps - 10 sets   Tandem Walking - 1 x daily - 7 x weekly   Single Leg Balance with Opposite Leg Star Reach - 1 x daily - 7 x weekly - 10-20 reps   Standing Balance with Diagonal Ball Lift - 1 x daily - 7 x weekly - 10 reps      Balance Testing:        Canalith Repositioning Procedure:  0 minutes    Manual Therapy:  0 minutes    Modalities: 0 minutes    Functional Outcome Measure:   []?NA  Measure Used: Dizziness Handicap Inventory   Score: 42/100  Date Assessed: 3/4/21   Tinetti Total Score: 17      Measure Used: Dizziness Handicap Inventory   Score: 6/100  Date Assessed: 3/25/21   Tinetti Total Score: 25/28             Assessment/Treatment/Activity Tolerance:  Pt did well with today's activities. PT notes frequent LOB but pt always able to self correct, good ankle strategy and stepping strategies.    Patients response to treatment:   [x]Patient able to complete treatment  []Patient limited by fatigue   []Patient limited by pain   []Patient limited by other medical complications   []Other:     Goals:   Progress towards goals:  Goals established on 3/4/21.  Goals met as marked below per progress note on 3/25/21  GOALS      Short Term Goals:   3 weeks MET NOT MET Long Term Goals:  6  weeks MET NOT MET   Establish HEP [x]?  []?  Pt indep with HEP []?  []?      []?  []?  LE strength 5/5 [x]?  []?    Improve DHI score to 32/100  [x]?  []?  Improve DHI score to 22/100  [x]?  []?    Improve Tinetti score to 21/28 [x]?  []?  Improve Tinetti to 24/28 [x]?  []?      []?  []?  Gait WFL with no AD for all distances with min to no deviations  []?  []?      []?  []?    []?  []?           Prognosis: [x]Good   []Fair   []Poor    Patient Requires Follow-up:  [x]Yes  []No    Plan: []Plan of care initiated     [x]Continue per plan of care    [] Alter current plan (see comments)    []Hold pending MD visit []Discharge    Timed Code Treatment Minutes:  60    Total Treatment Minutes:  60    Medicare Cap total YTD:        [x]N/A  Workers Comp Time Stamp  (Per CPT and Total Treatment) [x]N/A   Time In: 4236   Time Out:  [address] [facility]     Electronically signed by:  Geo Stephen PT, OMT-C, [id number]

## 2021-04-08 ENCOUNTER — HOSPITAL ENCOUNTER (OUTPATIENT)
Dept: PHYSICAL THERAPY | Age: 66
Setting detail: THERAPIES SERIES
Discharge: HOME | End: 2021-04-08
Payer: COMMERCIAL

## 2021-04-08 PROCEDURE — 97110 THERAPEUTIC EXERCISES: CPT

## 2021-04-08 PROCEDURE — 97112 NEUROMUSCULAR REEDUCATION: CPT

## 2021-04-08 NOTE — PROGRESS NOTES
Outpatient Physical Therapy     [x] Daily Treatment Note   [] Progress Note   [x] Discharge Note      Date:  4/8/2021    Patient Name:  Bennett Murray         YOB: 1955    Medical Diagnosis/ ICD 10:  Vestibular therapy  hemothorax J94.2, occipital condyle fracture S02.113; lac without foreign body S01.81  Treatment Diagnosis:  Dizziness, imbalance, abnormality of gait     Onset Date:  2/14/19  Referral Date: 2/17/21 [facility]     Referring Physician:  Dr. Anthony Farmer     Insurance/Certification Information/Allowed visits:  [facility],  approved for 2x/week x 6 weeks from 2/25/21 to 4/8/21      Restrictions/Precautions:  DM, HTN, hx of upper cervical fractures and cervical fusion with very limited cervical ROM    Latex Allergy:   [x]? Yes   []? 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                                         YES  Hx of cardiovascular problems? NO  Hx of CVA/TIA? NO     Preferred Language for HealthCare:          [x]? English   []? Other:     Plan of care sent to provider:      [x]Faxed  []Co-signature    (attempts: 1[x] 2 []3[])         Plan of care signed:      [x]Yes date: 3/5/21           []No      Progress Note covers period from (if applicable):    []NA    [x]From 3/25/21 to 4/8/21           Next Progress Note due:   NA    Visit# / total visits:  11/12    Plan for Next Visit:  · NA      Subjective:   Pt continues to deny dizziness, complains of being off balance but not dizzy. Pt has not reported any falls since starting PT and no falls in the 6 months prior to PT. Pt states he has had some back pain since returning to work and maybe sitting for long periods of time.    Pt states he uses his cane at work to do the stairs and for long distances (10 minute walk or greater)   Pt states no issues with HEP, states new balance exercises are challenging. Pain level:   LBP today, 3-4/10. AT EVAL:    YES   Location: neck                                       1/10 pain at present   3/10 pain at its worst    Objective:        Exercises:    Exercises in bold performed in department today. Items not bolded are carried forward from prior visits for continuity of the record. Exercise/Equipment Resistance/Repetitions HEP Other comments     Elliptical  Level 1, 4 minutes  HR up to 146  Returns to 120s after few minutes rest []      scifit  Seat 8 showing  Level 3.5  x 10 min    Subjective info taken during ex     Nustep  Seat 12,arms 12 level 5 10 minutes  [] Subjective info taken x 5 minutes during ex      Bridges  Pt doing 3x12 [x]      Standing heel raises  Pt doing 3x15 [x]      Standing hip abd B  Pt doing 3x15 [x]      Walking lunge  3 laps [] In // bars, no UE support     Squats to chair 2 x 8 [x]        []        []        Neuromuscular Re-education  []        Step ups  6\" 1x10 B  [] No UEs        Step downs  6\" 1x10 B  [] No UEs on descent, can use arm to help back up        Standing on foam  Feet apart, EC, nodding head up and down x 1 min  Feet apart, EC, lateral head turns x 1 min  Feet apart, eyes closed x 30 sec  Feet apart, eyes open x 30 sec  Feet together, eyes open x 30 sec  Feet half tandem, eyes open x 30 sec     Feet apart, at large checkerboard, large letter search, random, with cognitive task    Feet apart, at large checkerboard, small letter search, random, with cognitive task   []                   CGA most of the time, MIN A intermittently to correct LOB        Standing balance firm surface Feet apart, conditions 1-6  Feet together, conditions 1-6  Feet half tandem, conditions 1-6 [x] Reviewed and performed all of these today in the corner so that pt can do at home, pt inst to do in a corner with a chair in front or wife in front for supervision.   Pt inst to start reps - 3 sets - 1x daily - 7x weekly   · Mini Squat with Counter Support - 1 x daily - 7 x weekly - 2-3 sets - 8-10 reps  · Rebounder ball toss       Therapeutic Activity:  0 minutes     Gait: 0 minutes    Pt has been arriving to PT without AD, independent gait  Gait deviations include slow vinita, occasional stagger or deviated path  Steps: pt able to do 4 steps x 3 in dept with reciprocal pattern with 1 rail independently       Neuromuscular Re-Education:  50 minutes  [id number]  Tests below originally assessed on 3/25/21 and reassessed today (see bold)  Sharpened Romberg:           EO: 5 seconds, Positive sway, max    Today same                                       EC: 2 seconds, Positive  Sway. max    Today same  Single Leg Stance:    Right EO: 5 seconds   Today 7 seconds  Left EO: 5 Seconds       Today 5 seconds   Modified CTSIB:        Condition 1: 30 seconds  Today 25 seconds       Condition 2: 20 seconds   Today 5 seconds    Condition 3: 24 seconds    Today 25 seconds  Condition 4: 5 seconds      Today 5 seconds   Total score: 79/120 sec     Total score: 60/120 seconds        See above and [employer] below:  Pt independent with HEP     Access Code: [email or web address]  URL: [employer]/Date: 04/05/2021Prepared by: Fiona Rockwell   Single Leg Stance with Arms Crossed - 1 x daily - 7 x weekly - 10 reps - 5-30 seconds hold   Tandem Stance with Arms Out to Sides - 1 x daily - 7 x weekly - 10 reps - 3 sets   Tandem Stance with Arms Crossed - 1 x daily - 7 x weekly - 10 reps - 5-30 seconds hold   Walk Turns - 1 x daily - 7 x weekly   Alternating Single Leg Balance - Foot in Front - 1 x daily - 7 x weekly - 10 reps - 2 hold   Single Leg Balance Raising Opposite Arm and Leg - 1 x daily - 7 x weekly - 10 reps - 2 hold   Single Leg Balance with Alternate Arm Raise - 1 x daily - 7 x weekly - 2 reps - 10 sets   Tandem Walking - 1 x daily - 7 x weekly   Single Leg Balance with Opposite Leg Star Reach - 1 x [x]?  []?  Improve DHI score to 22/100  [x]?  []?    Improve Tinetti score to 21/28 [x]?  []?  Improve Tinetti to 24/28 [x]?  []?      []?  []?  Gait WFL with no AD for all distances with min to no deviations PARTIALLY MET []?  []?      []?  []?    []?  []?           Prognosis: [x]Good   []Fair   []Poor    Patient Requires Follow-up:  []Yes  [x]No    Plan: []Plan of care initiated     []Continue per plan of care    [] Alter current plan (see comments)    []Hold pending MD visit [x]Discharge to [facility]    Timed Code Treatment Minutes:  60    Total Treatment Minutes:  60    Medicare Cap total YTD:        [x]N/A  Workers Comp Time Stamp  (Per CPT and Total Treatment) [x]N/A   Time In: 2008   Time Out:  0528     Electronically signed by:  James Randall PT, OMT-C, [id number]

## 2021-08-03 ENCOUNTER — NURSE TRIAGE (OUTPATIENT)
Dept: OTHER | Facility: CLINIC | Age: 66
End: 2021-08-03

## 2021-08-03 NOTE — TELEPHONE ENCOUNTER
Received call from Juan at [facility] with Red Flag Complaint. Brief description of triage: low BP 87/57 then got reading of 92/60 at 12:50 called off work takes medication for high BP and has been taking as directed and not checking his BP prior to medications, now it is 102/64 and      Triage indicates for patient to go to ED or to office with approval spoke with loretta  in office at [facility] [address] whom is going to call the patient back after she speaks with Christina Kahn in regards to the patients disposition. Care advice provided, patient verbalizes understanding; denies any other questions or concerns; instructed to call back for any new or worsening symptoms. Attention Provider: Thank you for allowing me to participate in the care of your patient. The patient was connected to triage in response to information provided to the ECC. Please do not respond through this encounter as the response is not directed to a shared pool. Reason for Disposition   Patient sounds very sick or weak to the triager    Answer Assessment - Initial Assessment Questions  1. BLOOD PRESSURE: \"What is the blood pressure? \" \"Did you take at least two measurements 5 minutes apart? \"      87/57 and 92/60 wife took 10 minutes to 1pm and she checked it the other day it was 115/60 he was dizzy then     2. ONSET: \"When did you take your blood pressure? \"      Sunday he states started prior to then a lot of last week it was noted       3. HOW: \"How did you obtain the blood pressure? \" (e.g., visiting nurse, automatic home BP monitor)      Arm cuff     4. HISTORY: \"Do you have a history of low blood pressure? \" \"What is your blood pressure normally? \"      He is always really good     5. MEDICATIONS: Tamea Terrance you taking any medications for blood pressure? \" If yes: \"Have they been changed recently? \"      On medication to lower BP lisinopril BID and ASA     6. PULSE RATE: \"Do you know what your pulse rate is? \"

## 2021-08-03 NOTE — TELEPHONE ENCOUNTER
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 He is having problems with low blood pressures  And  High pulse rates. Please call patient for advice. 91/62, pulse 115 - taken several times and have all been in this range.     Call him at [phone]    Triage nurse indicated she spoke with Nikky Wheeler in the office - there isn't anyone here by that name

## 2021-08-04 ENCOUNTER — OFFICE VISIT (OUTPATIENT)
Dept: INTERNAL MEDICINE CLINIC | Age: 66
End: 2021-08-04
Payer: COMMERCIAL

## 2021-08-04 VITALS
WEIGHT: 207 LBS | HEART RATE: 109 BPM | SYSTOLIC BLOOD PRESSURE: 104 MMHG | TEMPERATURE: 97 F | DIASTOLIC BLOOD PRESSURE: 62 MMHG | BODY MASS INDEX: 26.58 KG/M2 | OXYGEN SATURATION: 98 %

## 2021-08-04 DIAGNOSIS — R42 LIGHTHEADEDNESS: ICD-10-CM

## 2021-08-04 DIAGNOSIS — E86.1 HYPOTENSION DUE TO HYPOVOLEMIA: Primary | ICD-10-CM

## 2021-08-04 DIAGNOSIS — I95.89 HYPOTENSION DUE TO HYPOVOLEMIA: Primary | ICD-10-CM

## 2021-08-04 DIAGNOSIS — E11.65 POORLY CONTROLLED DIABETES MELLITUS (HCC): ICD-10-CM

## 2021-08-04 PROCEDURE — 99214 OFFICE O/P EST MOD 30 MIN: CPT | Performed by: NURSE PRACTITIONER

## 2021-08-04 RX ORDER — LISINOPRIL 30 MG/1
30 TABLET ORAL DAILY
Qty: 30 TABLET | Refills: 0 | Status: SHIPPED | OUTPATIENT
Start: 2021-08-04 | End: 2022-05-16 | Stop reason: SDUPTHER

## 2021-08-04 ASSESSMENT — ENCOUNTER SYMPTOMS
CHEST TIGHTNESS: 0
DIARRHEA: 0
SHORTNESS OF BREATH: 0
COUGH: 0
SORE THROAT: 0
CONSTIPATION: 0
SINUS PAIN: 0
VOMITING: 0
NAUSEA: 0

## 2021-08-04 ASSESSMENT — PATIENT HEALTH QUESTIONNAIRE - PHQ9
SUM OF ALL RESPONSES TO PHQ QUESTIONS 1-9: 0
SUM OF ALL RESPONSES TO PHQ QUESTIONS 1-9: 0
1. LITTLE INTEREST OR PLEASURE IN DOING THINGS: 0
2. FEELING DOWN, DEPRESSED OR HOPELESS: 0
SUM OF ALL RESPONSES TO PHQ9 QUESTIONS 1 & 2: 0
SUM OF ALL RESPONSES TO PHQ QUESTIONS 1-9: 0

## 2021-08-04 NOTE — PROGRESS NOTES
[address] [facility] Internal Medicine  [address] [facility], [address]  Tel:[phone]    Parth Celeste is a 61 y.o. male who presents today for his medical conditions/complaints as noted below. Parth Celeste is c/o of Hypertension (BP Issues)      Chief Complaint   Patient presents with    Hypertension     BP Issues       HPI:     Mr. Solitario Parmar presents with concerns of fluctuating/low BP leading to dizziness and a recent fall. He states he has been feeling increasingly dizzy for several weeks. Happened to fall while getting out of shower a couple weeks ago. States he is most dizzy through the day after taking his BP medications. Does not regularly drink water. Usually drinks decaf coffee, diet soda. States HR has been consistently elevated for quite awhile, used to take Bb however made dizzy as well. Has checked his BP at home which showed several instances of BP <100/60 with symptoms. Of note, recent A1C shows >10% as well.  Denies increased urination, hunger, thirst.       Past Medical History:   Diagnosis Date    Allergic rhinitis     Diabetes mellitus ([address]     Dizziness     Hearing loss     Hyperlipidemia     Hypertension         Past Surgical History:   Procedure Laterality Date    SHOULDER ARTHROSCOPY      right    TONSILLECTOMY         Family History   Problem Relation Age of Onset    Cancer Mother         breast    Celiac Disease Daughter     Hashimoto Thyroiditis Daughter        Social History     Tobacco Use    Smoking status: Former Smoker     Packs/day: 1.50     Years: 15.00     Pack years: 22.50     Quit date: 1991     Years since quittin.2    Smokeless tobacco: Former User     Quit date:    Substance Use Topics    Alcohol use: No        Current Outpatient Medications   Medication Sig Dispense Refill    lisinopril (PRINIVIL;ZESTRIL) 30 MG tablet Take 1 tablet by mouth daily 30 tablet 0    Empagliflozin (JARDIANCE PO) Take 25 mg by mouth daily       atorvastatin (LIPITOR) 20 MG tablet 1 daily      glipiZIDE (GLUCOTROL) 5 MG tablet Take 10 mg by mouth daily       metFORMIN (GLUCOPHAGE) 1000 MG tablet Take 1 tablet by mouth 2 times daily (with meals). Needs office visit with Dr. Chuy Campbell prior to additional refills 180 tablet 0    Exenatide (BYDUREON SC) Inject 2 mg into the skin once a week       Omega-3 Fatty Acids (FISH OIL) 1000 MG CAPS Take 3,000 mg by mouth 3 times daily.  aspirin 81 MG EC tablet Take 81 mg by mouth daily.  CINNAMON PO Take 1,000 mg by mouth 2 times daily.  Multiple Vitamin (MULTI-VITAMIN PO) Take  by mouth. No current facility-administered medications for this visit. Allergies   Allergen Reactions    Latex Itching       Subjective:      Review of Systems   Constitutional: Positive for fatigue. Negative for fever. HENT: Negative for sinus pain and sore throat. Respiratory: Negative for cough, chest tightness and shortness of breath. Cardiovascular: Negative for chest pain and palpitations. Gastrointestinal: Negative for constipation, diarrhea, nausea and vomiting. Genitourinary: Negative for dysuria and urgency. Skin: Negative for rash. Neurological: Positive for dizziness, weakness and light-headedness. Objective:     Vitals:    08/04/21 0903 08/04/21 1007   BP: 116/80 104/62   Site: Right Upper Arm    Position: Sitting    Cuff Size: Medium Adult    Pulse: 109    Temp: 97 °F (36.1 °C)    TempSrc: Temporal    SpO2: 98%    Weight: 207 lb (93.9 kg)        Physical Exam  Vitals and nursing note reviewed. Constitutional:       Appearance: Normal appearance. He is well-developed. HENT:      Head: Normocephalic and atraumatic.       Right Ear: Hearing and external ear normal.      Left Ear: Hearing and external ear normal.      Nose: Nose normal.   Eyes:      General: Lids are normal.      Pupils: Pupils are equal, round, and reactive to light. Cardiovascular:      Rate and Rhythm: Regular rhythm. Tachycardia present. No extrasystoles are present. Chest Wall: PMI is not displaced. Pulses: Normal pulses. Heart sounds: Normal heart sounds, S1 normal and S2 normal. Heart sounds not distant. No murmur heard. No systolic murmur is present. No diastolic murmur is present. No friction rub. No gallop. No S3 or S4 sounds. Pulmonary:      Effort: Pulmonary effort is normal. No accessory muscle usage or respiratory distress. Breath sounds: Normal breath sounds. No decreased breath sounds, wheezing, rhonchi or rales. Abdominal:      General: Bowel sounds are normal.      Palpations: Abdomen is soft. Musculoskeletal:      Cervical back: Normal range of motion. Skin:     General: Skin is warm and dry. Neurological:      Mental Status: He is alert. Psychiatric:         Speech: Speech normal.         Assessment & Plan: The following diagnoses and conditions are stable with no further orders unless indicated:    1. Hypotension due to hypovolemia    2. Lightheadedness    3. Poorly controlled diabetes mellitus ([address]        Rufus Murphy was seen today for hypertension. Diagnoses and all orders for this visit:    Hypotension due to hypovolemia  -     lisinopril (PRINIVIL;ZESTRIL) 30 MG tablet; Take 1 tablet by mouth daily    Lightheadedness    Poorly controlled diabetes mellitus ([address]    Will decrease dose of lisinopril slightly to improve BP. Encouraged 60-80 oz water daily. Eliminate caffeine completely if possible. Monitor BP twice daily. Consider echo/cards referral if symptoms persist.     Also encouraged improving diabetic control as this furthers dehydration by offloading of excess glucose through the urine, not to mention jardiance use. Report BPs to us after 1 week    Return if symptoms worsen or fail to improve.       Patientshould call the office immediately with new or ongoing signs or symptoms or worsening, or proceed to the emergency room. If you are on medications which could impair your senses, you are at risk of weakness, falls,dizziness, or drowsiness. You should be careful during activities which could place you at risk of harm, such as climbing, using stairs, operating machinery, or driving vehicles. If you feel you cannot safely do theseactivities, you should request others to help you, or avoid the activities altogether. If you are drowsy for any other reason, you should use the same precautions as listed above. Call if pattern of symptoms change or persists for an extended time.       Myra Mejia

## 2021-10-22 DIAGNOSIS — E11.9 DM (DIABETES MELLITUS) (HCC): ICD-10-CM

## 2021-11-04 ENCOUNTER — OFFICE VISIT (OUTPATIENT)
Dept: FAMILY MEDICINE CLINIC | Age: 66
End: 2021-11-04
Payer: COMMERCIAL

## 2021-11-04 VITALS — DIASTOLIC BLOOD PRESSURE: 68 MMHG | SYSTOLIC BLOOD PRESSURE: 130 MMHG | HEART RATE: 96 BPM | OXYGEN SATURATION: 97 %

## 2021-11-04 DIAGNOSIS — I15.2 HYPERTENSION ASSOCIATED WITH DIABETES (HCC): ICD-10-CM

## 2021-11-04 DIAGNOSIS — E11.69 HYPERLIPIDEMIA ASSOCIATED WITH TYPE 2 DIABETES MELLITUS (HCC): ICD-10-CM

## 2021-11-04 DIAGNOSIS — Z76.89 ENCOUNTER TO ESTABLISH CARE: Primary | ICD-10-CM

## 2021-11-04 DIAGNOSIS — E11.59 HYPERTENSION ASSOCIATED WITH DIABETES (HCC): ICD-10-CM

## 2021-11-04 DIAGNOSIS — E11.65 TYPE 2 DIABETES MELLITUS WITH HYPERGLYCEMIA, WITHOUT LONG-TERM CURRENT USE OF INSULIN (HCC): ICD-10-CM

## 2021-11-04 DIAGNOSIS — E78.5 HYPERLIPIDEMIA ASSOCIATED WITH TYPE 2 DIABETES MELLITUS (HCC): ICD-10-CM

## 2021-11-04 DIAGNOSIS — Z12.11 SCREEN FOR COLON CANCER: ICD-10-CM

## 2021-11-04 LAB — HBA1C MFR BLD: 8.5 %

## 2021-11-04 PROCEDURE — 99203 OFFICE O/P NEW LOW 30 MIN: CPT | Performed by: STUDENT IN AN ORGANIZED HEALTH CARE EDUCATION/TRAINING PROGRAM

## 2021-11-04 PROCEDURE — 3052F HG A1C>EQUAL 8.0%<EQUAL 9.0%: CPT | Performed by: STUDENT IN AN ORGANIZED HEALTH CARE EDUCATION/TRAINING PROGRAM

## 2021-11-04 PROCEDURE — 83036 HEMOGLOBIN GLYCOSYLATED A1C: CPT | Performed by: STUDENT IN AN ORGANIZED HEALTH CARE EDUCATION/TRAINING PROGRAM

## 2021-11-04 SDOH — ECONOMIC STABILITY: FOOD INSECURITY: WITHIN THE PAST 12 MONTHS, YOU WORRIED THAT YOUR FOOD WOULD RUN OUT BEFORE YOU GOT MONEY TO BUY MORE.: NEVER TRUE

## 2021-11-04 SDOH — ECONOMIC STABILITY: HOUSING INSECURITY: IN THE LAST 12 MONTHS, HOW MANY PLACES HAVE YOU LIVED?: 1

## 2021-11-04 SDOH — ECONOMIC STABILITY: TRANSPORTATION INSECURITY
IN THE PAST 12 MONTHS, HAS THE LACK OF TRANSPORTATION KEPT YOU FROM MEDICAL APPOINTMENTS OR FROM GETTING MEDICATIONS?: NO

## 2021-11-04 SDOH — ECONOMIC STABILITY: INCOME INSECURITY: IN THE LAST 12 MONTHS, WAS THERE A TIME WHEN YOU WERE NOT ABLE TO PAY THE MORTGAGE OR RENT ON TIME?: NO

## 2021-11-04 SDOH — ECONOMIC STABILITY: TRANSPORTATION INSECURITY
IN THE PAST 12 MONTHS, HAS LACK OF TRANSPORTATION KEPT YOU FROM MEETINGS, WORK, OR FROM GETTING THINGS NEEDED FOR DAILY LIVING?: NO

## 2021-11-04 SDOH — ECONOMIC STABILITY: FOOD INSECURITY: WITHIN THE PAST 12 MONTHS, THE FOOD YOU BOUGHT JUST DIDN'T LAST AND YOU DIDN'T HAVE MONEY TO GET MORE.: NEVER TRUE

## 2021-11-04 SDOH — ECONOMIC STABILITY: HOUSING INSECURITY
IN THE LAST 12 MONTHS, WAS THERE A TIME WHEN YOU DID NOT HAVE A STEADY PLACE TO SLEEP OR SLEPT IN A SHELTER (INCLUDING NOW)?: NO

## 2021-11-04 ASSESSMENT — SOCIAL DETERMINANTS OF HEALTH (SDOH): HOW HARD IS IT FOR YOU TO PAY FOR THE VERY BASICS LIKE FOOD, HOUSING, MEDICAL CARE, AND HEATING?: NOT HARD AT ALL
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
## 2021-11-12 LAB
BUN BLDV-MCNC: 18 MG/DL
CALCIUM SERPL-MCNC: 10 MG/DL
CHLORIDE BLD-SCNC: 101 MMOL/L
CO2: 29 MMOL/L
CREAT SERPL-MCNC: 0.81 MG/DL
GFR CALCULATED: 92
GLUCOSE BLD-MCNC: 206 MG/DL
POTASSIUM SERPL-SCNC: 5 MMOL/L
SODIUM BLD-SCNC: 138 MMOL/L

## 2021-11-15 LAB
A/G RATIO: NORMAL
ALBUMIN SERPL-MCNC: 4.6 G/DL
ALP BLD-CCNC: 65 U/L
ALT SERPL-CCNC: 28 U/L
AST SERPL-CCNC: 18 U/L
BILIRUB SERPL-MCNC: 0.8 MG/DL (ref 0.1–1.4)
BILIRUBIN DIRECT: 0.2 MG/DL
BILIRUBIN, INDIRECT: NORMAL
CHOLESTEROL, TOTAL: 125 MG/DL
CHOLESTEROL/HDL RATIO: ABNORMAL
CREATININE URINE POCT: 56.1
GLOBULIN: NORMAL
HDLC SERPL-MCNC: 34 MG/DL (ref 35–70)
LDL CHOLESTEROL CALCULATED: 55 MG/DL (ref 0–160)
MICROALBUMIN/CREAT 24H UR: 20.7 MG/G{CREAT}
MICROALBUMIN/CREAT UR-RTO: 37
NONHDLC SERPL-MCNC: 91 MG/DL
PROTEIN TOTAL: 6.9 G/DL
TRIGL SERPL-MCNC: 220 MG/DL
VLDLC SERPL CALC-MCNC: ABNORMAL MG/DL

## 2022-03-23 ENCOUNTER — TELEPHONE (OUTPATIENT)
Dept: FAMILY MEDICINE CLINIC | Age: 67
End: 2022-03-23

## 2022-03-23 NOTE — TELEPHONE ENCOUNTER
Called patient to inform he is due for CRC screening. Spoke with patients wife, she will have patient call office back. Patient maybe a candidate for cologuard if he would prefer that method (would need to confirm patient hasnt had any GI issues or stool changes/bleeding) or he can see Dr Adeola Steinberg per referral placed by PCP. Patient wife states she will have him call office back to discuss.

## 2022-04-01 LAB — DIABETIC RETINOPATHY: NEGATIVE

## 2022-05-05 ENCOUNTER — NURSE TRIAGE (OUTPATIENT)
Dept: OTHER | Facility: CLINIC | Age: 67
End: 2022-05-05

## 2022-05-05 ENCOUNTER — HOSPITAL ENCOUNTER (OUTPATIENT)
Dept: GENERAL RADIOLOGY | Age: 67
Discharge: HOME | End: 2022-05-05
Payer: COMMERCIAL

## 2022-05-05 ENCOUNTER — HOSPITAL ENCOUNTER (OUTPATIENT)
Age: 67
Discharge: HOME | End: 2022-05-05
Payer: COMMERCIAL

## 2022-05-05 ENCOUNTER — OFFICE VISIT (OUTPATIENT)
Dept: FAMILY MEDICINE CLINIC | Age: 67
End: 2022-05-05
Payer: COMMERCIAL

## 2022-05-05 VITALS
WEIGHT: 207 LBS | OXYGEN SATURATION: 97 % | BODY MASS INDEX: 28.04 KG/M2 | DIASTOLIC BLOOD PRESSURE: 70 MMHG | SYSTOLIC BLOOD PRESSURE: 116 MMHG | HEART RATE: 103 BPM | HEIGHT: 72 IN | TEMPERATURE: 97.9 F

## 2022-05-05 DIAGNOSIS — L03.011 CELLULITIS OF RIGHT INDEX FINGER: Primary | ICD-10-CM

## 2022-05-05 DIAGNOSIS — L03.011 CELLULITIS OF RIGHT INDEX FINGER: ICD-10-CM

## 2022-05-05 PROCEDURE — 73130 X-RAY EXAM OF HAND: CPT

## 2022-05-05 PROCEDURE — 99214 OFFICE O/P EST MOD 30 MIN: CPT | Performed by: PHYSICIAN ASSISTANT

## 2022-05-05 RX ORDER — GREEN TEA/HOODIA GORDONII 315-12.5MG
1 CAPSULE ORAL 2 TIMES DAILY
Qty: 60 TABLET | Refills: 0 | Status: SHIPPED | OUTPATIENT
Start: 2022-05-05 | End: 2022-06-04

## 2022-05-05 RX ORDER — CLINDAMYCIN HYDROCHLORIDE 300 MG/1
300 CAPSULE ORAL 4 TIMES DAILY
Qty: 40 CAPSULE | Refills: 0 | Status: SHIPPED | OUTPATIENT
Start: 2022-05-05 | End: 2022-05-15

## 2022-05-05 ASSESSMENT — ENCOUNTER SYMPTOMS
ABDOMINAL PAIN: 0
SORE THROAT: 0
NAUSEA: 0
VOMITING: 0
DIARRHEA: 0
COUGH: 0
CONSTIPATION: 0
SHORTNESS OF BREATH: 0
RHINORRHEA: 0

## 2022-05-05 NOTE — TELEPHONE ENCOUNTER
Received call from [facility] with Red Flag Complaint. Subjective: Caller states \"I cut my finger on Saturday and it is now red and swollen\"     Current Symptoms: right index finger is red and swollen and warm to touch    Onset: 6 day ago; worsening    Associated Symptoms: NA    Pain Severity: \"tender to touch\"    Temperature: denies     What has been tried: NA    LMP: NA Pregnant: NA    Recommended disposition: See in Office Today    Care advice provided, patient verbalizes understanding; denies any other questions or concerns; instructed to call back for any new or worsening symptoms. Patient/Caller agrees with recommended disposition; writer provided warm transfer to [place] [facility] for appointment scheduling     Attention Provider: Thank you for allowing me to participate in the care of your patient. The patient was connected to triage in response to information provided to the ECC/PSC. Please do not respond through this encounter as the response is not directed to a shared pool.         Reason for Disposition   Skin redness around the wound larger than 2 inches (5 cm)    Protocols used: WOUND INFECTION-ADULT-[place]

## 2022-05-05 NOTE — PROGRESS NOTES
2022  Bipin Caballero (: 1955)  61 y.o.    ASSESSMENT and PLAN:  Meghann Godwin was seen today for finger pain. Diagnoses and all orders for this visit:    Cellulitis of right index finger  -     XR HAND RIGHT (MIN 3 VIEWS); Future  -     clindamycin (CLEOCIN) 300 MG capsule; Take 1 capsule by mouth 4 times daily for 10 days  -     Probiotic Acidophilus (FLORANEX) TABS; Take 1 tablet by mouth 2 times daily  - xray today for possible osteo, increased risk given uncontrolled DM. start clinda. Close follow up, Monday or Tuesday of next week. To ER over the weekend with worsening sxs. Return Monday or Tuesday of next week. Ana Angel HPI  Knicked right index finger on microwave wiring 6 days ago   Started with swelling and redness 5 days ago. Has used topical neopsorin. Redness has extended up the arm. No fevers, chills  UTD on tetanus  Uncontrolled DM last a1c 8.9  Denies history of MRSA. Did have left prepatellar abscess that required hospitalization  with iv abx (vanc). MRSA probe at that time neg and blood cultures neg as well- I do not see joint asp or I&D culture. Review of Systems   Constitutional: Negative for activity change, chills and fever. HENT: Negative for congestion, ear pain, rhinorrhea and sore throat. Eyes: Negative for visual disturbance. Respiratory: Negative for cough and shortness of breath. Cardiovascular: Negative for chest pain and palpitations. Gastrointestinal: Negative for abdominal pain, constipation, diarrhea, nausea and vomiting. Genitourinary: Negative for difficulty urinating and dysuria. Musculoskeletal: Positive for arthralgias (right finger pain and swelling). Negative for myalgias. Skin: Negative for rash. Neurological: Negative for dizziness, weakness and numbness. Psychiatric/Behavioral: Negative for sleep disturbance.        Allergies, past medical history, family history, and social history reviewed and unchanged from previous encounter. Current Outpatient Medications   Medication Sig Dispense Refill    clindamycin (CLEOCIN) 300 MG capsule Take 1 capsule by mouth 4 times daily for 10 days 40 capsule 0    Probiotic Acidophilus (FLORANEX) TABS Take 1 tablet by mouth 2 times daily 60 tablet 0    lisinopril (PRINIVIL;ZESTRIL) 30 MG tablet Take 1 tablet by mouth daily 30 tablet 0    Empagliflozin (JARDIANCE PO) Take 25 mg by mouth daily       atorvastatin (LIPITOR) 20 MG tablet 1 daily      glipiZIDE (GLUCOTROL) 5 MG tablet Take 10 mg by mouth daily       metFORMIN (GLUCOPHAGE) 1000 MG tablet Take 1 tablet by mouth 2 times daily (with meals). Needs office visit with Dr. Shelly Weems prior to additional refills 180 tablet 0    Exenatide (BYDUREON SC) Inject 2 mg into the skin once a week       Omega-3 Fatty Acids (FISH OIL) 1000 MG CAPS Take 3,000 mg by mouth 3 times daily.  aspirin 81 MG EC tablet Take 81 mg by mouth daily.  CINNAMON PO Take 1,000 mg by mouth 2 times daily.  Multiple Vitamin (MULTI-VITAMIN PO) Take  by mouth. No current facility-administered medications for this visit. Vitals:    05/05/22 0837   BP: 116/70   Site: Right Upper Arm   Position: Sitting   Cuff Size: Large Adult   Pulse: 103   Temp: 97.9 °F (36.6 °C)   TempSrc: Oral   SpO2: 97%   Weight: 207 lb (93.9 kg)   Height: 5' 11.5\" (1.816 m)     Estimated body mass index is 28.47 kg/m² as calculated from the following:    Height as of this encounter: 5' 11.5\" (1.816 m). Weight as of this encounter: 207 lb (93.9 kg). Physical Exam  Constitutional:       General: He is not in acute distress. Appearance: He is well-developed. HENT:      Head: Normocephalic and atraumatic. Eyes:      Conjunctiva/sclera: Conjunctivae normal.      Pupils: Pupils are equal, round, and reactive to light. Cardiovascular:      Rate and Rhythm: Normal rate and regular rhythm. Heart sounds: Normal heart sounds.  No murmur

## 2022-05-09 ENCOUNTER — TELEPHONE (OUTPATIENT)
Dept: FAMILY MEDICINE CLINIC | Age: 67
End: 2022-05-09

## 2022-05-09 ENCOUNTER — OFFICE VISIT (OUTPATIENT)
Dept: FAMILY MEDICINE CLINIC | Age: 67
End: 2022-05-09
Payer: COMMERCIAL

## 2022-05-09 VITALS
WEIGHT: 206 LBS | TEMPERATURE: 97.5 F | SYSTOLIC BLOOD PRESSURE: 118 MMHG | HEART RATE: 99 BPM | BODY MASS INDEX: 27.9 KG/M2 | HEIGHT: 72 IN | DIASTOLIC BLOOD PRESSURE: 72 MMHG | OXYGEN SATURATION: 97 %

## 2022-05-09 DIAGNOSIS — E11.65 TYPE 2 DIABETES MELLITUS WITH HYPERGLYCEMIA, WITHOUT LONG-TERM CURRENT USE OF INSULIN (HCC): ICD-10-CM

## 2022-05-09 DIAGNOSIS — L03.011 CELLULITIS OF RIGHT INDEX FINGER: Primary | ICD-10-CM

## 2022-05-09 PROCEDURE — 99214 OFFICE O/P EST MOD 30 MIN: CPT | Performed by: STUDENT IN AN ORGANIZED HEALTH CARE EDUCATION/TRAINING PROGRAM

## 2022-05-09 RX ORDER — AMOXICILLIN 875 MG/1
875 TABLET, COATED ORAL 2 TIMES DAILY
Qty: 14 TABLET | Refills: 0 | Status: SHIPPED | OUTPATIENT
Start: 2022-05-09 | End: 2022-05-16

## 2022-05-09 RX ORDER — DOXYCYCLINE HYCLATE 100 MG
100 TABLET ORAL 2 TIMES DAILY
Qty: 14 TABLET | Refills: 0 | Status: SHIPPED | OUTPATIENT
Start: 2022-05-09 | End: 2022-05-16

## 2022-05-09 NOTE — LETTER
[address]  [facility] [address]  Phone: [phone]  Fax: [phone]    Marina Huynh DO        May 9, 2022     Patient: Nils Childers   YOB: 1955   Date of Visit: 5/9/2022       To Whom it May Concern:    Gayla Sultana was seen in my clinic on 5/9/2022. He may return to work on 5/11/22. If you have any questions or concerns, please don't hesitate to call.     Sincerely,         Marina Huynh DO

## 2022-05-09 NOTE — PROGRESS NOTES
Patient: Deonte Osborne is a 61 y.o. male who presents today with the following Chief Complaint(s):  Chief Complaint   Patient presents with    Other     Follow up infection          HPI     Patient following up on right index finger cellulitis that was injured on microwave casing 4/28. Started clindamycin on 5/5. Xray without signs of osteo. uptodate on tetanus. Reports he feels minor improvement with clindamycin but still having swelling, redness, and pain especially on bennett aspect of finger. Current Outpatient Medications   Medication Sig Dispense Refill    amoxicillin (AMOXIL) 875 MG tablet Take 1 tablet by mouth 2 times daily for 7 days 14 tablet 0    doxycycline hyclate (VIBRA-TABS) 100 MG tablet Take 1 tablet by mouth 2 times daily for 7 days 14 tablet 0    clindamycin (CLEOCIN) 300 MG capsule Take 1 capsule by mouth 4 times daily for 10 days 40 capsule 0    Probiotic Acidophilus (FLORANEX) TABS Take 1 tablet by mouth 2 times daily 60 tablet 0    lisinopril (PRINIVIL;ZESTRIL) 30 MG tablet Take 1 tablet by mouth daily 30 tablet 0    Empagliflozin (JARDIANCE PO) Take 25 mg by mouth daily       atorvastatin (LIPITOR) 20 MG tablet 1 daily      glipiZIDE (GLUCOTROL) 5 MG tablet Take 10 mg by mouth daily       metFORMIN (GLUCOPHAGE) 1000 MG tablet Take 1 tablet by mouth 2 times daily (with meals). Needs office visit with Dr. Chuy Campbell prior to additional refills 180 tablet 0    Exenatide (BYDUREON SC) Inject 2 mg into the skin once a week       Omega-3 Fatty Acids (FISH OIL) 1000 MG CAPS Take 3,000 mg by mouth 3 times daily.  aspirin 81 MG EC tablet Take 81 mg by mouth daily.  CINNAMON PO Take 1,000 mg by mouth 2 times daily.  Multiple Vitamin (MULTI-VITAMIN PO) Take  by mouth. No current facility-administered medications for this visit.        Patient's past medical history, surgical history, family history, medications,  andallergies  were all reviewed and updated as appropriate today. Review of Systems  All other systems reviewed and negative    Physical Exam         Neurovascularly intact. ROM limited by swelling. Vitals:    05/09/22 1127   BP: 118/72   Pulse: 99   Temp: 97.5 °F (36.4 °C)   SpO2: 97%       Assessment:  Encounter Diagnosis   Name Primary?  Cellulitis of right index finger Yes       Plan:  1. Cellulitis of right index finger/uncontrolled DM2  Appears to have less improvement than desired on clindamycin therapy. Will transition. Discussed importance of diabetic control to improve healing. Also discussed risks of Cdif with antibiotic transition. Will monitor for improvement over next 48 hours and send photos with update. Also discussed risks of compartment syndrome and need for ED evaluation if showing signs.   - amoxicillin (AMOXIL) 875 MG tablet; Take 1 tablet by mouth 2 times daily for 7 days  Dispense: 14 tablet; Refill: 0  - doxycycline hyclate (VIBRA-TABS) 100 MG tablet; Take 1 tablet by mouth 2 times daily for 7 days  Dispense: 14 tablet; Refill: 0        No follow-ups on file.

## 2022-06-15 ENCOUNTER — OFFICE VISIT (OUTPATIENT)
Dept: FAMILY MEDICINE CLINIC | Age: 67
End: 2022-06-15
Payer: COMMERCIAL

## 2022-06-15 VITALS
DIASTOLIC BLOOD PRESSURE: 78 MMHG | HEART RATE: 94 BPM | WEIGHT: 206 LBS | BODY MASS INDEX: 28.33 KG/M2 | OXYGEN SATURATION: 98 % | SYSTOLIC BLOOD PRESSURE: 130 MMHG

## 2022-06-15 DIAGNOSIS — E11.59 HYPERTENSION ASSOCIATED WITH DIABETES (HCC): ICD-10-CM

## 2022-06-15 DIAGNOSIS — E78.5 HYPERLIPIDEMIA ASSOCIATED WITH TYPE 2 DIABETES MELLITUS (HCC): ICD-10-CM

## 2022-06-15 DIAGNOSIS — I15.2 HYPERTENSION ASSOCIATED WITH DIABETES (HCC): ICD-10-CM

## 2022-06-15 DIAGNOSIS — L03.011 CELLULITIS OF RIGHT INDEX FINGER: ICD-10-CM

## 2022-06-15 DIAGNOSIS — M79.89 SWELLING OF INDEX FINGER: ICD-10-CM

## 2022-06-15 DIAGNOSIS — E11.69 HYPERLIPIDEMIA ASSOCIATED WITH TYPE 2 DIABETES MELLITUS (HCC): ICD-10-CM

## 2022-06-15 DIAGNOSIS — E11.65 TYPE 2 DIABETES MELLITUS WITH HYPERGLYCEMIA, WITHOUT LONG-TERM CURRENT USE OF INSULIN (HCC): Primary | ICD-10-CM

## 2022-06-15 LAB — HBA1C MFR BLD: 9.1 %

## 2022-06-15 PROCEDURE — 99214 OFFICE O/P EST MOD 30 MIN: CPT | Performed by: STUDENT IN AN ORGANIZED HEALTH CARE EDUCATION/TRAINING PROGRAM

## 2022-06-15 PROCEDURE — 3046F HEMOGLOBIN A1C LEVEL >9.0%: CPT | Performed by: STUDENT IN AN ORGANIZED HEALTH CARE EDUCATION/TRAINING PROGRAM

## 2022-06-15 PROCEDURE — 83036 HEMOGLOBIN GLYCOSYLATED A1C: CPT | Performed by: STUDENT IN AN ORGANIZED HEALTH CARE EDUCATION/TRAINING PROGRAM

## 2022-06-15 PROCEDURE — 1123F ACP DISCUSS/DSCN MKR DOCD: CPT | Performed by: STUDENT IN AN ORGANIZED HEALTH CARE EDUCATION/TRAINING PROGRAM

## 2022-06-15 RX ORDER — LISINOPRIL 30 MG/1
30 TABLET ORAL DAILY
Qty: 90 TABLET | Refills: 1 | Status: SHIPPED | OUTPATIENT
Start: 2022-06-15

## 2022-06-15 RX ORDER — ATORVASTATIN CALCIUM 20 MG/1
20 TABLET, FILM COATED ORAL DAILY
Qty: 90 TABLET | Refills: 1 | Status: SHIPPED | OUTPATIENT
Start: 2022-06-15

## 2022-06-15 RX ORDER — EXENATIDE 2 MG/.65ML
2 INJECTION, SUSPENSION, EXTENDED RELEASE SUBCUTANEOUS WEEKLY
Qty: 12 PEN | Refills: 1 | Status: SHIPPED | OUTPATIENT
Start: 2022-06-15 | End: 2022-10-11

## 2022-06-15 RX ORDER — GLIPIZIDE 10 MG/1
10 TABLET ORAL
Qty: 180 TABLET | Refills: 1 | Status: ON HOLD
Start: 2022-06-15 | End: 2022-11-03 | Stop reason: HOSPADM

## 2022-06-15 NOTE — PROGRESS NOTES
Patient: Popeye Mayfield is a 74 y.o. male who presents today with the following Chief Complaint(s):  Chief Complaint   Patient presents with    Diabetes         HPI     DM2  - Diagnosed roughly 25 years ago  On metformin 1000 mg twice daily  Glipizide 10 mg twice a day  Bydurian 2mg once a week  Jardiance 25mg  Lipitor 20mg  Lisinopril 30 mg daily  Previously following with endocrinologist.  Does not follow diabetic diet. Has seen dietician previously. Has switched to smart carb bread for last 1 week. HTN  BP at goal on lisniopril    HLD  At goal on lipitor on last check   Current Outpatient Medications   Medication Sig Dispense Refill    Exenatide (BYDUREON) 2 MG PEN Inject 1 pen into the skin once a week 12 pen 1    empagliflozin (JARDIANCE) 25 MG tablet Take 1 tablet by mouth daily 90 tablet 1    lisinopril (PRINIVIL;ZESTRIL) 30 MG tablet Take 1 tablet by mouth daily 90 tablet 1    glipiZIDE (GLUCOTROL) 10 MG tablet Take 1 tablet by mouth 2 times daily (before meals) 180 tablet 1    metFORMIN (GLUCOPHAGE) 1000 MG tablet Take 1 tablet by mouth 2 times daily (with meals) 180 tablet 1    atorvastatin (LIPITOR) 20 MG tablet Take 1 tablet by mouth daily 90 tablet 1    Omega-3 Fatty Acids (FISH OIL) 1000 MG CAPS Take 3,000 mg by mouth 3 times daily.  aspirin 81 MG EC tablet Take 81 mg by mouth daily.  CINNAMON PO Take 1,000 mg by mouth 2 times daily.  Multiple Vitamin (MULTI-VITAMIN PO) Take  by mouth. No current facility-administered medications for this visit. Patient's past medical history, surgical history, family history, medications,  andallergies  were all reviewed and updated as appropriate today. Review of Systems  All other systems reviewed and negative    Physical Exam  Vitals reviewed. Constitutional:       Appearance: Normal appearance. HENT:      Head: Normocephalic and atraumatic.    Cardiovascular:      Rate and Rhythm: Normal rate and regular rhythm. Pulmonary:      Effort: Pulmonary effort is normal.      Breath sounds: Normal breath sounds. Musculoskeletal:      Comments: Continued swelling in PIP of index finger on right hand with limited mobility due to swelling   Neurological:      General: No focal deficit present. Mental Status: He is alert and oriented to person, place, and time. Psychiatric:         Behavior: Behavior normal.         Thought Content: Thought content normal.       Vitals:    06/15/22 1006   BP: 130/78   Pulse: 94   SpO2: 98%       Assessment:  Encounter Diagnoses   Name Primary?  Type 2 diabetes mellitus with hyperglycemia, without long-term current use of insulin ([facility] Yes    Hypertension associated with diabetes ([address]     Hyperlipidemia associated with type 2 diabetes mellitus ([address]     Hypotension due to hypovolemia     Cellulitis of right index finger     Swelling of index finger        Plan:  1. Type 2 diabetes mellitus with hyperglycemia, without long-term current use of insulin ([facility] [address]  Discussed that patient has maximized on insulin therapies and that further medication changes would likely include addition of insulin. Patient highly motivated to modify diet and would like to try an additional 3 months to further control his blood sugar. Currently 9.1 today. - POCT glycosylated hemoglobin (Hb A1C)  - Exenatide (BYDUREON) 2 MG PEN; Inject 1 pen into the skin once a week  Dispense: 12 pen; Refill: 1  - empagliflozin (JARDIANCE) 25 MG tablet; Take 1 tablet by mouth daily  Dispense: 90 tablet; Refill: 1  - lisinopril (PRINIVIL;ZESTRIL) 30 MG tablet; Take 1 tablet by mouth daily  Dispense: 90 tablet; Refill: 1  - glipiZIDE (GLUCOTROL) 10 MG tablet; Take 1 tablet by mouth 2 times daily (before meals)  Dispense: 180 tablet; Refill: 1  - metFORMIN (GLUCOPHAGE) 1000 MG tablet; Take 1 tablet by mouth 2 times daily (with meals)  Dispense: 180 tablet; Refill: 1  - atorvastatin (LIPITOR) 20 MG tablet;  Take 1 tablet by mouth daily  Dispense: 90 tablet; Refill: 1    2. Hypertension associated with diabetes ([facility]  At goal today  - lisinopril (PRINIVIL;ZESTRIL) 30 MG tablet; Take 1 tablet by mouth daily  Dispense: 90 tablet; Refill: 1    3. Hyperlipidemia associated with type 2 diabetes mellitus ([facility] [address]  At goal on last check. We will continue Lipitor. 5. Cellulitis of right index finger  6. Swelling of index finger  Patient with history of cellulitis but initiated swelling and index finger. Infectious signs improved including streaking. However continues to have swelling and pain and PIP joint. Will refer to Ortho for further evaluation treatment  - External Referral To Hand Surgery        Return in about 3 months (around 9/15/2022).

## 2022-07-11 ENCOUNTER — TELEPHONE (OUTPATIENT)
Dept: FAMILY MEDICINE CLINIC | Age: 67
End: 2022-07-11

## 2022-07-11 ENCOUNTER — TELEMEDICINE (OUTPATIENT)
Dept: PRIMARY CARE CLINIC | Age: 67
End: 2022-07-11
Payer: COMMERCIAL

## 2022-07-11 DIAGNOSIS — R05.9 COUGH: ICD-10-CM

## 2022-07-11 DIAGNOSIS — U07.1 COVID-19: Primary | ICD-10-CM

## 2022-07-11 DIAGNOSIS — R06.02 SOB (SHORTNESS OF BREATH): ICD-10-CM

## 2022-07-11 PROCEDURE — 1123F ACP DISCUSS/DSCN MKR DOCD: CPT | Performed by: NURSE PRACTITIONER

## 2022-07-11 PROCEDURE — 99213 OFFICE O/P EST LOW 20 MIN: CPT | Performed by: NURSE PRACTITIONER

## 2022-07-11 RX ORDER — ALBUTEROL SULFATE 90 UG/1
2 AEROSOL, METERED RESPIRATORY (INHALATION) EVERY 4 HOURS PRN
Qty: 18 G | Refills: 3 | Status: SHIPPED | OUTPATIENT
Start: 2022-07-11

## 2022-07-11 RX ORDER — BENZONATATE 100 MG/1
100-200 CAPSULE ORAL 3 TIMES DAILY PRN
Qty: 40 CAPSULE | Refills: 0 | Status: SHIPPED | OUTPATIENT
Start: 2022-07-11 | End: 2022-07-18

## 2022-07-11 ASSESSMENT — ENCOUNTER SYMPTOMS
WHEEZING: 1
DIARRHEA: 1
SORE THROAT: 0
SHORTNESS OF BREATH: 1
COUGH: 1

## 2022-07-11 NOTE — PROGRESS NOTES
2022    TELEHEALTH EVALUATION -- Audio/Visual (During LLNJP-80 public health emergency)  Chief Complaint   Patient presents with    Positive For Covid-19    Cough    Congestion    Fatigue    Generalized Body Aches     HPI:    Yasmin Adams Ridzuri (:  1955) has requested an audio/video evaluation for the following concern(s):    Started with symptoms Thursday Test positive COVID yesterday. Fever/chills, fatigue, lightheaded, diarrhea started this morning, muscle aches,  Cough, congestion, wheezing, SOB. Wife and daughter are also positive. Pulse Ox 93% at home today. BP low at 78/55 and wife retook and 84/65, pulse 102. Has been sleeping a lot through the weekend really tired. Patient has not been hydrating. Does take Lisinopril 30 mg daily took last night. Also has diabetes and hyperlipidemia. Meets for Paxlovid. Will need work note. Review of Systems   Constitutional: Positive for appetite change (but no loss of taste or smell), chills and fatigue. Negative for fever. HENT: Positive for congestion. Negative for sore throat. Respiratory: Positive for cough, shortness of breath and wheezing. Cardiovascular: Negative. Gastrointestinal: Positive for diarrhea. Genitourinary: Negative. Musculoskeletal: Positive for myalgias. Skin: Negative. Neurological: Positive for dizziness and headaches. Psychiatric/Behavioral: Negative. Prior to Visit Medications    Medication Sig Taking? Authorizing Provider   nirmatrelvir/ritonavir (PAXLOVID) 20 x 150 MG & 10 x 100MG Take 3 tablets (two 150 mg nirmatrelvir and one 100 mg ritonavir tablets) by mouth every 12 hours for 5 days. Stop Atorvastatin while taking this medication can resume taking 3 days after last dose.  Yes KALEIGH Chandler CNP   albuterol sulfate HFA (PROVENTIL HFA) 108 (90 Base) MCG/ACT inhaler Inhale 2 puffs into the lungs every 4 hours as needed for Wheezing or Shortness of Breath Yes KALEIGH Chandler CNP benzonatate (TESSALON) 100 MG capsule Take 1-2 capsules by mouth 3 times daily as needed for Cough Yes Preston Ramírez, APRN - CNP   Exenatide (BYDUREON) 2 MG PEN Inject 1 pen into the skin once a week  Moustapha De La Vega DO   empagliflozin (JARDIANCE) 25 MG tablet Take 1 tablet by mouth daily  Moustapha De La Vega DO   lisinopril (PRINIVIL;ZESTRIL) 30 MG tablet Take 1 tablet by mouth daily  Moustapha De La Vega DO   glipiZIDE (GLUCOTROL) 10 MG tablet Take 1 tablet by mouth 2 times daily (before meals)  Moustapha De La Vega DO   metFORMIN (GLUCOPHAGE) 1000 MG tablet Take 1 tablet by mouth 2 times daily (with meals)  Moustapha De La Vega DO   atorvastatin (LIPITOR) 20 MG tablet Take 1 tablet by mouth daily  Kiki Espino,    Omega-3 Fatty Acids (FISH OIL) 1000 MG CAPS Take 3,000 mg by mouth 3 times daily. Historical Provider, MD   aspirin 81 MG EC tablet Take 81 mg by mouth daily. Historical Provider, MD   CINNAMON PO Take 1,000 mg by mouth 2 times daily. Historical Provider, MD   Multiple Vitamin (MULTI-VITAMIN PO) Take  by mouth.     Historical Provider, MD       Social History     Tobacco Use    Smoking status: Former Smoker     Packs/day: 1.50     Years: 15.00     Pack years: 22.50     Quit date: 1991     Years since quittin.2    Smokeless tobacco: Former User     Quit date:    Vaping Use    Vaping Use: Never used   Substance Use Topics    Alcohol use: No    Drug use: No        Allergies   Allergen Reactions    Latex Itching   ,   Past Medical History:   Diagnosis Date    Allergic rhinitis     [facility]     Dizziness     Hearing loss     Hyperlipidemia     Hypertension    ,   Past Surgical History:   Procedure Laterality Date    SHOULDER ARTHROSCOPY      right    TONSILLECTOMY     ,   Social History     Tobacco Use    Smoking status: Former Smoker     Packs/day: 1.50     Years: 15.00     Pack years: 22.50     Quit date: 1991     Years since quittin.2    Smokeless tobacco: Former User     Quit date: 1980   Vaping Use    Vaping Use: Never used   Substance Use Topics    Alcohol use: No    Drug use: No   ,   Family History   Problem Relation Age of Onset    Cancer Mother         breast    Celiac Disease Daughter     Hashimoto Thyroiditis Daughter    ,   Immunization History   Administered Date(s) Administered    Influenza, Quadv, IM, PF (6 mo and older Fluzone, Flulaval, Fluarix, and 3 yrs and older Afluria) 02/19/2019    Pneumococcal Polysaccharide ([id number]) 02/19/2019       PHYSICAL EXAMINATION:  [ INSTRUCTIONS:  \"[x]\" Indicates a positive item  \"[]\" Indicates a negative item  -- DELETE ALL ITEMS NOT EXAMINED]  Vital Signs: (As obtained by patient/caregiver or practitioner observation)    Blood pressure-  Heart rate-    Respiratory rate-    Temperature-  Pulse oximetry-     Constitutional: [x] Appears well-developed and well-nourished [] No apparent distress      [x] Abnormal- appears ill  Mental status  [x] Alert and awake  [x] Oriented to person/place/time [x]Able to follow commands      Eyes:  EOM    [x]  Normal  [] Abnormal-  Sclera  [x]  Normal  [] Abnormal -         Discharge [x]  None visible  [] Abnormal -    HENT:   [x] Normocephalic, atraumatic.   [] Abnormal   [] Mouth/Throat: Mucous membranes are moist.     External Ears [x] Normal  [] Abnormal-     Neck: [x] No visualized mass     Pulmonary/Chest: [x] Respiratory effort normal.  [x] No visualized signs of difficulty breathing or respiratory distress        [] Abnormal-      Musculoskeletal:   [] Normal gait with no signs of ataxia         [x] Normal range of motion of neck        [] Abnormal-       Neurological:        [x] No Facial Asymmetry (Cranial nerve 7 motor function) (limited exam to video visit)          [x] No gaze palsy        [] Abnormal-         Skin:        [x] No significant exanthematous lesions or discoloration noted on facial skin         [] Abnormal-            Psychiatric:       [x] Normal Affect [] No Hallucinations        [] Abnormal-     Other pertinent observable physical exam findings-     ASSESSMENT/PLAN:  1. COVID-19  Notify office if you have no improvement or worsening of condition. Instructed to hydrate 3 (16.9) oz water prior to noon and call back or message prior to noon today with BP and take BP every hour. Monitor O2 sat also and report to ED if <92%. Voiced understanding. Your recent COVID test was positive. Meets for Paxlovid. Meds reviewed. Stop Atorvastatin while on medication and restart on day 3 following completion of Paxlovid. Currently, the CDC recommends staying at home in self isolation for at least 5 days. After that, if you are without a fever and symptoms are improving, you may go out, but only if wearing a mask for an additional 5 days. Work noted provided. Thankfully, most people recover uneventfully. The mainstay of treatment for most people remains focused on symptom control, isolating and watching for signs of worsening illness. You should drink plenty of fluids, eat when you are able, and rest as much as possible. Please seek more urgent medical attention if you develop any of the following:  Chest pain  Shortness of breath  Bluish lips or face  Severe headache   Severe dizziness or passing out  New confusion  Inability to stay awake  Extreme weakness    If you have a pulse oximeter, check it at least daily. Seek urgent medical attention if it drops to 92% or below. - nirmatrelvir/ritonavir (PAXLOVID) 20 x 150 MG & 10 x 100MG; Take 3 tablets (two 150 mg nirmatrelvir and one 100 mg ritonavir tablets) by mouth every 12 hours for 5 days. Stop Atorvastatin while taking this medication can resume taking 3 days after last dose. Dispense: 30 tablet; Refill: 0    2. Cough  Notify office if you have no improvement or worsening of condition. Take meds as prescribed  Stay hydrated. See above plan.     - benzonatate (TESSALON) 100 MG capsule; Take 1-2 capsules by mouth 3 times daily as needed for Cough  Dispense: 40 capsule; Refill: 0    3. SOB (shortness of breath)  Notify office if you have no improvement or worsening of condition. Medications as prescribed. See above plan. - albuterol sulfate HFA (PROVENTIL HFA) 108 (90 Base) MCG/ACT inhaler; Inhale 2 puffs into the lungs every 4 hours as needed for Wheezing or Shortness of Breath  Dispense: 18 g; Refill: 3      No follow-ups on file. Bipin HUBERT Caballero, was evaluated through a synchronous (real-time) audio-video encounter. The patient (or guardian if applicable) is aware that this is a billable service, which includes applicable co-pays. This Virtual Visit was conducted with patient's (and/or legal guardian's) consent. The visit was conducted pursuant to the emergency declaration under the [address] [place] [address], [address] [place] [address] authority and the [employer] General Act. Patient identification was verified, and a caregiver was present when appropriate. The patient was located at Home: [address] [place] [address]. Provider was located at Other: Home:[place]. Total time spent on this encounter: 25 minutes    --KALEIGH Munoz CNP on 7/11/2022 at 9:18 AM    An electronic signature was used to authenticate this note.

## 2022-07-11 NOTE — TELEPHONE ENCOUNTER
Message/Telephone call regarding - New or worsening symptoms      Symptoms reported - Pulmonary / Respiratory / Ears, Nose, Throat- Concern for COVID - COVID Test - Yes  -  Date 07/10/22     Result  pos   Where did you test home, fever / chills, chest congestion / wheezing , cough - no sputum, nasal congestion / runny nose, headache and body aches / myalgias - ALERT - INDICATE RED VISIT IN APPT NOTES  Pain Scale - 5    Symptom Onset Date - 07/07/22  Onset - with a sudden onset    Aggravating factors - same all the time  Relieving actions and treatment(s) attempted - tyl. nasal decongestant     Last Appointment at [facility] - 6/15/2022  Next Appointment - [email or web address]      Is there any other information to share with PCP -  yes - schedules with virtualist today     REFRESH after scheduling appointment.     Future Appointments   Date Time Provider Elmer Calixto   7/11/2022  9:00 AM KALEIGH Frias CNP [facility]   9/15/2022 10:00 AM DO ADAM Lock

## 2022-07-11 NOTE — LETTER
[address] [facility] [address]  Phone: [phone]  Fax: [address], APRN - CNP        July 11, 2022     Patient: Matthew Chavez   YOB: 1955   Date of Visit: 7/11/2022       To Whom It May Concern: It is my medical opinion that Uriel Deras may return to work on July 14, 2022. If you have any questions or concerns, please don't hesitate to call.     Sincerely,          KALEIGH Johnson CNP

## 2022-07-11 NOTE — LETTER
I had the pleasure of seeing Fay Day today for a primary care virtualist video visit secondary to Adryan. I have provided the following recommendations: Paxlovid. I have included my note for your review and have asked the patient to follow up with you if no improvement or worsening of symptoms. If you have questions, please reach out via [employer] secure messaging by searching for the [facility] Primary Care Virtualists. Your communication will be answered promptly by the Virtualist on service for the day. Additionally, we would love your overall feedback on this visit. Please hit shift and click the following link to let us know if the Virtualist service met your expectations. [employer] [id number]      Electronically signed by KALEIGH Salazar CNP on 7/11/22 at 8:28 PM EDT.

## 2022-07-12 NOTE — PATIENT INSTRUCTIONS
Notify office if you have no improvement or worsening of condition. Instructed to hydrate 3 (16.9) oz water prior to noon and call back or message prior to noon today with BP and take BP every hour. Monitor O2 sat also and report to ED if <92%. Voiced understanding. Your recent COVID test was positive. Meets for Paxlovid. Meds reviewed. Stop Atorvastatin while on medication and restart on day 3 following completion of Paxlovid. Currently, the CDC recommends staying at home in self isolation for at least 5 days. After that, if you are without a fever and symptoms are improving, you may go out, but only if wearing a mask for an additional 5 days. Work noted provided. Thankfully, most people recover uneventfully. The mainstay of treatment for most people remains focused on symptom control, isolating and watching for signs of worsening illness. You should drink plenty of fluids, eat when you are able, and rest as much as possible. Please seek more urgent medical attention if you develop any of the following:  Chest pain  Shortness of breath  Bluish lips or face  Severe headache   Severe dizziness or passing out  New confusion  Inability to stay awake  Extreme weakness    If you have a pulse oximeter, check it at least daily. Seek urgent medical attention if it drops to 92% or below.

## 2022-10-11 ENCOUNTER — OFFICE VISIT (OUTPATIENT)
Dept: FAMILY MEDICINE CLINIC | Age: 67
End: 2022-10-11
Payer: COMMERCIAL

## 2022-10-11 VITALS
WEIGHT: 209 LBS | DIASTOLIC BLOOD PRESSURE: 70 MMHG | BODY MASS INDEX: 28.74 KG/M2 | OXYGEN SATURATION: 97 % | HEART RATE: 63 BPM | SYSTOLIC BLOOD PRESSURE: 144 MMHG

## 2022-10-11 DIAGNOSIS — Z12.11 SCREEN FOR COLON CANCER: ICD-10-CM

## 2022-10-11 DIAGNOSIS — E11.59 HYPERTENSION ASSOCIATED WITH DIABETES (HCC): ICD-10-CM

## 2022-10-11 DIAGNOSIS — E11.65 TYPE 2 DIABETES MELLITUS WITH HYPERGLYCEMIA, WITHOUT LONG-TERM CURRENT USE OF INSULIN (HCC): Primary | ICD-10-CM

## 2022-10-11 DIAGNOSIS — I15.2 HYPERTENSION ASSOCIATED WITH DIABETES (HCC): ICD-10-CM

## 2022-10-11 LAB — HBA1C MFR BLD: 11.5 %

## 2022-10-11 PROCEDURE — 3046F HEMOGLOBIN A1C LEVEL >9.0%: CPT | Performed by: STUDENT IN AN ORGANIZED HEALTH CARE EDUCATION/TRAINING PROGRAM

## 2022-10-11 PROCEDURE — 1123F ACP DISCUSS/DSCN MKR DOCD: CPT | Performed by: STUDENT IN AN ORGANIZED HEALTH CARE EDUCATION/TRAINING PROGRAM

## 2022-10-11 PROCEDURE — 99214 OFFICE O/P EST MOD 30 MIN: CPT | Performed by: STUDENT IN AN ORGANIZED HEALTH CARE EDUCATION/TRAINING PROGRAM

## 2022-10-11 PROCEDURE — 83036 HEMOGLOBIN GLYCOSYLATED A1C: CPT | Performed by: STUDENT IN AN ORGANIZED HEALTH CARE EDUCATION/TRAINING PROGRAM

## 2022-10-11 RX ORDER — TIRZEPATIDE 5 MG/.5ML
5 INJECTION, SOLUTION SUBCUTANEOUS WEEKLY
Qty: 4 ADJUSTABLE DOSE PRE-FILLED PEN SYRINGE | Refills: 0 | Status: ON HOLD | OUTPATIENT
Start: 2022-10-11 | End: 2022-11-03 | Stop reason: HOSPADM

## 2022-10-11 RX ORDER — LANCETS 30 GAUGE
1 EACH MISCELLANEOUS DAILY
Qty: 100 EACH | Refills: 5 | Status: SHIPPED | OUTPATIENT
Start: 2022-10-11

## 2022-10-11 RX ORDER — TIRZEPATIDE 7.5 MG/.5ML
7.5 INJECTION, SOLUTION SUBCUTANEOUS WEEKLY
Qty: 4 ADJUSTABLE DOSE PRE-FILLED PEN SYRINGE | Refills: 0 | Status: ON HOLD | OUTPATIENT
Start: 2022-10-11 | End: 2022-11-03 | Stop reason: HOSPADM

## 2022-10-11 RX ORDER — GLUCOSAMINE HCL/CHONDROITIN SU 500-400 MG
CAPSULE ORAL
Qty: 100 STRIP | Refills: 1 | Status: SHIPPED | OUTPATIENT
Start: 2022-10-11

## 2022-10-11 RX ORDER — BLOOD-GLUCOSE METER
1 KIT MISCELLANEOUS DAILY
Qty: 1 KIT | Refills: 0 | Status: SHIPPED | OUTPATIENT
Start: 2022-10-11

## 2022-10-11 ASSESSMENT — PATIENT HEALTH QUESTIONNAIRE - PHQ9
SUM OF ALL RESPONSES TO PHQ QUESTIONS 1-9: 2
SUM OF ALL RESPONSES TO PHQ9 QUESTIONS 1 & 2: 2
SUM OF ALL RESPONSES TO PHQ QUESTIONS 1-9: 2
1. LITTLE INTEREST OR PLEASURE IN DOING THINGS: 1
SUM OF ALL RESPONSES TO PHQ QUESTIONS 1-9: 2
SUM OF ALL RESPONSES TO PHQ QUESTIONS 1-9: 2
2. FEELING DOWN, DEPRESSED OR HOPELESS: 1

## 2022-10-11 NOTE — PROGRESS NOTES
Patient: Shahana Oliva is a 74 y.o. male who presents today with the following Chief Complaint(s):  Chief Complaint   Patient presents with    Diabetes         HPI    Metformin  1000mg BID  Glipizide 10mg BID  Bydureon weekly - Has not been taking medication for last 3 months due to cost  Jardiance 25mg  Lisinopril 30mg  Lipitor 20mg  A1c 9.1 --> 11.5 today    HTN  Checks at home regularly  694'W systolic with 46AF daily    Current Outpatient Medications   Medication Sig Dispense Refill    Tirzepatide (MOUNJARO) 5 MG/0.5ML SOPN SC injection Inject 0.5 mLs into the skin once a week 4 Adjustable Dose Pre-filled Pen Syringe 0    Tirzepatide (MOUNJARO) 7.5 MG/0.5ML SOPN SC injection Inject 0.5 mLs into the skin once a week 4 Adjustable Dose Pre-filled Pen Syringe 0    glucose monitoring (FREESTYLE FREEDOM) kit 1 kit by Does not apply route daily 1 kit 0    Lancets MISC 1 each by Does not apply route daily 100 each 5    blood glucose monitor strips Test 1 times a day & as needed for symptoms of irregular blood glucose. Dispense sufficient amount for indicated testing frequency plus additional to accommodate PRN testing needs. 100 strip 1    albuterol sulfate HFA (PROVENTIL HFA) 108 (90 Base) MCG/ACT inhaler Inhale 2 puffs into the lungs every 4 hours as needed for Wheezing or Shortness of Breath 18 g 3    empagliflozin (JARDIANCE) 25 MG tablet Take 1 tablet by mouth daily 90 tablet 1    lisinopril (PRINIVIL;ZESTRIL) 30 MG tablet Take 1 tablet by mouth daily 90 tablet 1    glipiZIDE (GLUCOTROL) 10 MG tablet Take 1 tablet by mouth 2 times daily (before meals) 180 tablet 1    metFORMIN (GLUCOPHAGE) 1000 MG tablet Take 1 tablet by mouth 2 times daily (with meals) 180 tablet 1    atorvastatin (LIPITOR) 20 MG tablet Take 1 tablet by mouth daily 90 tablet 1    Omega-3 Fatty Acids (FISH OIL) 1000 MG CAPS Take 3,000 mg by mouth 3 times daily. aspirin 81 MG EC tablet Take 81 mg by mouth daily.         CINNAMON PO Take 1,000 mg by mouth 2 times daily. Multiple Vitamin (MULTI-VITAMIN PO) Take  by mouth. No current facility-administered medications for this visit. Patient's past medical history, surgical history, family history, medications,  andallergies  were all reviewed and updated as appropriate today. Review of Systems  All other systems reviewed and negative    Physical Exam  Vitals reviewed. Constitutional:       Appearance: Normal appearance. HENT:      Head: Normocephalic and atraumatic. Cardiovascular:      Rate and Rhythm: Normal rate and regular rhythm. Pulmonary:      Effort: Pulmonary effort is normal.      Breath sounds: Normal breath sounds. Neurological:      General: No focal deficit present. Mental Status: He is alert and oriented to person, place, and time. Psychiatric:         Behavior: Behavior normal.         Thought Content: Thought content normal.     Vitals:    10/11/22 1101   BP: (!) 144/70   Pulse: 63   SpO2: 97%       Assessment:  Encounter Diagnoses   Name Primary? Type 2 diabetes mellitus with hyperglycemia, without long-term current use of insulin ([facility]) Yes    Screen for colon cancer     Hypertension associated with diabetes ([facility]        Plan:  1. Type 2 diabetes mellitus with hyperglycemia, without long-term current use of insulin [facility])  Patient with difficulty affording bydurian. Will change to [facility] for affordability. Will send a message in two months to state tolerance and further increase medication if tolerated. Discussed risks of prolonged hyperglycemia especially over 10 A1c. Patient still wishes to avoid insulin at this time and will modify diet. - POCT glycosylated hemoglobin (Hb A1C)  - Tirzepatide (MOUNJARO) 5 MG/0.5ML SOPN SC injection; Inject 0.5 mLs into the skin once a week  Dispense: 4 Adjustable Dose Pre-filled Pen Syringe; Refill: 0  - Tirzepatide (MOUNJARO) 7.5 MG/0.5ML SOPN SC injection;  Inject 0.5 mLs into the skin once a week Dispense: 4 Adjustable Dose Pre-filled Pen Syringe; Refill: 0  - glucose monitoring (FREESTYLE FREEDOM) kit; 1 kit by Does not apply route daily  Dispense: 1 kit; Refill: 0  - Lancets MISC; 1 each by Does not apply route daily  Dispense: 100 each; Refill: 5  - blood glucose monitor strips; Test 1 times a day & as needed for symptoms of irregular blood glucose. Dispense sufficient amount for indicated testing frequency plus additional to accommodate PRN testing needs. Dispense: 100 strip; Refill: 1    2. Screen for colon cancer  - [facility] Martinez MD, Gastroenterology, [facility]    3. Hypertension associated with diabetes ([facility]  Will continue to monitor home BP. If elevated will increase lisinopril. Return in about 3 months (around 1/11/2023) for diabetes.

## 2022-10-13 ENCOUNTER — TELEPHONE (OUTPATIENT)
Dept: FAMILY MEDICINE CLINIC | Age: 67
End: 2022-10-13

## 2022-10-13 NOTE — TELEPHONE ENCOUNTER
PRIOR AUTH NEEDED FOR Mounjaro 7.5mg/0.5mL sopn SC injection     LAST NAME: Federico   : [birth year]  Dx: E11.65

## 2022-10-14 NOTE — TELEPHONE ENCOUNTER
I am not showing any prescription benefits on file for patient. Please reach out to patient for NEW PRESCRIPTION coverage.      Thank you

## 2022-10-14 NOTE — TELEPHONE ENCOUNTER
LVM for patient asking for call back with prescription insurance coverage information to update/verify so that we can initiate a request for prescription insurance to approve coverage of Mounjaro.

## 2022-10-17 NOTE — TELEPHONE ENCOUNTER
Patient returned call, for Pharmacy RX they use  [facility]  Member ID [id number]  Pharmacy group# [facility] [id number]  [facility]   Submit claims to  [address].   [address]  Pharmacy Help Desk number  [phone]  They can only do 30 day supply, anything else over that they must use [facility]

## 2022-10-17 NOTE — TELEPHONE ENCOUNTER
Patient states all he knows he has is ALEXIS Ojeda. He will call insurance to find out more information.

## 2022-10-27 ENCOUNTER — TELEPHONE (OUTPATIENT)
Dept: FAMILY MEDICINE CLINIC | Age: 67
End: 2022-10-27

## 2022-10-27 NOTE — TELEPHONE ENCOUNTER
Patient called back referring to prior call on 10/13/2022. He said that the insurance still hasnt received a prior Authorization to fill the medication and just wanted to let you know he hasn't been able to take the medication because he hasn't even been able to pick it up. Do we have an idea on when the pre-auth, would be approved? Please advise!

## 2022-11-01 ENCOUNTER — APPOINTMENT (OUTPATIENT)
Dept: GENERAL RADIOLOGY | Age: 67
DRG: 638 | End: 2022-11-01
Payer: COMMERCIAL

## 2022-11-01 ENCOUNTER — HOSPITAL ENCOUNTER (INPATIENT)
Age: 67
LOS: 2 days | Discharge: HOME | DRG: 638 | End: 2022-11-03
Attending: EMERGENCY MEDICINE | Admitting: INTERNAL MEDICINE
Payer: COMMERCIAL

## 2022-11-01 ENCOUNTER — APPOINTMENT (OUTPATIENT)
Dept: CT IMAGING | Age: 67
DRG: 638 | End: 2022-11-01
Payer: COMMERCIAL

## 2022-11-01 DIAGNOSIS — N17.9 AKI (ACUTE KIDNEY INJURY) (HCC): ICD-10-CM

## 2022-11-01 DIAGNOSIS — E08.10 DIABETIC KETOACIDOSIS WITHOUT COMA ASSOCIATED WITH DIABETES MELLITUS DUE TO UNDERLYING CONDITION (HCC): Primary | ICD-10-CM

## 2022-11-01 DIAGNOSIS — E86.0 DEHYDRATION: ICD-10-CM

## 2022-11-01 PROBLEM — K08.409: Status: ACTIVE | Noted: 2022-11-01

## 2022-11-01 PROBLEM — E11.10 DKA, TYPE 2, NOT AT GOAL (HCC): Status: ACTIVE | Noted: 2022-11-01

## 2022-11-01 PROBLEM — E87.29 HIGH ANION GAP METABOLIC ACIDOSIS: Status: ACTIVE | Noted: 2022-11-01

## 2022-11-01 LAB
A/G RATIO: 1.2 (ref 1.1–2.2)
ALBUMIN SERPL-MCNC: 4.8 G/DL (ref 3.4–5)
ALP BLD-CCNC: 128 U/L (ref 40–129)
ALT SERPL-CCNC: 11 U/L (ref 10–40)
ANION GAP SERPL CALCULATED.3IONS-SCNC: 17 MMOL/L (ref 3–16)
ANION GAP SERPL CALCULATED.3IONS-SCNC: 17 MMOL/L (ref 3–16)
ANION GAP SERPL CALCULATED.3IONS-SCNC: 19 MMOL/L (ref 3–16)
ANION GAP SERPL CALCULATED.3IONS-SCNC: 20 MMOL/L (ref 3–16)
ANION GAP SERPL CALCULATED.3IONS-SCNC: 34 MMOL/L (ref 3–16)
ANION GAP SERPL CALCULATED.3IONS-SCNC: 42 MMOL/L (ref 3–16)
APTT: 28.4 SEC (ref 23–34.3)
AST SERPL-CCNC: 8 U/L (ref 15–37)
BANDED NEUTROPHILS RELATIVE PERCENT: 5 % (ref 0–7)
BASE EXCESS VENOUS: -23.5 MMOL/L (ref -3–3)
BASOPHILS ABSOLUTE: 0 K/UL (ref 0–0.2)
BASOPHILS RELATIVE PERCENT: 0 %
BETA-HYDROXYBUTYRATE: >8 MMOL/L (ref 0–0.27)
BILIRUB SERPL-MCNC: 0.4 MG/DL (ref 0–1)
BILIRUBIN URINE: ABNORMAL
BLOOD, URINE: NEGATIVE
BUN BLDV-MCNC: 23 MG/DL (ref 7–20)
BUN BLDV-MCNC: 28 MG/DL (ref 7–20)
BUN BLDV-MCNC: 38 MG/DL (ref 7–20)
BUN BLDV-MCNC: 42 MG/DL (ref 7–20)
BUN BLDV-MCNC: 50 MG/DL (ref 7–20)
BUN BLDV-MCNC: 53 MG/DL (ref 7–20)
CALCIUM SERPL-MCNC: 10.2 MG/DL (ref 8.3–10.6)
CALCIUM SERPL-MCNC: 8.2 MG/DL (ref 8.3–10.6)
CALCIUM SERPL-MCNC: 8.4 MG/DL (ref 8.3–10.6)
CALCIUM SERPL-MCNC: 8.6 MG/DL (ref 8.3–10.6)
CALCIUM SERPL-MCNC: 8.8 MG/DL (ref 8.3–10.6)
CALCIUM SERPL-MCNC: 8.9 MG/DL (ref 8.3–10.6)
CARBOXYHEMOGLOBIN: 1.3 % (ref 0–1.5)
CHLORIDE BLD-SCNC: 104 MMOL/L (ref 99–110)
CHLORIDE BLD-SCNC: 105 MMOL/L (ref 99–110)
CHLORIDE BLD-SCNC: 105 MMOL/L (ref 99–110)
CHLORIDE BLD-SCNC: 106 MMOL/L (ref 99–110)
CHLORIDE BLD-SCNC: 86 MMOL/L (ref 99–110)
CHLORIDE BLD-SCNC: 97 MMOL/L (ref 99–110)
CLARITY: CLEAR
CO2: 12 MMOL/L (ref 21–32)
CO2: 13 MMOL/L (ref 21–32)
CO2: 14 MMOL/L (ref 21–32)
CO2: 15 MMOL/L (ref 21–32)
CO2: 5 MMOL/L (ref 21–32)
CO2: 6 MMOL/L (ref 21–32)
COLOR: YELLOW
CREAT SERPL-MCNC: 0.8 MG/DL (ref 0.8–1.3)
CREAT SERPL-MCNC: 0.9 MG/DL (ref 0.8–1.3)
CREAT SERPL-MCNC: 0.9 MG/DL (ref 0.8–1.3)
CREAT SERPL-MCNC: 1 MG/DL (ref 0.8–1.3)
CREAT SERPL-MCNC: 1.3 MG/DL (ref 0.8–1.3)
CREAT SERPL-MCNC: 1.4 MG/DL (ref 0.8–1.3)
EKG ATRIAL RATE: 117 BPM
EKG DIAGNOSIS: NORMAL
EKG P AXIS: 57 DEGREES
EKG P-R INTERVAL: 162 MS
EKG Q-T INTERVAL: 342 MS
EKG QRS DURATION: 96 MS
EKG QTC CALCULATION (BAZETT): 477 MS
EKG R AXIS: 73 DEGREES
EKG T AXIS: 59 DEGREES
EKG VENTRICULAR RATE: 117 BPM
EOSINOPHILS ABSOLUTE: 0 K/UL (ref 0–0.6)
EOSINOPHILS RELATIVE PERCENT: 0 %
EPITHELIAL CELLS, UA: NORMAL /HPF (ref 0–5)
GFR SERPL CREATININE-BSD FRML MDRD: 56 ML/MIN/{1.73_M2}
GFR SERPL CREATININE-BSD FRML MDRD: >60 ML/MIN/{1.73_M2}
GLUCOSE BLD-MCNC: 105 MG/DL (ref 70–99)
GLUCOSE BLD-MCNC: 108 MG/DL (ref 70–99)
GLUCOSE BLD-MCNC: 115 MG/DL (ref 70–99)
GLUCOSE BLD-MCNC: 119 MG/DL (ref 70–99)
GLUCOSE BLD-MCNC: 121 MG/DL (ref 70–99)
GLUCOSE BLD-MCNC: 129 MG/DL (ref 70–99)
GLUCOSE BLD-MCNC: 129 MG/DL (ref 70–99)
GLUCOSE BLD-MCNC: 139 MG/DL (ref 70–99)
GLUCOSE BLD-MCNC: 150 MG/DL (ref 70–99)
GLUCOSE BLD-MCNC: 152 MG/DL (ref 70–99)
GLUCOSE BLD-MCNC: 157 MG/DL (ref 70–99)
GLUCOSE BLD-MCNC: 170 MG/DL (ref 70–99)
GLUCOSE BLD-MCNC: 191 MG/DL (ref 70–99)
GLUCOSE BLD-MCNC: 192 MG/DL (ref 70–99)
GLUCOSE BLD-MCNC: 217 MG/DL (ref 70–99)
GLUCOSE BLD-MCNC: 224 MG/DL (ref 70–99)
GLUCOSE BLD-MCNC: 230 MG/DL (ref 70–99)
GLUCOSE BLD-MCNC: 250 MG/DL (ref 70–99)
GLUCOSE BLD-MCNC: 274 MG/DL (ref 70–99)
GLUCOSE BLD-MCNC: 290 MG/DL (ref 70–99)
GLUCOSE BLD-MCNC: 374 MG/DL (ref 70–99)
GLUCOSE BLD-MCNC: 380 MG/DL (ref 70–99)
GLUCOSE BLD-MCNC: 389 MG/DL (ref 70–99)
GLUCOSE BLD-MCNC: 397 MG/DL (ref 70–99)
GLUCOSE BLD-MCNC: 434 MG/DL (ref 70–99)
GLUCOSE BLD-MCNC: 91 MG/DL (ref 70–99)
GLUCOSE BLD-MCNC: 93 MG/DL (ref 70–99)
GLUCOSE BLD-MCNC: 95 MG/DL (ref 70–99)
GLUCOSE BLD-MCNC: 96 MG/DL (ref 70–99)
GLUCOSE BLD-MCNC: 98 MG/DL (ref 70–99)
GLUCOSE URINE: >=1000 MG/DL
HCO3 VENOUS: 5 MMOL/L (ref 23–29)
HCT VFR BLD CALC: 55.1 % (ref 40.5–52.5)
HEMOGLOBIN: 17.5 G/DL (ref 13.5–17.5)
INFLUENZA A: NOT DETECTED
INFLUENZA B: NOT DETECTED
INR BLD: 1.13 (ref 0.87–1.14)
KETONES, URINE: >=80 MG/DL
LACTIC ACID: 1.3 MMOL/L (ref 0.4–2)
LACTIC ACID: 3.8 MMOL/L (ref 0.4–2)
LACTIC ACID: NORMAL MMOL/L (ref 0.4–2)
LEUKOCYTE ESTERASE, URINE: NEGATIVE
LIPASE: 15 U/L (ref 13–60)
LYMPHOCYTES ABSOLUTE: 0.4 K/UL (ref 1–5.1)
LYMPHOCYTES RELATIVE PERCENT: 4 %
MAGNESIUM: 2 MG/DL (ref 1.8–2.4)
MAGNESIUM: 2 MG/DL (ref 1.8–2.4)
MAGNESIUM: 2.2 MG/DL (ref 1.8–2.4)
MAGNESIUM: <0.2 MG/DL (ref 1.8–2.4)
MCH RBC QN AUTO: 29.2 PG (ref 26–34)
MCHC RBC AUTO-ENTMCNC: 31.8 G/DL (ref 31–36)
MCV RBC AUTO: 91.8 FL (ref 80–100)
METHEMOGLOBIN VENOUS: 0.6 %
MICROSCOPIC EXAMINATION: YES
MONOCYTES ABSOLUTE: 0.3 K/UL (ref 0–1.3)
MONOCYTES RELATIVE PERCENT: 3 %
NEUTROPHILS ABSOLUTE: 10.3 K/UL (ref 1.7–7.7)
NEUTROPHILS RELATIVE PERCENT: 88 %
NITRITE, URINE: NEGATIVE
O2 SAT, VEN: 96 %
O2 THERAPY: ABNORMAL
PCO2, VEN: 18.4 MMHG (ref 40–50)
PDW BLD-RTO: 13.1 % (ref 12.4–15.4)
PERFORMED ON: ABNORMAL
PERFORMED ON: NORMAL
PH UA: 6 (ref 5–8)
PH VENOUS: 7.05 (ref 7.35–7.45)
PHOSPHORUS: 1.4 MG/DL (ref 2.5–4.9)
PHOSPHORUS: 1.9 MG/DL (ref 2.5–4.9)
PHOSPHORUS: 2 MG/DL (ref 2.5–4.9)
PHOSPHORUS: 2.3 MG/DL (ref 2.5–4.9)
PHOSPHORUS: 2.9 MG/DL (ref 2.5–4.9)
PHOSPHORUS: <0.4 MG/DL (ref 2.5–4.9)
PLATELET # BLD: 412 K/UL (ref 135–450)
PLATELET SLIDE REVIEW: ADEQUATE
PMV BLD AUTO: 8.5 FL (ref 5–10.5)
PO2, VEN: 98.5 MMHG (ref 25–40)
POTASSIUM SERPL-SCNC: 4 MMOL/L (ref 3.5–5.1)
POTASSIUM SERPL-SCNC: 4.2 MMOL/L (ref 3.5–5.1)
POTASSIUM SERPL-SCNC: 4.4 MMOL/L (ref 3.5–5.1)
POTASSIUM SERPL-SCNC: 4.4 MMOL/L (ref 3.5–5.1)
POTASSIUM SERPL-SCNC: 4.5 MMOL/L (ref 3.5–5.1)
POTASSIUM SERPL-SCNC: 4.5 MMOL/L (ref 3.5–5.1)
PRO-BNP: 100 PG/ML (ref 0–124)
PROTEIN UA: ABNORMAL MG/DL
PROTHROMBIN TIME: 14.4 SEC (ref 11.7–14.5)
RBC # BLD: 6 M/UL (ref 4.2–5.9)
RBC # BLD: NORMAL 10*6/UL
RBC UA: NORMAL /HPF (ref 0–4)
SARS-COV-2 RNA, RT PCR: NOT DETECTED
SODIUM BLD-SCNC: 133 MMOL/L (ref 136–145)
SODIUM BLD-SCNC: 136 MMOL/L (ref 136–145)
SODIUM BLD-SCNC: 137 MMOL/L (ref 136–145)
SPECIFIC GRAVITY UA: >=1.03 (ref 1–1.03)
TCO2 CALC VENOUS: 6 MMOL/L
TOTAL PROTEIN: 8.8 G/DL (ref 6.4–8.2)
TROPONIN: <0.01 NG/ML
URINE REFLEX TO CULTURE: ABNORMAL
URINE TYPE: ABNORMAL
UROBILINOGEN, URINE: 0.2 E.U./DL
WBC # BLD: 11.1 K/UL (ref 4–11)
WBC UA: NORMAL /HPF (ref 0–5)

## 2022-11-01 PROCEDURE — 6370000000 HC RX 637 (ALT 250 FOR IP): Performed by: INTERNAL MEDICINE

## 2022-11-01 PROCEDURE — 81001 URINALYSIS AUTO W/SCOPE: CPT

## 2022-11-01 PROCEDURE — 2000000000 HC ICU R&B

## 2022-11-01 PROCEDURE — 84100 ASSAY OF PHOSPHORUS: CPT

## 2022-11-01 PROCEDURE — 80053 COMPREHEN METABOLIC PANEL: CPT

## 2022-11-01 PROCEDURE — 2580000003 HC RX 258: Performed by: EMERGENCY MEDICINE

## 2022-11-01 PROCEDURE — 36415 COLL VENOUS BLD VENIPUNCTURE: CPT

## 2022-11-01 PROCEDURE — 83605 ASSAY OF LACTIC ACID: CPT

## 2022-11-01 PROCEDURE — 87636 SARSCOV2 & INF A&B AMP PRB: CPT

## 2022-11-01 PROCEDURE — 87040 BLOOD CULTURE FOR BACTERIA: CPT

## 2022-11-01 PROCEDURE — 71045 X-RAY EXAM CHEST 1 VIEW: CPT

## 2022-11-01 PROCEDURE — 93010 ELECTROCARDIOGRAM REPORT: CPT | Performed by: INTERNAL MEDICINE

## 2022-11-01 PROCEDURE — 85730 THROMBOPLASTIN TIME PARTIAL: CPT

## 2022-11-01 PROCEDURE — 6370000000 HC RX 637 (ALT 250 FOR IP): Performed by: EMERGENCY MEDICINE

## 2022-11-01 PROCEDURE — 82803 BLOOD GASES ANY COMBINATION: CPT

## 2022-11-01 PROCEDURE — 93005 ELECTROCARDIOGRAM TRACING: CPT | Performed by: EMERGENCY MEDICINE

## 2022-11-01 PROCEDURE — 6360000002 HC RX W HCPCS: Performed by: EMERGENCY MEDICINE

## 2022-11-01 PROCEDURE — 70450 CT HEAD/BRAIN W/O DYE: CPT

## 2022-11-01 PROCEDURE — 51702 INSERT TEMP BLADDER CATH: CPT

## 2022-11-01 PROCEDURE — 82010 KETONE BODYS QUAN: CPT

## 2022-11-01 PROCEDURE — 83735 ASSAY OF MAGNESIUM: CPT

## 2022-11-01 PROCEDURE — 84484 ASSAY OF TROPONIN QUANT: CPT

## 2022-11-01 PROCEDURE — 70486 CT MAXILLOFACIAL W/O DYE: CPT

## 2022-11-01 PROCEDURE — 6360000002 HC RX W HCPCS: Performed by: INTERNAL MEDICINE

## 2022-11-01 PROCEDURE — 2500000003 HC RX 250 WO HCPCS: Performed by: EMERGENCY MEDICINE

## 2022-11-01 PROCEDURE — 99285 EMERGENCY DEPT VISIT HI MDM: CPT

## 2022-11-01 PROCEDURE — 87150 DNA/RNA AMPLIFIED PROBE: CPT

## 2022-11-01 PROCEDURE — 83690 ASSAY OF LIPASE: CPT

## 2022-11-01 PROCEDURE — 85025 COMPLETE CBC W/AUTO DIFF WBC: CPT

## 2022-11-01 PROCEDURE — 2580000003 HC RX 258: Performed by: INTERNAL MEDICINE

## 2022-11-01 PROCEDURE — 85610 PROTHROMBIN TIME: CPT

## 2022-11-01 PROCEDURE — 83880 ASSAY OF NATRIURETIC PEPTIDE: CPT

## 2022-11-01 RX ORDER — MAGNESIUM SULFATE 1 G/100ML
1000 INJECTION INTRAVENOUS PRN
Status: DISCONTINUED | OUTPATIENT
Start: 2022-11-01 | End: 2022-11-01

## 2022-11-01 RX ORDER — LACTOBACILLUS RHAMNOSUS GG 10B CELL
1 CAPSULE ORAL 2 TIMES DAILY WITH MEALS
Status: DISCONTINUED | OUTPATIENT
Start: 2022-11-01 | End: 2022-11-03 | Stop reason: HOSPADM

## 2022-11-01 RX ORDER — 0.9 % SODIUM CHLORIDE 0.9 %
1000 INTRAVENOUS SOLUTION INTRAVENOUS ONCE
Status: COMPLETED | OUTPATIENT
Start: 2022-11-01 | End: 2022-11-01

## 2022-11-01 RX ORDER — ALBUTEROL SULFATE 90 UG/1
2 AEROSOL, METERED RESPIRATORY (INHALATION) EVERY 4 HOURS PRN
Status: DISCONTINUED | OUTPATIENT
Start: 2022-11-01 | End: 2022-11-03 | Stop reason: HOSPADM

## 2022-11-01 RX ORDER — HYDRALAZINE HYDROCHLORIDE 20 MG/ML
10 INJECTION INTRAMUSCULAR; INTRAVENOUS EVERY 4 HOURS PRN
Status: DISCONTINUED | OUTPATIENT
Start: 2022-11-01 | End: 2022-11-03 | Stop reason: HOSPADM

## 2022-11-01 RX ORDER — CHLORHEXIDINE GLUCONATE 0.12 MG/ML
15 RINSE ORAL 2 TIMES DAILY
Status: DISCONTINUED | OUTPATIENT
Start: 2022-11-01 | End: 2022-11-03 | Stop reason: HOSPADM

## 2022-11-01 RX ORDER — DEXTROSE AND SODIUM CHLORIDE 5; .45 G/100ML; G/100ML
INJECTION, SOLUTION INTRAVENOUS CONTINUOUS PRN
Status: DISCONTINUED | OUTPATIENT
Start: 2022-11-01 | End: 2022-11-01 | Stop reason: SDUPTHER

## 2022-11-01 RX ORDER — 0.9 % SODIUM CHLORIDE 0.9 %
15 INTRAVENOUS SOLUTION INTRAVENOUS ONCE
Status: DISCONTINUED | OUTPATIENT
Start: 2022-11-01 | End: 2022-11-01

## 2022-11-01 RX ORDER — ENOXAPARIN SODIUM 100 MG/ML
40 INJECTION SUBCUTANEOUS DAILY
Status: DISCONTINUED | OUTPATIENT
Start: 2022-11-01 | End: 2022-11-03 | Stop reason: HOSPADM

## 2022-11-01 RX ORDER — POLYETHYLENE GLYCOL 3350 17 G/17G
17 POWDER, FOR SOLUTION ORAL DAILY PRN
Status: DISCONTINUED | OUTPATIENT
Start: 2022-11-01 | End: 2022-11-03 | Stop reason: HOSPADM

## 2022-11-01 RX ORDER — MAGNESIUM SULFATE 1 G/100ML
1000 INJECTION INTRAVENOUS PRN
Status: DISCONTINUED | OUTPATIENT
Start: 2022-11-01 | End: 2022-11-02

## 2022-11-01 RX ORDER — SODIUM CHLORIDE 9 MG/ML
INJECTION, SOLUTION INTRAVENOUS CONTINUOUS
Status: DISCONTINUED | OUTPATIENT
Start: 2022-11-01 | End: 2022-11-01

## 2022-11-01 RX ORDER — ONDANSETRON 2 MG/ML
4 INJECTION INTRAMUSCULAR; INTRAVENOUS EVERY 6 HOURS PRN
Status: DISCONTINUED | OUTPATIENT
Start: 2022-11-01 | End: 2022-11-03 | Stop reason: HOSPADM

## 2022-11-01 RX ORDER — SODIUM CHLORIDE 9 MG/ML
INJECTION, SOLUTION INTRAVENOUS CONTINUOUS
Status: DISCONTINUED | OUTPATIENT
Start: 2022-11-01 | End: 2022-11-02

## 2022-11-01 RX ORDER — DEXTROSE AND SODIUM CHLORIDE 5; .45 G/100ML; G/100ML
INJECTION, SOLUTION INTRAVENOUS CONTINUOUS PRN
Status: DISCONTINUED | OUTPATIENT
Start: 2022-11-01 | End: 2022-11-03 | Stop reason: HOSPADM

## 2022-11-01 RX ORDER — POTASSIUM CHLORIDE 7.45 MG/ML
10 INJECTION INTRAVENOUS PRN
Status: DISCONTINUED | OUTPATIENT
Start: 2022-11-01 | End: 2022-11-01

## 2022-11-01 RX ORDER — POTASSIUM CHLORIDE 7.45 MG/ML
10 INJECTION INTRAVENOUS PRN
Status: DISCONTINUED | OUTPATIENT
Start: 2022-11-01 | End: 2022-11-02

## 2022-11-01 RX ORDER — ASPIRIN 81 MG/1
324 TABLET, CHEWABLE ORAL ONCE
Status: DISCONTINUED | OUTPATIENT
Start: 2022-11-01 | End: 2022-11-01

## 2022-11-01 RX ORDER — DEXTROSE AND SODIUM CHLORIDE 5; .45 G/100ML; G/100ML
INJECTION, SOLUTION INTRAVENOUS CONTINUOUS PRN
Status: DISCONTINUED | OUTPATIENT
Start: 2022-11-01 | End: 2022-11-01

## 2022-11-01 RX ORDER — SODIUM CHLORIDE 450 MG/100ML
INJECTION, SOLUTION INTRAVENOUS CONTINUOUS
Status: DISCONTINUED | OUTPATIENT
Start: 2022-11-01 | End: 2022-11-01 | Stop reason: SDUPTHER

## 2022-11-01 RX ORDER — ATORVASTATIN CALCIUM 10 MG/1
20 TABLET, FILM COATED ORAL DAILY
Status: DISCONTINUED | OUTPATIENT
Start: 2022-11-01 | End: 2022-11-03 | Stop reason: HOSPADM

## 2022-11-01 RX ORDER — ASPIRIN 325 MG
325 TABLET ORAL ONCE
Status: COMPLETED | OUTPATIENT
Start: 2022-11-01 | End: 2022-11-01

## 2022-11-01 RX ORDER — ASPIRIN 81 MG/1
81 TABLET ORAL DAILY
Status: DISCONTINUED | OUTPATIENT
Start: 2022-11-01 | End: 2022-11-03 | Stop reason: HOSPADM

## 2022-11-01 RX ADMIN — POTASSIUM CHLORIDE 10 MEQ: 7.46 INJECTION, SOLUTION INTRAVENOUS at 15:05

## 2022-11-01 RX ADMIN — SODIUM CHLORIDE 0.01 UNITS/KG/HR: 9 INJECTION, SOLUTION INTRAVENOUS at 03:41

## 2022-11-01 RX ADMIN — POTASSIUM CHLORIDE 10 MEQ: 7.46 INJECTION, SOLUTION INTRAVENOUS at 10:07

## 2022-11-01 RX ADMIN — PIPERACILLIN AND TAZOBACTAM 4500 MG: 4; .5 INJECTION, POWDER, FOR SOLUTION INTRAVENOUS at 03:45

## 2022-11-01 RX ADMIN — Medication 1 CAPSULE: at 18:04

## 2022-11-01 RX ADMIN — SODIUM CHLORIDE 1000 ML: 9 INJECTION, SOLUTION INTRAVENOUS at 04:46

## 2022-11-01 RX ADMIN — AMPICILLIN SODIUM AND SULBACTAM SODIUM 3000 MG: 2; 1 INJECTION, POWDER, FOR SOLUTION INTRAMUSCULAR; INTRAVENOUS at 09:25

## 2022-11-01 RX ADMIN — ASPIRIN 81 MG: 81 TABLET, COATED ORAL at 09:07

## 2022-11-01 RX ADMIN — POTASSIUM CHLORIDE 10 MEQ: 7.46 INJECTION, SOLUTION INTRAVENOUS at 20:24

## 2022-11-01 RX ADMIN — SODIUM CHLORIDE 0.1 UNITS/KG/HR: 9 INJECTION, SOLUTION INTRAVENOUS at 04:33

## 2022-11-01 RX ADMIN — ASPIRIN 325 MG: 325 TABLET ORAL at 03:33

## 2022-11-01 RX ADMIN — INSULIN HUMAN 9 UNITS: 100 INJECTION, SOLUTION PARENTERAL at 04:50

## 2022-11-01 RX ADMIN — DEXTROSE AND SODIUM CHLORIDE: 5; 450 INJECTION, SOLUTION INTRAVENOUS at 10:31

## 2022-11-01 RX ADMIN — POTASSIUM CHLORIDE 10 MEQ: 7.46 INJECTION, SOLUTION INTRAVENOUS at 09:04

## 2022-11-01 RX ADMIN — DEXTROSE AND SODIUM CHLORIDE: 5; 450 INJECTION, SOLUTION INTRAVENOUS at 17:16

## 2022-11-01 RX ADMIN — SODIUM BICARBONATE: 84 INJECTION, SOLUTION INTRAVENOUS at 04:27

## 2022-11-01 RX ADMIN — SODIUM PHOSPHATE, MONOBASIC, MONOHYDRATE 10 MMOL: 276; 142 INJECTION, SOLUTION INTRAVENOUS at 20:35

## 2022-11-01 RX ADMIN — POTASSIUM CHLORIDE 10 MEQ: 7.46 INJECTION, SOLUTION INTRAVENOUS at 13:59

## 2022-11-01 RX ADMIN — POTASSIUM CHLORIDE 10 MEQ: 7.46 INJECTION, SOLUTION INTRAVENOUS at 23:34

## 2022-11-01 RX ADMIN — POTASSIUM CHLORIDE 10 MEQ: 7.46 INJECTION, SOLUTION INTRAVENOUS at 07:10

## 2022-11-01 RX ADMIN — AMPICILLIN SODIUM AND SULBACTAM SODIUM 3000 MG: 2; 1 INJECTION, POWDER, FOR SOLUTION INTRAMUSCULAR; INTRAVENOUS at 16:34

## 2022-11-01 RX ADMIN — POTASSIUM CHLORIDE 10 MEQ: 7.46 INJECTION, SOLUTION INTRAVENOUS at 21:40

## 2022-11-01 RX ADMIN — POTASSIUM CHLORIDE 10 MEQ: 7.46 INJECTION, SOLUTION INTRAVENOUS at 05:44

## 2022-11-01 RX ADMIN — ENOXAPARIN SODIUM 40 MG: 100 INJECTION SUBCUTANEOUS at 09:05

## 2022-11-01 RX ADMIN — SODIUM CHLORIDE 1000 ML: 9 INJECTION, SOLUTION INTRAVENOUS at 05:54

## 2022-11-01 RX ADMIN — ONDANSETRON 4 MG: 2 INJECTION INTRAMUSCULAR; INTRAVENOUS at 10:03

## 2022-11-01 RX ADMIN — POTASSIUM CHLORIDE 10 MEQ: 7.46 INJECTION, SOLUTION INTRAVENOUS at 11:34

## 2022-11-01 RX ADMIN — SODIUM CHLORIDE: 9 INJECTION, SOLUTION INTRAVENOUS at 04:40

## 2022-11-01 RX ADMIN — ATORVASTATIN CALCIUM 20 MG: 10 TABLET, FILM COATED ORAL at 09:07

## 2022-11-01 RX ADMIN — POTASSIUM CHLORIDE 10 MEQ: 7.46 INJECTION, SOLUTION INTRAVENOUS at 17:17

## 2022-11-01 RX ADMIN — Medication 1 CAPSULE: at 09:07

## 2022-11-01 RX ADMIN — POTASSIUM CHLORIDE 10 MEQ: 7.46 INJECTION, SOLUTION INTRAVENOUS at 18:38

## 2022-11-01 RX ADMIN — MAGNESIUM SULFATE HEPTAHYDRATE 1000 MG: 1 INJECTION, SOLUTION INTRAVENOUS at 06:34

## 2022-11-01 RX ADMIN — SODIUM PHOSPHATE, MONOBASIC, MONOHYDRATE 20 MMOL: 276; 142 INJECTION, SOLUTION INTRAVENOUS at 12:08

## 2022-11-01 RX ADMIN — AMPICILLIN SODIUM AND SULBACTAM SODIUM 3000 MG: 2; 1 INJECTION, POWDER, FOR SOLUTION INTRAMUSCULAR; INTRAVENOUS at 23:03

## 2022-11-01 RX ADMIN — SODIUM CHLORIDE 1000 ML: 9 INJECTION, SOLUTION INTRAVENOUS at 02:15

## 2022-11-01 RX ADMIN — VANCOMYCIN HYDROCHLORIDE 1500 MG: 10 INJECTION, POWDER, LYOPHILIZED, FOR SOLUTION INTRAVENOUS at 04:45

## 2022-11-01 RX ADMIN — ONDANSETRON 4 MG: 2 INJECTION INTRAMUSCULAR; INTRAVENOUS at 18:58

## 2022-11-01 SDOH — ECONOMIC STABILITY: FOOD INSECURITY: WITHIN THE PAST 12 MONTHS, THE FOOD YOU BOUGHT JUST DIDN'T LAST AND YOU DIDN'T HAVE MONEY TO GET MORE.: NEVER TRUE

## 2022-11-01 ASSESSMENT — PAIN DESCRIPTION - LOCATION: LOCATION: MOUTH

## 2022-11-01 ASSESSMENT — LIFESTYLE VARIABLES
HOW MANY STANDARD DRINKS CONTAINING ALCOHOL DO YOU HAVE ON A TYPICAL DAY: PATIENT DOES NOT DRINK
HOW OFTEN DO YOU HAVE A DRINK CONTAINING ALCOHOL: NEVER

## 2022-11-01 ASSESSMENT — PAIN - FUNCTIONAL ASSESSMENT: PAIN_FUNCTIONAL_ASSESSMENT: 0-10

## 2022-11-01 ASSESSMENT — PAIN SCALES - GENERAL: PAINLEVEL_OUTOF10: 8

## 2022-11-01 NOTE — CARE COORDINATION
CASE MANAGEMENT INITIAL ASSESSMENT    Reviewed chart and completed assessment with patient's son and daughters at bedside  Family present: son Aneita Bosworth, daughter Shani Hensley and another younger daughter  Explained Case Management role/services. Health Care Decision Maker :   Primary Decision Maker: Connor Mcadams - [phone]    Secondary Decision Maker: Cecilia Abbasi Child - [phone]        Admit date/status:11/1 Inpatient  Diagnosis:Dka, Type 2, Not At Goal    Is this a Readmission?:  No      Insurance:Medicare, [place] secondary   Precert required for SNF: No       3 night stay required: Yes    Living arrangements, Adls, care needs, prior to admission:Pt lives at home with wife and teenage daughter, uses cane, drives, independent. Pt is a retired CBL . Current PCP:Dr Barby Bernal, family not sure if pt still sees Endocrinologist or if PCP handles all diabetic med    Medications: Prescription coverage? Yes Will pt require financial assistance with medications No     Transportation needs: Family      PT/OT recs:Not ordered yet    Hospital Exemption Notification (HEN):for SNF only    Barriers to discharge:None noted    Plan/comments: Family states pt typically always takes his PO medications but because of oral surgery Friday, he did not over the weekend. Family concerned if pt needs to take insulin at home, he likely will refuse because of fear of needles. Family denies any discharge needs for pt at this time, they will provide support at home as needed. CM will continue to follow pt's progress and coordinate discharge arrangements as appropriate. ECOC on chart for MD signature.   AAMIR Chacon-RN

## 2022-11-01 NOTE — H&P
Hospital Medicine History & Physical      PCP: Marcos Bernstein DO    Date of Admission: 11/1/2022    Date of Service: Pt seen/examined on 11/1/2022 and Admitted to Inpatient with expected LOS greater than two midnights due to medical therapy. Chief Complaint: Nausea, vomiting      History Of Present Illness:    74 y.o. male who presented to [facility] with above complaints  Patient with PMH of DM2, HTN, HLD arrived via EMS after multiple episodes of nausea and vomiting, lethargy and fatigue. Patient's spouse present at bedside who provides much of the history. She reports patient underwent multiple teeth extraction on Friday. He was discharged on Norco and clindamycin. Was apparently doing well until yesterday. He began to have multiple bouts of nausea and vomiting. Spouse reports that she stopped the Norco because he was getting loopy and instead gave him ibuprofen with good pain relief. Patient denies any abdominal pain. Vomitus is nonbilious and nonbloody. She has had mild swelling to the right side of his face. No subjective fevers or chills reported. She reports about 3 weeks back he was taken off of exenatide, and there was plans of starting him on Tirzepatide inj which she has not started yet. He did not take his glipizide and metformin over the weekend and has had decreased p.o. intake. Has had diabetes for over 10 years but never been in DKA. His last A1c on 10/11/2022 was 11.5. Past Medical History:          Diagnosis Date    Allergic rhinitis     Diabetes mellitus ([address]     Dizziness     Hearing loss     Hyperlipidemia     Hypertension        Past Surgical History:          Procedure Laterality Date    SHOULDER ARTHROSCOPY      right    TONSILLECTOMY         Medications Prior to Admission:      Prior to Admission medications    Medication Sig Start Date End Date Taking?  Authorizing Provider   Tirzepatide [facility]) 5 MG/0.5ML SOPN SC injection Inject 0.5 mLs into the skin once a week 10/11/22   Antonio De La Vega DO   Tirzepatide [facility]) 7.5 MG/0.5ML SOPN SC injection Inject 0.5 mLs into the skin once a week 10/11/22   Antonio De La Vega DO   glucose monitoring (FREESTYLE FREEDOM) kit 1 kit by Does not apply route daily 10/11/22   Ashley Jacques DO   Lancets MISC 1 each by Does not apply route daily 10/11/22   Antonio De La Vega DO   blood glucose monitor strips Test 1 times a day & as needed for symptoms of irregular blood glucose. Dispense sufficient amount for indicated testing frequency plus additional to accommodate PRN testing needs. 10/11/22   Antonio De La Vega DO   albuterol sulfate HFA (PROVENTIL HFA) 108 (90 Base) MCG/ACT inhaler Inhale 2 puffs into the lungs every 4 hours as needed for Wheezing or Shortness of Breath 7/11/22   KALEIGH Lozano CNP   empagliflozin (JARDIANCE) 25 MG tablet Take 1 tablet by mouth daily 6/15/22   Ashley Jacques DO   lisinopril (PRINIVIL;ZESTRIL) 30 MG tablet Take 1 tablet by mouth daily 6/15/22   Antonio De La Vega DO   glipiZIDE (GLUCOTROL) 10 MG tablet Take 1 tablet by mouth 2 times daily (before meals) 6/15/22   Ashley Jacques, DO   metFORMIN (GLUCOPHAGE) 1000 MG tablet Take 1 tablet by mouth 2 times daily (with meals) 6/15/22   Ashley Jacques DO   atorvastatin (LIPITOR) 20 MG tablet Take 1 tablet by mouth daily 6/15/22   Ashley Jacques DO   Omega-3 Fatty Acids (FISH OIL) 1000 MG CAPS Take 3,000 mg by mouth 3 times daily. Historical Provider, MD   aspirin 81 MG EC tablet Take 81 mg by mouth daily. Historical Provider, MD   CINNAMON PO Take 1,000 mg by mouth 2 times daily. Historical Provider, MD   Multiple Vitamin (MULTI-VITAMIN PO) Take  by mouth. Historical Provider, MD       Allergies:  Latex    Social History:      The patient currently lives at home    TOBACCO:   reports that he quit smoking about 31 years ago. His smoking use included cigarettes. He has a 22.50 pack-year smoking history.  He quit smokeless tobacco use about 42 years ago. ETOH:   reports no history of alcohol use. E-cigarette/Vaping       Questions Responses    E-cigarette/Vaping Use Never User    Start Date     Passive Exposure     Quit Date     Counseling Given     Comments               Family History:      Reviewed and negative in regards to presenting illness/complaint. Problem Relation Age of Onset    Cancer Mother         breast    Celiac Disease Daughter     Hashimoto Thyroiditis Daughter        REVIEW OF SYSTEMS COMPLETED:   Pertinent positives as noted in the HPI. All other systems reviewed and negative. PHYSICAL EXAM PERFORMED:    BP (!) 154/82   Pulse (!) 115   Temp (!) 94.7 °F (34.8 °C) (Bladder)   Resp 23   Ht 6' (1.829 m)   Wt 178 lb 9.2 oz (81 kg)   SpO2 100%   BMI 24.22 kg/m²     General appearance: Somnolent and hyperventilating, no apparent distress, appears stated age and cooperative. HEENT:  Normal cephalic, atraumatic without obvious deformity. Pupils equal, round, and reactive to light. Extra ocular muscles intact. Conjunctivae/corneas clear. Edentulous, erythema around the gumline both in the mandible and maxillary, multiple sutures present, no fluctuant swelling in the gums, no purulent discharge  Ecchymosis around the right eye  Neck: Supple, with full range of motion. No jugular venous distention. Trachea midline. Respiratory: Tachypneic, normal respiratory effort. Clear to auscultation, bilaterally without Rales/Wheezes/Rhonchi. Cardiovascular: Tachycardic, regular  rhythm with normal S1/S2 without murmurs, rubs or gallops. Abdomen: Soft, non-tender, non-distended with normal bowel sounds. Musculoskeletal:  No clubbing, cyanosis or edema bilaterally. Full range of motion without deformity. Skin: Skin color, texture, turgor normal.  No rashes or lesions. Neurologic: Somnolent but easily arousable, neurovascularly intact without any focal sensory/motor deficits.  Cranial nerves: II-XII intact, grossly non-focal.  Psychiatric: Calm and cooperative   capillary Refill: Brisk,3 seconds, normal  Peripheral Pulses: +2 palpable, equal bilaterally       Labs:     Recent Labs     11/01/22 0151   WBC 11.1*   HGB 17.5   HCT 55.1*        Recent Labs     11/01/22 0151   *   K 4.4   CL 86*   CO2 5*   BUN 50*   CREATININE 1.4*   CALCIUM 10.2     Recent Labs     11/01/22 0151   AST 8*   ALT 11   BILITOT 0.4   ALKPHOS 128     Recent Labs     11/01/22 0151   INR 1.13     Recent Labs     11/01/22 0151   TROPONINI <0.01       Urinalysis:    No results found for: Carlos Mike, BACTERIA, RBCUA, BLOODU, [address], GLUCOSEU    Radiology:     I reviewed the CT head and CT facial bones, CXR personally and also reviewed the radiologist interpretation      EKG:  I have reviewed the EKG with the following interpretation: Sinus tachycardia    CT HEAD WO CONTRAST   Final Result   No acute intracranial abnormality. CT FACIAL BONES WO CONTRAST   Final Result   1. Nonspecific history of dental extraction. 2 ill-defined collections are   identified within the right posterior maxillary and mandibular molar sites in   a patient with no remaining teeth. This could be sequela of recent   extraction. Inflammation or developing abscess cannot be excluded. Correlate with history and physical exam.   2. Superficial soft tissue swelling of the right face overlying these regions. XR CHEST PORTABLE   Final Result   No acute pulmonary finding.              Consults:    IP CONSULT TO HOSPITALIST  IP CONSULT TO DIABETES EDUCATOR    ASSESSMENT:  PLAN:    Active Hospital Problems    Diagnosis Date Noted    DKA, type 2, not at goal [facility]) [E11.10] 11/01/2022     Priority: Medium    History of tooth extraction, unspecified edentulism [K08.409] 11/01/2022     Priority: Medium    High anion gap metabolic acidosis [D90.68] 11/01/2022     Priority: Medium    Hypertension associated with diabetes ([facility] [address] [E11.59, I15.2] 07/01/2013    Hyperlipidemia associated with type 2 diabetes mellitus ([facility] [address] [E11.69, E78.5] 07/01/2013     Diabetic ketoacidosis  Type II diabetic x10 years, last A1c 11.5 in October  Trigger-recent oral surgery for multiple teeth extraction, could not tolerate p.o. diabetic meds  Labs-blood glucose 397, bicarb 5, anion gap 42, pH 7.05 with PCO2 18 consistent with DKA  -Admit to ICU  -IV fluids per DKA protocol. We will ensure he gets at least 3 L IV fluid bolus  -IV insulin gtt per DKA protocol ordered  -Every hour Accu-Cheks, every 4 hours electrolytes  -N.p.o. except ice chips/water  -Monitor urine output, electrolytes and renal panel  -Check A1c    History of teeth extraction, unspecified edentulism  -CT showed no evidence of obvious abscess, most likely postoperative changes. Clinically could not appreciate any abscess  -We will cover empirically with IV Unasyn for now, can DC pending clinical course    AG MA -mostly due to DKA, some due to lactic acidosis. Should resolve with DKA protocol and IV fluids. HTN-controlled, holding lisinopril due to MOISÉS, monitor BP    HLD-resume statin    DVT Prophylaxis: Lovenox  Diet: Diet NPO  Code Status: Full Code    PT/OT Eval Status: Not consulted    Dispo -IP stay    Total critical care time caring for this patient with life threatening, unstable organ failure, including direct patient contact, management of life support systems, review of data including imaging and labs, discussions with other team members and physicians at least 35 min so far today, excluding procedures. Joann Potts MD    Thank you Perla Christy DO for the opportunity to be involved in this patient's care. If you have any questions or concerns please feel free to contact me at [phone].

## 2022-11-01 NOTE — PROGRESS NOTES
Call from lab received with critical mag <0.2 and Phos <0.4.(labs were drawn at 0151). Message sent to MD, PRN replacements being followed at this time. Orders obtained to redraw labs.

## 2022-11-01 NOTE — PROGRESS NOTES
Hospitalist Progress Note Addendum    The patient was admitted after midnight for   Principal Problem:    DKA, type 2, not at goal [facility])  Active Problems:    History of tooth extraction, unspecified edentulism    High anion gap metabolic acidosis    Hypertension associated with diabetes ([facility] [address]    Hyperlipidemia associated with type 2 diabetes mellitus ([facility] [address]  Resolved Problems:    * No resolved hospital problems. *  .    Brief update:  74yo WM with PMhx sig for DM2, HTN, HLd who presents with intractable nausea and vomiting several days after multiple tooth extractions. Pt was initially discharged on norca and clindamycin and stopped norco due to feeling loopy. He did not take his glipizide, jardiance and metformin over the weekend due to decreased PO intake. He is supposed to start tirzepatide injections. He has never had DKA, however last hgb a1c was elevated at 11.5%. He has swelling in mouth from tooth extractions as well as two areas of fluid collection - blood/serum vs less likely abscess so he was started on unasyn. Anion gap closing slowly, but pt on appropriate fluids and insulin drip. Falsely low mag and phos (lab error) corrected labs are OK but will continue with electrolyte replacement protocol.     /79   Pulse (!) 121   Temp 96.8 °F (36 °C)   Resp 29   Ht 6' (1.829 m)   Wt 178 lb 9.2 oz (81 kg)   SpO2 100%   BMI 24.22 kg/m²       CBC with Differential:    Lab Results   Component Value Date/Time    WBC 11.1 11/01/2022 01:51 AM    RBC 6.00 11/01/2022 01:51 AM    HGB 17.5 11/01/2022 01:51 AM    HCT 55.1 11/01/2022 01:51 AM     11/01/2022 01:51 AM    MCV 91.8 11/01/2022 01:51 AM    MCH 29.2 11/01/2022 01:51 AM    MCHC 31.8 11/01/2022 01:51 AM    RDW 13.1 11/01/2022 01:51 AM    SEGSPCT 57.9 08/02/2011 11:21 AM    BANDSPCT 5 11/01/2022 01:51 AM    LYMPHOPCT 4.0 11/01/2022 01:51 AM    MONOPCT 3.0 11/01/2022 01:51 AM    EOSPCT 1.8 08/02/2011 11:21 AM    BASOPCT 0.0 11/01/2022 01:51 AM    MONOSABS 0.3 11/01/2022 01:51 AM    LYMPHSABS 0.4 11/01/2022 01:51 AM    EOSABS 0.0 11/01/2022 01:51 AM    BASOSABS 0.0 11/01/2022 01:51 AM    DIFFTYPE Auto-K 08/02/2011 11:21 AM     BMP:    Lab Results   Component Value Date/Time     11/01/2022 01:51 AM    K 4.4 11/01/2022 01:51 AM    K 4.4 05/07/2020 05:53 AM    CL 86 11/01/2022 01:51 AM    CO2 5 11/01/2022 01:51 AM    BUN 50 11/01/2022 01:51 AM    LABALBU 4.8 11/01/2022 01:51 AM    CREATININE 1.4 11/01/2022 01:51 AM    CALCIUM 10.2 11/01/2022 01:51 AM    GFRAA >60 05/08/2020 08:40 AM    GFRAA >60 03/05/2013 10:00 AM    LABGLOM 56 11/01/2022 01:51 AM    GLUCOSE 397 11/01/2022 01:51 AM     Magnesium:    Lab Results   Component Value Date/Time    MG <0.20 11/01/2022 01:51 AM     Phosphorus:    Lab Results   Component Value Date/Time    PHOS <0.4 11/01/2022 01:51 AM       The patient has been seen and examined. Head:  Mucous membranes extremely dry, significant facial swelling saida on R  CV:  tachy, but regular  Pulm:  clear  Abd:  BS pos, abd soft, NT, ND  Ext:  no edema    I have the following recommendations:    1. DKA - continue with IVF, insulin drip and replacement protocols - may need prolonged drip to close anion gap  2. Multiple tooth extractions with facial swelling - on unasyn currently - monitor closely  3. Lactic acidosis  4. MOISÉS - already improving with aggressive IVF  5.   Possible sepsis, but more likely DKA POA based on tachycardia, leukocytosis, hypothermia, 2 fluid collections in R post molar sites more likely blood/serum, but will cover with abx            Electronically signed by Gina Lau MD on 11/1/2022 at 7:16 AM

## 2022-11-01 NOTE — ED PROVIDER NOTES
Years since quittin.5    Smokeless tobacco: Former     Quit date:    Vaping Use    Vaping Use: Never used   Substance and Sexual Activity    Alcohol use: No    Drug use: No    Sexual activity: Yes   Other Topics Concern    Not on file   Social History Narrative    Not on file     Social Determinants of Health     Financial Resource Strain: Low Risk     Difficulty of Paying Living Expenses: Not hard at all   Food Insecurity: No Food Insecurity    Worried About [address] in the Last Year: Never true    [address] [employer] in the Last Year: Never true   Transportation Needs: No Transportation Needs    Lack of Transportation (Medical): No    Lack of Transportation (Non-Medical):  No   Physical Activity: Not on file   Stress: Not on file   Social Connections: Not on file   Intimate Partner Violence: Not on file   Housing Stability: Low Risk     Unable to Pay for Housing in the Last Year: No    Number of Places Lived in the Last Year: 1    Unstable Housing in the Last Year: No     Current Facility-Administered Medications   Medication Dose Route Frequency Provider Last Rate Last Admin    aspirin tablet 325 mg  325 mg Oral Once Doreen Dominguez MD        vancomycin 1500 mg in dextrose 5% 300 mL IVPB  1,500 mg IntraVENous Once Doreen Dominguez MD        piperacillin-tazobactam (ZOSYN) 4,500 mg in dextrose 5 % 100 mL IVPB (mini-bag)  4,500 mg IntraVENous Once Doreen Dominguez MD        sodium bicarbonate 50 mEq in dextrose 5 % 1,000 mL infusion   IntraVENous Continuous Doreen Dominguez MD        dextrose bolus 10% 125 mL  125 mL IntraVENous PRN Doreen Dominguez MD        Or    dextrose bolus 10% 250 mL  250 mL IntraVENous PRN Doreen Dominguez MD        potassium chloride 10 mEq/100 mL IVPB (Peripheral Line)  10 mEq IntraVENous PRN Doreen Dominguez MD        magnesium sulfate 1000 mg in dextrose 5% 100 mL IVPB  1,000 mg IntraVENous PRN Doreen Dominguez MD        sodium phosphate 10 mmol in sodium chloride 0.9 % 250 mL IVPB  10 mmol IntraVENous PRN Reza Schroeder MD        Or    sodium phosphate 15 mmol in dextrose 5 % 250 mL IVPB  15 mmol IntraVENous PRN Reza Schroeder MD        Or    sodium phosphate 20 mmol in dextrose 5 % 500 mL IVPB  20 mmol IntraVENous PRN eRza Schroeder MD        0.45 % sodium chloride infusion   IntraVENous Continuous Reza Schroeder MD        insulin regular (HUMULIN R;NOVOLIN R) injection 9 Units  0.1 Units/kg IntraVENous Once Reza Schroeder MD        insulin regular (HUMULIN R;NOVOLIN R) 100 Units in sodium chloride 0.9 % 100 mL infusion  0.01-0.5 Units/kg/hr IntraVENous Continuous Reza Schroeder MD        0.9 % sodium chloride bolus  1,000 mL IntraVENous Once Reza Schroeder MD        dextrose 5 % and 0.45 % sodium chloride infusion   IntraVENous Continuous PRN Reza Schroeder MD         Current Outpatient Medications   Medication Sig Dispense Refill    Tirzepatide (MOUNJARO) 5 MG/0.5ML SOPN SC injection Inject 0.5 mLs into the skin once a week 4 Adjustable Dose Pre-filled Pen Syringe 0    Tirzepatide (MOUNJARO) 7.5 MG/0.5ML SOPN SC injection Inject 0.5 mLs into the skin once a week 4 Adjustable Dose Pre-filled Pen Syringe 0    glucose monitoring (FREESTYLE FREEDOM) kit 1 kit by Does not apply route daily 1 kit 0    Lancets MISC 1 each by Does not apply route daily 100 each 5    blood glucose monitor strips Test 1 times a day & as needed for symptoms of irregular blood glucose. Dispense sufficient amount for indicated testing frequency plus additional to accommodate PRN testing needs.  100 strip 1    albuterol sulfate HFA (PROVENTIL HFA) 108 (90 Base) MCG/ACT inhaler Inhale 2 puffs into the lungs every 4 hours as needed for Wheezing or Shortness of Breath 18 g 3    empagliflozin (JARDIANCE) 25 MG tablet Take 1 tablet by mouth daily 90 tablet 1    lisinopril (PRINIVIL;ZESTRIL) 30 MG tablet Take 1 tablet by mouth daily 90 tablet 1    glipiZIDE (GLUCOTROL) 10 MG tablet Take 1 tablet by mouth 2 times daily (before meals) 180 tablet 1    metFORMIN (GLUCOPHAGE) 1000 MG tablet Take 1 tablet by mouth 2 times daily (with meals) 180 tablet 1    atorvastatin (LIPITOR) 20 MG tablet Take 1 tablet by mouth daily 90 tablet 1    Omega-3 Fatty Acids (FISH OIL) 1000 MG CAPS Take 3,000 mg by mouth 3 times daily. aspirin 81 MG EC tablet Take 81 mg by mouth daily. CINNAMON PO Take 1,000 mg by mouth 2 times daily. Multiple Vitamin (MULTI-VITAMIN PO) Take  by mouth. Allergies   Allergen Reactions    Latex Itching       PMH, Surgical Hx, FH, Social Hx reviewed by myself. REVIEW OF SYSTEMS  10 systems reviewed, pertinent positives per HPI otherwise noted to be negative. PHYSICAL EXAM  BP (!) 165/86   Pulse 99   Temp 98 °F (36.7 °C)   Resp 17   Ht 6' (1.829 m)   Wt 200 lb (90.7 kg)   SpO2 100%   BMI 27.12 kg/m²    GENERAL APPEARANCE: Drowsy. Arousable to verbal and physical stimulation. Able to answer simple questions. HENT: Normocephalic. Atraumatic. EOMI. No facial droop. Edentulous. Granulation tissue. HEART/CHEST: Normotensive. Tachycardic to 121. LUNGS: Increased work of breathing. Satting greater than 90% on room air. ABDOMEN: Soft, non-distended abdomen. Non tender to palpation. No guarding. No rebound. EXTREMITIES: No gross deformities. Moving all extremities. SKIN: Warm and dry. No acute rashes. NEUROLOGICAL: Confused. Drowsy. No gross facial drooping. Moving all extremities. PSYCHIATRIC: Pleasant. Normal mood and affect.     LABS  Results for orders placed or performed during the hospital encounter of 11/01/22   Comprehensive Metabolic Panel   Result Value Ref Range    Sodium 133 (L) 136 - 145 mmol/L    Potassium 4.4 3.5 - 5.1 mmol/L    Chloride 86 (L) 99 - 110 mmol/L    CO2 5 (LL) 21 - 32 mmol/L    Anion Gap 42 (H) 3 - 16    Glucose 397 (H) 70 - 99 mg/dL    BUN 50 (H) 7 - 20 mg/dL    Creatinine 1.4 (H) 0.8 - 1.3 mg/dL    Est, Glom Filt Rate 56 (A) >60    Calcium 10.2 8.3 - 10.6 mg/dL    Total Protein 8.8 (H) 6.4 - 8.2 g/dL    Albumin 4.8 3.4 - 5.0 g/dL    Albumin/Globulin Ratio 1.2 1.1 - 2.2    Total Bilirubin 0.4 0.0 - 1.0 mg/dL    Alkaline Phosphatase 128 40 - 129 U/L    ALT 11 10 - 40 U/L    AST 8 (L) 15 - 37 U/L   CBC with Auto Differential   Result Value Ref Range    WBC 11.1 (H) 4.0 - 11.0 K/uL    RBC 6.00 (H) 4.20 - 5.90 M/uL    Hemoglobin 17.5 13.5 - 17.5 g/dL    Hematocrit 55.1 (H) 40.5 - 52.5 %    MCV 91.8 80.0 - 100.0 fL    MCH 29.2 26.0 - 34.0 pg    MCHC 31.8 31.0 - 36.0 g/dL    RDW 13.1 12.4 - 15.4 %    Platelets 155 982 - 514 K/uL    MPV 8.5 5.0 - 10.5 fL    PLATELET SLIDE REVIEW Adequate     Neutrophils % 88.0 %    Lymphocytes % 4.0 %    Monocytes % 3.0 %    Eosinophils % 0.0 %    Basophils % 0.0 %    Neutrophils Absolute 10.3 (H) 1.7 - 7.7 K/uL    Lymphocytes Absolute 0.4 (L) 1.0 - 5.1 K/uL    Monocytes Absolute 0.3 0.0 - 1.3 K/uL    Eosinophils Absolute 0.0 0.0 - 0.6 K/uL    Basophils Absolute 0.0 0.0 - 0.2 K/uL    Bands Relative 5 0 - 7 %    RBC Morphology Normal    Brain Natriuretic Peptide   Result Value Ref Range    Pro- 0 - 124 pg/mL   Lipase   Result Value Ref Range    Lipase 15.0 13.0 - 60.0 U/L   Troponin   Result Value Ref Range    Troponin <0.01 <0.01 ng/mL   Lactic Acid   Result Value Ref Range    Lactic Acid 3.8 (H) 0.4 - 2.0 mmol/L   Blood Gas, Venous   Result Value Ref Range    pH, Saurabh 7.052 (LL) 7.350 - 7.450    pCO2, Saurabh 18.4 (L) 40.0 - 50.0 mmHg    pO2, Saurabh 98.5 (H) 25.0 - 40.0 mmHg    HCO3, Venous 5.0 (L) 23.0 - 29.0 mmol/L    Base Excess, Saurabh -23.5 (L) -3.0 - 3.0 mmol/L    O2 Sat, Saurabh 96 Not Established %    Carboxyhemoglobin 1.3 0.0 - 1.5 %    MetHgb, Saurabh 0.6 <1.5 %    TC02 (Calc), Saurabh 6 Not Established mmol/L    O2 Therapy Unknown    Protime-INR   Result Value Ref Range    Protime 14.4 11.7 - 14.5 sec    INR 1.13 0.87 - 1.14   APTT   Result Value Ref Range    aPTT 28.4 23.0 - 34.3 sec   POCT Glucose   Result Value Ref Range    POC Glucose 434 (H) 70 - 99 mg/dl    Performed on ACCU-CHEK        I have reviewed all labs for this visit. ECG  The Ekg interpreted by me shows  Sinus tachycardia with a rate of 117  Axis is normal  QTc is prolonged at 477  Intervals and Durations are unremarkable. RADIOLOGY  CT HEAD WO CONTRAST    Result Date: 11/1/2022  EXAMINATION: CT OF THE HEAD WITHOUT CONTRAST  11/1/2022 1:46 am TECHNIQUE: CT of the head was performed without the administration of intravenous contrast. Automated exposure control, iterative reconstruction, and/or weight based adjustment of the mA/kV was utilized to reduce the radiation dose to as low as reasonably achievable. COMPARISON: 02/14/2019 CT HISTORY: ORDERING SYSTEM PROVIDED HISTORY: fall? TECHNOLOGIST PROVIDED HISTORY: Has a \"code stroke\" or \"stroke alert\" been called? ->No Reason for exam:->fall? Decision Support Exception - unselect if not a suspected or confirmed emergency medical condition->Emergency Medical Condition (MA) Reason for Exam: Nausea; Dental Problem; Fatigue FINDINGS: BRAIN/VENTRICLES: The ventricles and sulci are prominent. Pattern is consistent with age-related atrophy. No extra-axial fluid collections, and no sign of recent intracranial hemorrhage. Decreased attenuation is noted within the periventricular white matter. Pattern is consistent with chronic small vessel ischemic change. Remote area of mild encephalomalacia in the left frontal lobe that is similar to the prior CT. No acute edema or mass effect. No mass lesions are detected. ORBITS: The visualized portion of the orbits demonstrate no acute abnormality. SINUSES: The visualized paranasal sinuses and mastoid air cells demonstrate no acute abnormality. SOFT TISSUES/SKULL:  No acute abnormality of the visualized skull or soft tissues. No acute intracranial abnormality.      CT FACIAL BONES WO CONTRAST    Result Date: 11/1/2022  EXAMINATION: CT OF THE FACE WITHOUT CONTRAST 11/1/2022 1:46 am TECHNIQUE: CT of the face was performed without the administration of intravenous contrast. Multiplanar reformatted images are provided for review. Automated exposure control, iterative reconstruction, and/or weight based adjustment of the mA/kV was utilized to reduce the radiation dose to as low as reasonably achievable. COMPARISON: None HISTORY: ORDERING SYSTEM PROVIDED HISTORY: dental extraction. SOA TECHNOLOGIST PROVIDED HISTORY: Reason for exam:->dental extraction. SOA Decision Support Exception - unselect if not a suspected or confirmed emergency medical condition->Emergency Medical Condition (MA) Reason for Exam: dental extraction friday . SOA  , N/V - no fall FINDINGS: FACIAL BONES: The frontal sinuses, orbital walls, maxilla, pterygoid plates, zygomatic arches, hard palate, nasal bones and mandible are intact. The temporomandibular joints are aligned. Provided history of dental extraction. There is ill-defined fluid, air and soft tissue within the mandible at the posterior molar on the right which may correlate to this recent history. Similar pattern to the posterior right maxillary molar. A discrete abscess cannot be confirmed, but developing abscess at each of these locations may be considered. Recommend correlation with physical exam. ORBITAL CONTENTS: The globes appear intact. The extraocular muscles, optic nerve sheath complexes and lacrimal glands appear unremarkable. No retrobulbar hematoma or mass is seen. SINUSES: Moderate mucosal thickening in the right maxillary sinus. Paranasal sinuses are otherwise clear. SOFT TISSUES: Mild asymmetric soft tissue swelling of the right face. 1. Nonspecific history of dental extraction. 2 ill-defined collections are identified within the right posterior maxillary and mandibular molar sites in a patient with no remaining teeth. This could be sequela of recent extraction. Inflammation or developing abscess cannot be excluded. Correlate with history and physical exam. 2. Superficial soft tissue swelling of the right face overlying these regions. XR CHEST PORTABLE    Result Date: 11/1/2022  EXAMINATION: ONE XRAY VIEW OF THE CHEST 11/1/2022 1:36 am COMPARISON: 01/16/2017 radiograph HISTORY: ORDERING SYSTEM PROVIDED HISTORY: soa TECHNOLOGIST PROVIDED HISTORY: Reason for exam:->soa Reason for Exam: sob FINDINGS: The heart, mediastinum and pulmonary vascularity are normal.  The lungs are clear. Since the prior exam, multiple left-sided rib fractures have occurred with callus and deformity. There is also a ORIF of the left clavicle. Band of atelectasis in the left lower lobe. No acute pulmonary finding. ED COURSE/MDM  Patient seen and evaluated. At presentation, patient was awake, alert, afebrile, hypertensive to 165/86, and satting 100% on room air. Patient was tachypneic, had deep inspiration and expiration, concerning for kussmaul breathing. Patient placed on cardiac monitor for close observation. Point-of-care glucose obtained and was 434. Creatinine is 1.4, up from 0.81 in November 2021. Has elevated anion gap of 42. Glucose 397.   pH and 7.052. Bicarb 5. He was given an ampule of bicarb. Patient in DKA and started on DKA protocol. Given 30cc/kg IVF bolus. COVID-negative. Influenza a and B are negative. CT head shows no acute intracranial bleed. CT face shows nonspecific history of dental extraction. Ill-defined collections are identified within the right posterior maxillary mandibular molar sites with no remaining teeth. This could be sequela of recent extraction versus early inflammation or developing abscess. Lactic elevated at 3.8. No fevers. No leukocytosis. Suspect the elevated lactic due to dehydration from poor oral intake from dental surgery. However, given this, will obtain infectious work up including blood cultures. He was given vancomycin and Zosyn for empiric antibiotics. Troponin negative. Lipase within normal limits. He remained hemodynamically stable during his stay in the ER. Repeat lactic ordered and pending at this time. Hospitalist consulted for admission for further evaluation and treatment. Admit. Is this patient to be included in the SEP-1 Core Measure due to severe sepsis or septic shock? No   Exclusion criteria - the patient is NOT to be included for SEP-1 Core Measure due to: Alternative explanation for abnormal labs/vitals that do not relate to sepsis, see MDM for further explanation - DKA from poor oral intake due to oral surgery. I provided at least 35 minutes of critical care excluding separately billable procedures. Pt was seen during the [place] 19 pandemic. Appropriate PPE worn by ME during patient encounters. Patient was cared for during a time with constrained hospital bed capacity with nationwide stress on resources and staffing.       During the patient's ED course, the patient was given:  Medications   aspirin tablet 325 mg (has no administration in time range)   vancomycin 1500 mg in dextrose 5% 300 mL IVPB (has no administration in time range)   piperacillin-tazobactam (ZOSYN) 4,500 mg in dextrose 5 % 100 mL IVPB (mini-bag) (has no administration in time range)   sodium bicarbonate 50 mEq in dextrose 5 % 1,000 mL infusion (has no administration in time range)   dextrose bolus 10% 125 mL (has no administration in time range)     Or   dextrose bolus 10% 250 mL (has no administration in time range)   potassium chloride 10 mEq/100 mL IVPB (Peripheral Line) (has no administration in time range)   magnesium sulfate 1000 mg in dextrose 5% 100 mL IVPB (has no administration in time range)   sodium phosphate 10 mmol in sodium chloride 0.9 % 250 mL IVPB (has no administration in time range)     Or   sodium phosphate 15 mmol in dextrose 5 % 250 mL IVPB (has no administration in time range)     Or   sodium phosphate 20 mmol in dextrose 5 % 500 mL IVPB (has no administration in time range)   0.45 % sodium chloride infusion (has no administration in time range)   insulin regular (HUMULIN R;NOVOLIN R) injection 9 Units (has no administration in time range)   insulin regular (HUMULIN R;NOVOLIN R) 100 Units in sodium chloride 0.9 % 100 mL infusion (has no administration in time range)   0.9 % sodium chloride bolus (has no administration in time range)   dextrose 5 % and 0.45 % sodium chloride infusion (has no administration in time range)   0.9 % sodium chloride bolus (1,000 mLs IntraVENous New Bag 11/1/22 3101)        CLINICAL IMPRESSION  1. Diabetic ketoacidosis without coma associated with diabetes mellitus due to underlying condition ([facility] [address]    2. MOISÉS (acute kidney injury) ([facility] [address]        Blood pressure (!) 165/86, pulse 99, temperature 98 °F (36.7 °C), resp. rate 17, height 6' (1.829 m), weight 200 lb (90.7 kg), SpO2 100 %. DISPOSITION  [facility] was admitted to the hospital.     Patient was given scripts for the following medications. I counseled patient how to take these medications. New Prescriptions    No medications on file       Follow-up with:  No follow-up provider specified. DISCLAIMER: This chart was created using Dragon dictation software. Efforts were made by me to ensure accuracy, however some errors may be present due to limitations of this technology and occasionally words are not transcribed correctly.        Massimo Perdomo MD  11/01/22 6251

## 2022-11-01 NOTE — PROGRESS NOTES
Admitted from ED, diagnosed with DKA ( post dental extraction 10/28/2022. With 2 PIVs, from ED,  insulin gtts started. FC  inserted,, PIV on left ventral forearm inserted. 4 eyes skin assessment  and CHG wipes completed.

## 2022-11-01 NOTE — PLAN OF CARE
Problem: Discharge Planning  Goal: Discharge to home or other facility with appropriate resources  Outcome: Progressing  Flowsheets (Taken 11/1/2022 0430)  Discharge to home or other facility with appropriate resources:   Identify barriers to discharge with patient and caregiver   Refer to discharge planning if patient needs post-hospital services based on physician order or complex needs related to functional status, cognitive ability or social support system     Problem: Pain  Goal: Verbalizes/displays adequate comfort level or baseline comfort level  Outcome: Progressing     Problem: Skin/Tissue Integrity  Goal: Absence of new skin breakdown  Description: 1. Monitor for areas of redness and/or skin breakdown  2. Assess vascular access sites hourly  3. Every 4-6 hours minimum:  Change oxygen saturation probe site  4. Every 4-6 hours:  If on nasal continuous positive airway pressure, respiratory therapy assess nares and determine need for appliance change or resting period.   Outcome: Progressing     Problem: Safety - Adult  Goal: Free from fall injury  Outcome: Progressing

## 2022-11-01 NOTE — PROGRESS NOTES
Shift: 1900H-0700H    Admitting diagnosis: DKA TYPE 2    Presentation to hospital:  HIGH BLOOD SUGAR LEVEL, DROWSY    Surgery: NO    Nursing assessment at handoff  stable    Emergency Contact/POA: [facility]  Family updated: YES    Most recent vitals: /79   Pulse (!) 121   Temp 96.8 °F (36 °C)   Resp 29   Ht 6' (1.829 m)   Wt 178 lb 9.2 oz (81 kg)   SpO2 100%   BMI 24.22 kg/m²      Rhythm:SINUS TACHY        Increased O2 requirements: NO, ROOM AIR    Admission weight Weight: 200 lb (90.7 kg)  Today's weight   Wt Readings from Last 1 Encounters:   11/01/22 178 lb 9.2 oz (81 kg)         UOP >30ml/hr: YES    Rouse need assessed each shift: YES  Restraints:NO:  Lines/Drains  LDA Insertion Date Discontinued Date Dressing Changes   PIV  11/1/22     TLC       Arterial       Rouse  11/1/22     Vas Cath      ETT       Surgical drains        Night Shift Hospitalist Interventions    Problem(Brief) Date Time Intervention Physician contacted                                               Drip rates at handoff:    sodium chloride 200 mL/hr at 11/01/22 0735    dextrose 5 % and 0.45 % NaCl      insulin 0.112 Units/kg/hr (11/01/22 0730)       Hospital Course Daily Updates:  Admit Day# 0  -3L NSS BOLUSES  -20mEQ K GIVEN  -93.5 TEMP UPON ADMISSION, BEAR HUGGER  -INSULIN GTTS     Lab Data:   CBC:   Recent Labs     11/01/22 0151   WBC 11.1*   HGB 17.5   HCT 55.1*   MCV 91.8        BMP:    Recent Labs     11/01/22 0151 11/01/22  0700   * 137   K 4.4 4.0   CO2 5* 6*   BUN 50* 53*   CREATININE 1.4* 1.3     LIVR:   Recent Labs     11/01/22 0151   AST 8*   ALT 11     PT/INR:   Recent Labs     11/01/22 0151   PROT 8.8*   INR 1.13     APTT:   Recent Labs     11/01/22 0151   APTT 28.4     ABG: No results for input(s): PHART, UZT1LAX, PO2ART in the last 72 hours.   Consults (if GI or Nephrology- which group?)- ICU

## 2022-11-01 NOTE — PROGRESS NOTES
Shift: [id number]    Admitting diagnosis: DKA TYPE 2    Presentation to hospital:  HIGH BLOOD SUGAR LEVEL, DROWSY    Surgery: NO    Nursing assessment at handoff  stable    Emergency Contact/POA: [facility]  Family updated: YES    Most recent vitals: /67   Pulse (!) 102   Temp 98.5 °F (36.9 °C) (Bladder)   Resp 16   Ht 6' (1.829 m)   Wt 178 lb 9.2 oz (81 kg)   SpO2 96%   BMI 24.22 kg/m²      Rhythm:SINUS TACHY        Increased O2 requirements: NO, ROOM AIR    Admission weight Weight: 200 lb (90.7 kg)  Today's weight   Wt Readings from Last 1 Encounters:   11/01/22 178 lb 9.2 oz (81 kg)         UOP >30ml/hr: YES    Rouse need assessed each shift: YES  Restraints:NO:  Lines/Drains  LDA Insertion Date Discontinued Date Dressing Changes   PIV  11/1/22     TLC       Arterial       Rouse  11/1/22     Vas Cath      ETT       Surgical drains        Night Shift Hospitalist Interventions    Problem(Brief) Date Time Intervention Physician contacted                                               Drip rates at handoff:    sodium chloride Stopped (11/01/22 1000)    dextrose 5 % and 0.45 % NaCl 150 mL/hr at 11/01/22 1716    insulin 0.003 Units/kg/hr (11/01/22 1805)       Hospital Course Daily Updates:  Admit Day# 0  -3L NSS BOLUSES  -20mEQ K GIVEN  -93.5 TEMP UPON ADMISSION, BEAR HUGGER. Now off, normothermic. -INSULIN GTTS   - Remains lethargic but arousable. Disoriented to situation/place, oriented time and person.      Lab Data:   CBC:   Recent Labs     11/01/22 0151   WBC 11.1*   HGB 17.5   HCT 55.1*   MCV 91.8          BMP:    Recent Labs     11/01/22  1118 11/01/22  1519    137   K 4.5 4.2   CO2 12* 15*   BUN 42* 38*   CREATININE 1.0 0.9       LIVR:   Recent Labs     11/01/22 0151   AST 8*   ALT 11       PT/INR:   Recent Labs     11/01/22 0151   PROT 8.8*   INR 1.13       APTT:   Recent Labs     11/01/22 0151   APTT 28.4       ABG: No results for input(s): PHART, IMM5GCR, PO2ART in the last 72 hours.   Consults (if GI or Nephrology- which group?)-

## 2022-11-01 NOTE — PLAN OF CARE
Problem: Discharge Planning  Goal: Discharge to home or other facility with appropriate resources  11/1/2022 1843 by Cl Nice RN  Outcome: Progressing     Problem: Pain  Goal: Verbalizes/displays adequate comfort level or baseline comfort level  11/1/2022 1843 by Cl Nice RN  Outcome: Progressing    Problem: Skin/Tissue Integrity  Goal: Absence of new skin breakdown  Description: 1. Monitor for areas of redness and/or skin breakdown  2. Assess vascular access sites hourly  3. Every 4-6 hours minimum:  Change oxygen saturation probe site  4. Every 4-6 hours:  If on nasal continuous positive airway pressure, respiratory therapy assess nares and determine need for appliance change or resting period.   11/1/2022 1843 by Cl Nice RN  Outcome: Progressing    Problem: Safety - Adult  Goal: Free from fall injury  11/1/2022 1843 by Cl Nice RN  Outcome: Progressing     Problem: Chronic Conditions and Co-morbidities  Goal: Patient's chronic conditions and co-morbidity symptoms are monitored and maintained or improved  Outcome: Progressing

## 2022-11-02 LAB
ANION GAP SERPL CALCULATED.3IONS-SCNC: 11 MMOL/L (ref 3–16)
ANION GAP SERPL CALCULATED.3IONS-SCNC: 12 MMOL/L (ref 3–16)
ANION GAP SERPL CALCULATED.3IONS-SCNC: 14 MMOL/L (ref 3–16)
BASOPHILS ABSOLUTE: 0 K/UL (ref 0–0.2)
BASOPHILS RELATIVE PERCENT: 0.2 %
BUN BLDV-MCNC: 11 MG/DL (ref 7–20)
BUN BLDV-MCNC: 12 MG/DL (ref 7–20)
BUN BLDV-MCNC: 21 MG/DL (ref 7–20)
CALCIUM SERPL-MCNC: 8.2 MG/DL (ref 8.3–10.6)
CALCIUM SERPL-MCNC: 8.4 MG/DL (ref 8.3–10.6)
CALCIUM SERPL-MCNC: 8.4 MG/DL (ref 8.3–10.6)
CHLORIDE BLD-SCNC: 102 MMOL/L (ref 99–110)
CHLORIDE BLD-SCNC: 102 MMOL/L (ref 99–110)
CHLORIDE BLD-SCNC: 105 MMOL/L (ref 99–110)
CO2: 16 MMOL/L (ref 21–32)
CO2: 18 MMOL/L (ref 21–32)
CO2: 19 MMOL/L (ref 21–32)
CREAT SERPL-MCNC: 0.6 MG/DL (ref 0.8–1.3)
CREAT SERPL-MCNC: 0.7 MG/DL (ref 0.8–1.3)
CREAT SERPL-MCNC: 0.8 MG/DL (ref 0.8–1.3)
EOSINOPHILS ABSOLUTE: 0 K/UL (ref 0–0.6)
EOSINOPHILS RELATIVE PERCENT: 0 %
GFR SERPL CREATININE-BSD FRML MDRD: >60 ML/MIN/{1.73_M2}
GLUCOSE BLD-MCNC: 122 MG/DL (ref 70–99)
GLUCOSE BLD-MCNC: 132 MG/DL (ref 70–99)
GLUCOSE BLD-MCNC: 140 MG/DL (ref 70–99)
GLUCOSE BLD-MCNC: 148 MG/DL (ref 70–99)
GLUCOSE BLD-MCNC: 152 MG/DL (ref 70–99)
GLUCOSE BLD-MCNC: 154 MG/DL (ref 70–99)
GLUCOSE BLD-MCNC: 158 MG/DL (ref 70–99)
GLUCOSE BLD-MCNC: 159 MG/DL (ref 70–99)
GLUCOSE BLD-MCNC: 162 MG/DL (ref 70–99)
GLUCOSE BLD-MCNC: 163 MG/DL (ref 70–99)
GLUCOSE BLD-MCNC: 163 MG/DL (ref 70–99)
GLUCOSE BLD-MCNC: 179 MG/DL (ref 70–99)
GLUCOSE BLD-MCNC: 184 MG/DL (ref 70–99)
GLUCOSE BLD-MCNC: 186 MG/DL (ref 70–99)
GLUCOSE BLD-MCNC: 206 MG/DL (ref 70–99)
GLUCOSE BLD-MCNC: 214 MG/DL (ref 70–99)
HCT VFR BLD CALC: 40.2 % (ref 40.5–52.5)
HEMOGLOBIN: 13.4 G/DL (ref 13.5–17.5)
LYMPHOCYTES ABSOLUTE: 0.9 K/UL (ref 1–5.1)
LYMPHOCYTES RELATIVE PERCENT: 9.7 %
MAGNESIUM: 1.8 MG/DL (ref 1.8–2.4)
MAGNESIUM: 1.9 MG/DL (ref 1.8–2.4)
MAGNESIUM: 2 MG/DL (ref 1.8–2.4)
MCH RBC QN AUTO: 30 PG (ref 26–34)
MCHC RBC AUTO-ENTMCNC: 33.4 G/DL (ref 31–36)
MCV RBC AUTO: 90 FL (ref 80–100)
MONOCYTES ABSOLUTE: 0.8 K/UL (ref 0–1.3)
MONOCYTES RELATIVE PERCENT: 8.4 %
NEUTROPHILS ABSOLUTE: 7.8 K/UL (ref 1.7–7.7)
NEUTROPHILS RELATIVE PERCENT: 81.7 %
PDW BLD-RTO: 13.4 % (ref 12.4–15.4)
PERFORMED ON: ABNORMAL
PHOSPHORUS: 1.7 MG/DL (ref 2.5–4.9)
PHOSPHORUS: 1.7 MG/DL (ref 2.5–4.9)
PHOSPHORUS: 1.9 MG/DL (ref 2.5–4.9)
PLATELET # BLD: 263 K/UL (ref 135–450)
PMV BLD AUTO: 7.3 FL (ref 5–10.5)
POTASSIUM SERPL-SCNC: 3.8 MMOL/L (ref 3.5–5.1)
POTASSIUM SERPL-SCNC: 4 MMOL/L (ref 3.5–5.1)
POTASSIUM SERPL-SCNC: 4.3 MMOL/L (ref 3.5–5.1)
RBC # BLD: 4.47 M/UL (ref 4.2–5.9)
REPORT: NORMAL
SODIUM BLD-SCNC: 132 MMOL/L (ref 136–145)
SODIUM BLD-SCNC: 132 MMOL/L (ref 136–145)
SODIUM BLD-SCNC: 135 MMOL/L (ref 136–145)
WBC # BLD: 9.5 K/UL (ref 4–11)

## 2022-11-02 PROCEDURE — 83735 ASSAY OF MAGNESIUM: CPT

## 2022-11-02 PROCEDURE — 6370000000 HC RX 637 (ALT 250 FOR IP): Performed by: INTERNAL MEDICINE

## 2022-11-02 PROCEDURE — 6360000002 HC RX W HCPCS: Performed by: EMERGENCY MEDICINE

## 2022-11-02 PROCEDURE — 2580000003 HC RX 258: Performed by: EMERGENCY MEDICINE

## 2022-11-02 PROCEDURE — 80048 BASIC METABOLIC PNL TOTAL CA: CPT

## 2022-11-02 PROCEDURE — 2580000003 HC RX 258: Performed by: INTERNAL MEDICINE

## 2022-11-02 PROCEDURE — 85025 COMPLETE CBC W/AUTO DIFF WBC: CPT

## 2022-11-02 PROCEDURE — 36415 COLL VENOUS BLD VENIPUNCTURE: CPT

## 2022-11-02 PROCEDURE — 6360000002 HC RX W HCPCS: Performed by: INTERNAL MEDICINE

## 2022-11-02 PROCEDURE — 84100 ASSAY OF PHOSPHORUS: CPT

## 2022-11-02 PROCEDURE — 2000000000 HC ICU R&B

## 2022-11-02 PROCEDURE — 2500000003 HC RX 250 WO HCPCS: Performed by: EMERGENCY MEDICINE

## 2022-11-02 RX ORDER — INSULIN LISPRO 100 [IU]/ML
0-4 INJECTION, SOLUTION INTRAVENOUS; SUBCUTANEOUS NIGHTLY
Status: DISCONTINUED | OUTPATIENT
Start: 2022-11-02 | End: 2022-11-03 | Stop reason: HOSPADM

## 2022-11-02 RX ORDER — INSULIN LISPRO 100 [IU]/ML
0-8 INJECTION, SOLUTION INTRAVENOUS; SUBCUTANEOUS
Status: DISCONTINUED | OUTPATIENT
Start: 2022-11-02 | End: 2022-11-03 | Stop reason: HOSPADM

## 2022-11-02 RX ORDER — DEXTROSE MONOHYDRATE 100 MG/ML
INJECTION, SOLUTION INTRAVENOUS CONTINUOUS PRN
Status: DISCONTINUED | OUTPATIENT
Start: 2022-11-02 | End: 2022-11-03 | Stop reason: HOSPADM

## 2022-11-02 RX ORDER — INSULIN GLARGINE 100 [IU]/ML
15 INJECTION, SOLUTION SUBCUTANEOUS NIGHTLY
Status: DISCONTINUED | OUTPATIENT
Start: 2022-11-02 | End: 2022-11-03 | Stop reason: HOSPADM

## 2022-11-02 RX ADMIN — AMPICILLIN SODIUM AND SULBACTAM SODIUM 3000 MG: 2; 1 INJECTION, POWDER, FOR SOLUTION INTRAMUSCULAR; INTRAVENOUS at 18:16

## 2022-11-02 RX ADMIN — SODIUM PHOSPHATE, MONOBASIC, MONOHYDRATE 15 MMOL: 276; 142 INJECTION, SOLUTION INTRAVENOUS at 05:01

## 2022-11-02 RX ADMIN — ENOXAPARIN SODIUM 40 MG: 100 INJECTION SUBCUTANEOUS at 20:28

## 2022-11-02 RX ADMIN — POTASSIUM CHLORIDE 10 MEQ: 7.46 INJECTION, SOLUTION INTRAVENOUS at 00:30

## 2022-11-02 RX ADMIN — SODIUM PHOSPHATE, MONOBASIC, MONOHYDRATE 15 MMOL: 276; 142 INJECTION, SOLUTION INTRAVENOUS at 00:32

## 2022-11-02 RX ADMIN — INSULIN GLARGINE 15 UNITS: 100 INJECTION, SOLUTION SUBCUTANEOUS at 16:05

## 2022-11-02 RX ADMIN — POTASSIUM CHLORIDE 10 MEQ: 7.46 INJECTION, SOLUTION INTRAVENOUS at 06:53

## 2022-11-02 RX ADMIN — ASPIRIN 81 MG: 81 TABLET, COATED ORAL at 18:18

## 2022-11-02 RX ADMIN — POTASSIUM CHLORIDE 10 MEQ: 7.46 INJECTION, SOLUTION INTRAVENOUS at 14:24

## 2022-11-02 RX ADMIN — POTASSIUM CHLORIDE 10 MEQ: 7.46 INJECTION, SOLUTION INTRAVENOUS at 03:47

## 2022-11-02 RX ADMIN — SODIUM PHOSPHATE, MONOBASIC, MONOHYDRATE 10 MMOL: 276; 142 INJECTION, SOLUTION INTRAVENOUS at 12:05

## 2022-11-02 RX ADMIN — POTASSIUM CHLORIDE 10 MEQ: 7.46 INJECTION, SOLUTION INTRAVENOUS at 10:53

## 2022-11-02 RX ADMIN — AMPICILLIN SODIUM AND SULBACTAM SODIUM 3000 MG: 2; 1 INJECTION, POWDER, FOR SOLUTION INTRAMUSCULAR; INTRAVENOUS at 22:55

## 2022-11-02 RX ADMIN — AMPICILLIN SODIUM AND SULBACTAM SODIUM 3000 MG: 2; 1 INJECTION, POWDER, FOR SOLUTION INTRAMUSCULAR; INTRAVENOUS at 13:27

## 2022-11-02 RX ADMIN — POTASSIUM CHLORIDE 10 MEQ: 7.46 INJECTION, SOLUTION INTRAVENOUS at 05:00

## 2022-11-02 RX ADMIN — POTASSIUM CHLORIDE 10 MEQ: 7.46 INJECTION, SOLUTION INTRAVENOUS at 12:07

## 2022-11-02 RX ADMIN — AMPICILLIN SODIUM AND SULBACTAM SODIUM 3000 MG: 2; 1 INJECTION, POWDER, FOR SOLUTION INTRAMUSCULAR; INTRAVENOUS at 06:03

## 2022-11-02 RX ADMIN — DEXTROSE AND SODIUM CHLORIDE: 5; 450 INJECTION, SOLUTION INTRAVENOUS at 13:30

## 2022-11-02 RX ADMIN — SODIUM CHLORIDE 0.07 UNITS/KG/HR: 9 INJECTION, SOLUTION INTRAVENOUS at 02:09

## 2022-11-02 RX ADMIN — SODIUM CHLORIDE 0.05 UNITS/KG/HR: 9 INJECTION, SOLUTION INTRAVENOUS at 00:23

## 2022-11-02 RX ADMIN — CHLORHEXIDINE GLUCONATE 15 ML: 1.2 RINSE ORAL at 20:29

## 2022-11-02 RX ADMIN — ATORVASTATIN CALCIUM 20 MG: 10 TABLET, FILM COATED ORAL at 18:18

## 2022-11-02 RX ADMIN — Medication 1 CAPSULE: at 18:18

## 2022-11-02 NOTE — CARE COORDINATION
Writer met with pt's wife Jane Ramirez at bedside, she has concerns about what pt should be on for his diabetes at home, she mentioned two different injectables not insulin that PCP has prescribed for pt, but they have not been filled due to insurance. Jane Ramirez thinks PCP has been able to handle pt's diabetes but did not realize pt had not taken his injectable in over 2months, had only taken his PO meds. Jane Ramirez is motivated to make sure pt gets all his prescribed medications. Writer explained Dr Rosina Vázquez or whomever is discharging pt will decide final meds and if new Rx are needed, can be sent to [address] [place] [address] and they can work with insurance if prior [facility] needed. Tarah verbalized understanding. Writer called [facility]) Endocrinology, they do not accept referrals from hospitals but do from PCPs and can accept new patients at their [facility] location, nothing closer to [address]. CM will follow for final medrec, new Rx, and if family wants to get established with Endocrinology.   AAMIR Gupta-RN

## 2022-11-02 NOTE — PROGRESS NOTES
In regards to pt insulin drip:     MD paged @ 1949:  Per MAR guidlelines, insulin drip is running at . 02ml/hr and sugars are WDL so cannot increase the drip. His labs are not improving, could we increase the fluids to eventually increase his insulin drip to help correct labs? MD: andrez try 200    8602: Can we start this insulin order fresh? It was put in incorrectly and now he is hardly getting insulin because sugars are WDL and labs are not improving. MD: yeah reorder it. Started insulin drip at 0.05units/kg/hr since blood sugar was <250.

## 2022-11-02 NOTE — PROGRESS NOTES
Shift: [id number]    Admitting diagnosis: DKA TYPE 2    Presentation to hospital:  HIGH BLOOD SUGAR LEVEL, DROWSY    Surgery: NO    Nursing assessment at handoff  stable    Emergency Contact/POA: [facility]  Family updated: YES    Most recent vitals: /81   Pulse 86   Temp 98.7 °F (37.1 °C) (Bladder)   Resp 14   Ht 6' (1.829 m)   Wt 177 lb (80.3 kg)   SpO2 95%   BMI 24.01 kg/m²      Rhythm:NSR        Increased O2 requirements: NO, ROOM AIR    Admission weight Weight: 200 lb (90.7 kg)  Today's weight   Wt Readings from Last 1 Encounters:   11/02/22 177 lb (80.3 kg)         UOP >30ml/hr: YES    Rouse need assessed each shift: YES  Restraints:NO:  Lines/Drains  LDA Insertion Date Discontinued Date Dressing Changes   PIV  11/1/22     TLC       Arterial       Rouse  11/1/22 11/2/22 0900    Vas Cath      ETT       Surgical drains        Night Shift Hospitalist Interventions    Problem(Brief) Date Time Intervention Physician contacted                                               Drip rates at handoff:    dextrose      dextrose 5 % and 0.45 % NaCl Stopped (11/02/22 1609)       Hospital Course Daily Updates:  Admit Day# 0  -3L NSS BOLUSES  -20mEQ K GIVEN  -93.5 TEMP UPON ADMISSION, BEAR TERRANCEGGER. Now off, normothermic. -INSULIN GTTS   - Remains lethargic but arousable. Disoriented to situation/place, oriented time and person.      Admit day #1:   -200ml d51/2  -gap closed  -lethargic  -insulin drip and q4 labs    Day 1: Days  -potassium, NaPhos replacements  -D/C insulin gtt   -Lanutus 15 mg @ 1630  -D/C IVF      Lab Data:   CBC:   Recent Labs     11/01/22  0151 11/02/22  0242   WBC 11.1* 9.5   HGB 17.5 13.4*   HCT 55.1* 40.2*   MCV 91.8 90.0    263       BMP:    Recent Labs     11/02/22  0914 11/02/22  1315   * 132*   K 3.8 4.3   CO2 18* 19*   BUN 12 11   CREATININE 0.7* 0.6*       LIVR:   Recent Labs     11/01/22  0151   AST 8*   ALT 11       PT/INR:   Recent Labs     11/01/22 0151   PROT 8.8*   INR 1. 13       APTT:   Recent Labs     11/01/22  0151   APTT 28.4       ABG: No results for input(s): PHART, NPW2IBJ, PO2ART in the last 72 hours.   Consults (if GI or Nephrology- which group?)-     Electronically signed by William Buenrostro RN on 11/2/2022 at 5:43 PM

## 2022-11-02 NOTE — PLAN OF CARE
Problem: Pain  Goal: Verbalizes/displays adequate comfort level or baseline comfort level  11/2/2022 0727 by Gagan Mark RN  Outcome: Progressing  Flowsheets (Taken 11/2/2022 0400)  Verbalizes/displays adequate comfort level or baseline comfort level: Encourage patient to monitor pain and request assistance  11/1/2022 1843 by Bernardo Rivera RN  Outcome: Progressing     Problem: Discharge Planning  Goal: Discharge to home or other facility with appropriate resources  11/2/2022 0727 by Gagan Mark RN  Outcome: Progressing  11/1/2022 1843 by Bernardo Rivera RN  Outcome: Progressing     Problem: Skin/Tissue Integrity  Goal: Absence of new skin breakdown  Description: 1. Monitor for areas of redness and/or skin breakdown  2. Assess vascular access sites hourly  3. Every 4-6 hours minimum:  Change oxygen saturation probe site  4. Every 4-6 hours:  If on nasal continuous positive airway pressure, respiratory therapy assess nares and determine need for appliance change or resting period.   11/2/2022 0727 by Gagan Mark RN  Outcome: Progressing  11/1/2022 1843 by Bernadro Rivera RN  Outcome: Progressing     Problem: Safety - Adult  Goal: Free from fall injury  11/2/2022 0727 by Gagan Mark RN  Outcome: Progressing  11/1/2022 1843 by Bernardo Rivera RN  Outcome: Progressing     Problem: Chronic Conditions and Co-morbidities  Goal: Patient's chronic conditions and co-morbidity symptoms are monitored and maintained or improved  11/2/2022 0727 by Gagan Mark RN  Outcome: Progressing  11/1/2022 1843 by Bernardo Rivera RN  Outcome: Progressing

## 2022-11-02 NOTE — DISCHARGE INSTRUCTIONS
[facility] has an office in [facility] and can accept new patients. Your PCP would need to make referral, and then you can call the office at [phone] to schedule appointment.

## 2022-11-02 NOTE — PROGRESS NOTES
Shift: [id number]    Admitting diagnosis: DKA TYPE 2    Presentation to hospital:  HIGH BLOOD SUGAR LEVEL, DROWSY    Surgery: NO    Nursing assessment at handoff  stable    Emergency Contact/POA: [facility]  Family updated: YES    Most recent vitals: /73   Pulse 94   Temp 99 °F (37.2 °C) (Bladder)   Resp 16   Ht 6' (1.829 m)   Wt 177 lb (80.3 kg)   SpO2 99%   BMI 24.01 kg/m²      Rhythm:SINUS TACHY        Increased O2 requirements: NO, ROOM AIR    Admission weight Weight: 200 lb (90.7 kg)  Today's weight   Wt Readings from Last 1 Encounters:   11/02/22 177 lb (80.3 kg)         UOP >30ml/hr: YES    Rouse need assessed each shift: YES  Restraints:NO:  Lines/Drains  LDA Insertion Date Discontinued Date Dressing Changes   PIV  11/1/22     TLC       Arterial       Rouse  11/1/22     Vas Cath      ETT       Surgical drains        Night Shift Hospitalist Interventions    Problem(Brief) Date Time Intervention Physician contacted                                               Drip rates at handoff:    insulin 0.025 Units/kg/hr (11/02/22 0656)    sodium chloride Stopped (11/01/22 1000)    dextrose 5 % and 0.45 % NaCl 200 mL/hr at 11/01/22 2100       Hospital Course Daily Updates:  Admit Day# 0  -3L NSS BOLUSES  -20mEQ K GIVEN  -93.5 TEMP UPON ADMISSION, BEAR HUGGER. Now off, normothermic. -INSULIN GTTS   - Remains lethargic but arousable. Disoriented to situation/place, oriented time and person.      Admit day #1:   -200ml d51/2  -gap closed  -lethargic  -insulin drip and q4 labs      Lab Data:   CBC:   Recent Labs     11/01/22  0151 11/02/22  0242   WBC 11.1* 9.5   HGB 17.5 13.4*   HCT 55.1* 40.2*   MCV 91.8 90.0    263       BMP:    Recent Labs     11/01/22  2240 11/02/22  0242    132*   K 4.5 4.0   CO2 13* 16*   BUN 23* 21*   CREATININE 0.8 0.8       LIVR:   Recent Labs     11/01/22  0151   AST 8*   ALT 11       PT/INR:   Recent Labs     11/01/22  0151   PROT 8.8*   INR 1.13       APTT:   Recent Labs 11/01/22  0151   APTT 28.4       ABG: No results for input(s): PHART, GNU0RHY, PO2ART in the last 72 hours.   Consults (if GI or Nephrology- which group?)-

## 2022-11-02 NOTE — PROGRESS NOTES
Hospitalist Progress Note    Date of Admission: 11/1/2022    Chief Complaint: N/V    Hospital Course:   Patient with PMH of DM2, HTN, HLD arrived via EMS after multiple episodes of nausea and vomiting, lethargy and fatigue. Patient underwent multiple teeth extraction on Friday. He was discharged on Norco and clindamycin. He began to have multiple bouts of nausea and vomiting. Stopped the Norco because he was getting loopy and instead gave him ibuprofen with good pain relief. About 3 weeks back he was taken off of exenatide, and there was plans of starting him on Tirzepatide inj which he has not started yet. He did not take his glipizide and metformin over the weekend and has had decreased p.o. intake. Has had diabetes for over 10 years but never been in DKA. His last A1c on 10/11/2022 was 11.5. Subjective: DKA resolved. BS stable. Not much appetite. Drinking sips of ice water. Nausea and vomiting have improved. Labs:   Recent Labs     11/01/22  0151 11/02/22  0242   WBC 11.1* 9.5   HGB 17.5 13.4*   HCT 55.1* 40.2*    263     Recent Labs     11/01/22  1850 11/01/22  2240 11/02/22  0242    136 132*   K 4.4 4.5 4.0    104 102   CO2 14* 13* 16*   BUN 28* 23* 21*   CREATININE 0.9 0.8 0.8   CALCIUM 8.2* 8.4 8.4   PHOS 2.3* 2.0* 1.7*     Recent Labs     11/01/22  0151   AST 8*   ALT 11   BILITOT 0.4   ALKPHOS 128     Recent Labs     11/01/22 0151   INR 1.13       Physical Exam Performed:    /76   Pulse 92   Temp 98.7 °F (37.1 °C) (Bladder)   Resp 16   Ht 6' (1.829 m)   Wt 177 lb (80.3 kg)   SpO2 99%   BMI 24.01 kg/m²   General appearance:  No apparent distress, appears stated age and cooperative. HEENT:  Edentulous, erythema around the gumline both in the mandible and maxillary, multiple sutures present, no fluctuant swelling in the gums, no purulent discharge. Ecchymosis around the right eye. Pupils equal, round, and reactive to light.  Conjunctivae/corneas clear.  Neck:  Supple, no jugular venous distention. Trachea midline with full range of motion. Respiratory:  Normal respiratory effort. Clear to auscultation, bilaterally without Rales/Wheezes/Rhonchi. Cardiovascular:  Regular rate and rhythm with normal S1/S2 without murmurs, rubs or gallops. Abdomen:  Soft, non-tender, non-distended with normal bowel sounds. Musculoskelatal:  No clubbing, cyanosis or edema bilaterally. Full range of motion without deformity. Neurologic:  Neurovascularly intact without any focal sensory/motor deficits. Cranial nerves: II-XII intact, grossly non-focal.  Psychiatric:  Alert and oriented, thought content appropriate, normal insight  Skin:  Skin color, texture, turgor normal.  No rashes or lesions. Capillary Refill:  Brisk,< 3 seconds   Peripheral Pulses:  +2 palpable, equal bilaterally       Consults:    IP CONSULT TO HOSPITALIST  IP CONSULT TO DIABETES EDUCATOR    Assessment/Plan:    Active Hospital Problems    Diagnosis     DKA, type 2, not at goal [facility]) [E11.10]      Priority: Medium    History of tooth extraction, unspecified edentulism [K08.409]      Priority: Medium    High anion gap metabolic acidosis [V44.38]      Priority: Medium    Hypertension associated with diabetes ([facility] [E11.59, I15.2]     Hyperlipidemia associated with type 2 diabetes mellitus ([facility] [E11.69, E78.5]      Diabetic ketoacidosis  Type II diabetic x10 years, last A1c 11.5 in October  Trigger-recent oral surgery for multiple teeth extraction, could not tolerate p.o. diabetic meds  -Resolved with IVF and DKA protocol.   -Will transition to a basal bolus Insulin regimen while here;  -Advance diet as tolerated  -Anticipate resumption of his PO regimen of Jardiance, Glipizide and Metformin at [place].  -He is unable to afford the recently prescribed injectable Hillary Kevon) prescribed by his PCP.     Abnormal BCx  BCx showed 1/2 CoNeg staph; if 2nd set remains negative this is most consistent with contamination    History of teeth extraction, unspecified edentulism  -CT showed no evidence of obvious abscess, most likely postoperative changes. Clinically could not appreciate any abscess  -We will cover empirically with IV Unasyn for now, can DC pending clinical course    HTN-controlled, holding lisinopril due to MOISÉS, monitor BP    HLD-resume statin    DVT Prophylaxis: Lovenox  Diet: ADULT DIET; Regular; 4 carb choices (60 gm/meal)  Code Status: Full Code  PT/OT Eval Status: NA    Dispo - 1-2 days. OK to leave ICU.     Appropriate for A1 Discharge Unit: Sandi Garnica MD

## 2022-11-02 NOTE — PLAN OF CARE
Problem: Discharge Planning  Goal: Discharge to home or other facility with appropriate resources  11/2/2022 1653 by Rochelle Sarah RN  Outcome: Progressing     Problem: Pain  Goal: Verbalizes/displays adequate comfort level or baseline comfort level  11/2/2022 1653 by Rochelle Sarah RN  Outcome: Progressing     Problem: Skin/Tissue Integrity  Goal: Absence of new skin breakdown  Description: 1. Monitor for areas of redness and/or skin breakdown  2. Assess vascular access sites hourly  3. Every 4-6 hours minimum:  Change oxygen saturation probe site  4. Every 4-6 hours:  If on nasal continuous positive airway pressure, respiratory therapy assess nares and determine need for appliance change or resting period.   11/2/2022 1653 by Rochelle Sarah RN  Outcome: Progressing     Problem: Safety - Adult  Goal: Free from fall injury  11/2/2022 1653 by Rochelle Sarah RN  Outcome: Progressing     Problem: Chronic Conditions and Co-morbidities  Goal: Patient's chronic conditions and co-morbidity symptoms are monitored and maintained or improved  11/2/2022 1653 by Rochelle Sarah RN  Outcome: Progressing     Problem: Metabolic/Fluid and Electrolytes - Adult  Goal: Electrolytes maintained within normal limits  Outcome: Progressing     Problem: Metabolic/Fluid and Electrolytes - Adult  Goal: Hemodynamic stability and optimal renal function maintained  Outcome: Progressing     Problem: Metabolic/Fluid and Electrolytes - Adult  Goal: Glucose maintained within prescribed range  Outcome: Progressing

## 2022-11-03 VITALS
HEART RATE: 92 BPM | HEIGHT: 72 IN | TEMPERATURE: 98.2 F | SYSTOLIC BLOOD PRESSURE: 138 MMHG | RESPIRATION RATE: 18 BRPM | WEIGHT: 188.71 LBS | OXYGEN SATURATION: 98 % | BODY MASS INDEX: 25.56 KG/M2 | DIASTOLIC BLOOD PRESSURE: 78 MMHG

## 2022-11-03 LAB
ANION GAP SERPL CALCULATED.3IONS-SCNC: 16 MMOL/L (ref 3–16)
BUN BLDV-MCNC: 10 MG/DL (ref 7–20)
CALCIUM SERPL-MCNC: 8.5 MG/DL (ref 8.3–10.6)
CHLORIDE BLD-SCNC: 103 MMOL/L (ref 99–110)
CO2: 18 MMOL/L (ref 21–32)
CREAT SERPL-MCNC: 0.6 MG/DL (ref 0.8–1.3)
GFR SERPL CREATININE-BSD FRML MDRD: >60 ML/MIN/{1.73_M2}
GLUCOSE BLD-MCNC: 141 MG/DL (ref 70–99)
GLUCOSE BLD-MCNC: 183 MG/DL (ref 70–99)
GLUCOSE BLD-MCNC: 185 MG/DL (ref 70–99)
PERFORMED ON: ABNORMAL
PERFORMED ON: ABNORMAL
POTASSIUM REFLEX MAGNESIUM: 3.9 MMOL/L (ref 3.5–5.1)
SODIUM BLD-SCNC: 137 MMOL/L (ref 136–145)

## 2022-11-03 PROCEDURE — 36415 COLL VENOUS BLD VENIPUNCTURE: CPT

## 2022-11-03 PROCEDURE — 6360000002 HC RX W HCPCS: Performed by: INTERNAL MEDICINE

## 2022-11-03 PROCEDURE — 80048 BASIC METABOLIC PNL TOTAL CA: CPT

## 2022-11-03 PROCEDURE — 6370000000 HC RX 637 (ALT 250 FOR IP): Performed by: INTERNAL MEDICINE

## 2022-11-03 PROCEDURE — 2580000003 HC RX 258: Performed by: INTERNAL MEDICINE

## 2022-11-03 RX ORDER — LACTOBACILLUS RHAMNOSUS GG 10B CELL
1 CAPSULE ORAL 2 TIMES DAILY WITH MEALS
Qty: 14 CAPSULE | Refills: 0 | Status: SHIPPED | OUTPATIENT
Start: 2022-11-03 | End: 2022-11-10

## 2022-11-03 RX ORDER — AMOXICILLIN AND CLAVULANATE POTASSIUM 875; 125 MG/1; MG/1
1 TABLET, FILM COATED ORAL 2 TIMES DAILY
Qty: 10 TABLET | Refills: 0 | Status: SHIPPED | OUTPATIENT
Start: 2022-11-03 | End: 2022-11-08

## 2022-11-03 RX ORDER — IBUPROFEN 600 MG/1
600 TABLET ORAL EVERY 6 HOURS PRN
Status: DISCONTINUED | OUTPATIENT
Start: 2022-11-03 | End: 2022-11-03 | Stop reason: HOSPADM

## 2022-11-03 RX ORDER — INSULIN GLARGINE 100 [IU]/ML
15 INJECTION, SOLUTION SUBCUTANEOUS NIGHTLY
Qty: 5 ADJUSTABLE DOSE PRE-FILLED PEN SYRINGE | Refills: 0 | Status: SHIPPED | OUTPATIENT
Start: 2022-11-03

## 2022-11-03 RX ADMIN — ENOXAPARIN SODIUM 40 MG: 100 INJECTION SUBCUTANEOUS at 08:08

## 2022-11-03 RX ADMIN — IBUPROFEN 600 MG: 600 TABLET ORAL at 10:39

## 2022-11-03 RX ADMIN — AMPICILLIN SODIUM AND SULBACTAM SODIUM 3000 MG: 2; 1 INJECTION, POWDER, FOR SOLUTION INTRAMUSCULAR; INTRAVENOUS at 12:03

## 2022-11-03 RX ADMIN — ATORVASTATIN CALCIUM 20 MG: 10 TABLET, FILM COATED ORAL at 08:07

## 2022-11-03 RX ADMIN — ASPIRIN 81 MG: 81 TABLET, COATED ORAL at 08:07

## 2022-11-03 RX ADMIN — AMPICILLIN SODIUM AND SULBACTAM SODIUM 3000 MG: 2; 1 INJECTION, POWDER, FOR SOLUTION INTRAMUSCULAR; INTRAVENOUS at 05:01

## 2022-11-03 RX ADMIN — Medication 1 CAPSULE: at 08:07

## 2022-11-03 ASSESSMENT — PAIN SCALES - GENERAL
PAINLEVEL_OUTOF10: 6
PAINLEVEL_OUTOF10: 7

## 2022-11-03 ASSESSMENT — PAIN DESCRIPTION - LOCATION: LOCATION: MOUTH

## 2022-11-03 ASSESSMENT — PAIN DESCRIPTION - ORIENTATION: ORIENTATION: INNER

## 2022-11-03 NOTE — PROGRESS NOTES
Pt ok for discharge per MD. Discharge instructions given. Prescriptions filled by outpatient pharmacy. IVx3 removed. No questions or concerns at this time. Transported by wheelchair to personal car.

## 2022-11-03 NOTE — DISCHARGE SUMMARY
Hospital Medicine Discharge Summary    Patient: Dmitri Collins     Age: 74 y.o. Gender: male  : 1955   MRN: [id number]  Code status: Full code    Admit Date: 2022   Discharge Date: 11/3/2022    Disposition:  Home    Condition at Discharge: Stable    Primary Care Provider: Roxanna Duran DO    Admitting Physician: Kyle Frazier MD  Discharge Physician: Salas Saba MD     Discharge Diagnoses: Active Hospital Problems    Diagnosis     DKA, type 2, not at goal [facility]) [E11.10]      Priority: Medium    History of tooth extraction, unspecified edentulism [K08.409]      Priority: Medium    High anion gap metabolic acidosis [Q14.14]      Priority: Medium    Hypertension associated with diabetes ([facility] [E11.59, I15.2]     Hyperlipidemia associated with type 2 diabetes mellitus ([facility] [address] [E11.69, E78.5]        Hospital Course:   Patient with PMH of DM2, HTN, HLD arrived via EMS after multiple episodes of nausea and vomiting, lethargy and fatigue. Patient underwent multiple teeth extraction on Friday. He was discharged on Norco and clindamycin. He began to have multiple bouts of nausea and vomiting. Stopped the Norco because he was getting loopy and instead gave him ibuprofen with good pain relief. About 3 weeks back he was taken off of exenatide, and there was plans of starting him on Tirzepatide inj which he has not started yet. He did not take his glipizide and metformin over the weekend and has had decreased p.o. intake. Has had diabetes for over 10 years but never been in DKA. His last A1c on 10/11/2022 was 11.5.     Assessment/Plan:    Diabetic ketoacidosis  Type II diabetic x10 years, last A1c 11.5 in October  Trigger-recent oral surgery for multiple teeth extraction, could not tolerate p.o. diabetic meds  -Resolved with IVF and DKA protocol.   -He is unable to afford the recently prescribed injectable Wendy Harp) prescribed by his PCP.  -Discussed possibility of resuming PO regimen of Jardiance, Glipizide and Metformin vs changing to basal Insulin regimen.   -He prefers to transition to basal Insulin at this point. Continue Metformin and Jardiance. Abnormal BCx  BCx showed 1/2 CoNeg staph; this is most consistent with contamination    History of teeth extraction, unspecified edentulism  -CT showed no evidence of obvious abscess, most likely postoperative changes. Clinically could not appreciate any abscess  -We will cover empirically with Abx    HTN-controlled, resume home regimen. Exam:   /78   Pulse 92   Temp 98.2 °F (36.8 °C) (Oral)   Resp 18   Ht 6' (1.829 m)   Wt 188 lb 11.4 oz (85.6 kg)   SpO2 98%   BMI 25.59 kg/m²     General appearance:  No apparent distress, appears stated age and cooperative. HEENT:  Edentulous, erythema around the gumline both in the mandible and maxillary, multiple sutures present, no fluctuant swelling in the gums, no purulent discharge. Ecchymosis around the right eye. Pupils equal, round, and reactive to light. Conjunctivae/corneas clear. Neck:  Supple, no jugular venous distention. Trachea midline with full range of motion. Respiratory:  Normal respiratory effort. Clear to auscultation, bilaterally without Rales/Wheezes/Rhonchi. Cardiovascular:  Regular rate and rhythm with normal S1/S2 without murmurs, rubs or gallops. Abdomen:  Soft, non-tender, non-distended with normal bowel sounds. Musculoskelatal:  No clubbing, cyanosis or edema bilaterally. Full range of motion without deformity. Neurologic:  Neurovascularly intact without any focal sensory/motor deficits. Cranial nerves: II-XII intact, grossly non-focal.  Psychiatric:  Alert and oriented, thought content appropriate, normal insight  Skin:  Skin color, texture, turgor normal.  No rashes or lesions. Capillary Refill:  Brisk,< 3 seconds   Peripheral Pulses:  +2 palpable, equal bilaterally     Patient Discharge Instructions: Follow up:  1.   Primary Care Provider Ashley Jacques, DO in the next 1-2 weeks. Discharge Medications:   Discharge Medication List as of 11/3/2022  3:12 PM        START taking these medications    Details   amoxicillin-clavulanate (AUGMENTIN) 875-125 MG per tablet Take 1 tablet by mouth 2 times daily for 5 days, Disp-10 tablet, R-0Normal      insulin glargine (LANTUS SOLOSTAR) 100 UNIT/ML injection pen Inject 15 Units into the skin nightly, Disp-5 Adjustable Dose Pre-filled Pen Syringe, R-0Normal      lactobacillus (CULTURELLE) capsule Take 1 capsule by mouth 2 times daily (with meals) for 7 days, Disp-14 capsule, R-0Normal           Discharge Medication List as of 11/3/2022  3:12 PM        Discharge Medication List as of 11/3/2022  3:12 PM        CONTINUE these medications which have NOT CHANGED    Details   glucose monitoring (FREESTYLE FREEDOM) kit DAILY Starting Tue 10/11/2022, Disp-1 kit, R-0, Normal      Lancets MISC DAILY Starting Tue 10/11/2022, Disp-100 each, R-5, Normal      blood glucose monitor strips Test 1 times a day & as needed for symptoms of irregular blood glucose. Dispense sufficient amount for indicated testing frequency plus additional to accommodate PRN testing needs. , Disp-100 strip, R-1, Normal      albuterol sulfate HFA (PROVENTIL HFA) 108 (90 Base) MCG/ACT inhaler Inhale 2 puffs into the lungs every 4 hours as needed for Wheezing or Shortness of Breath, Disp-18 g, R-3Normal      empagliflozin (JARDIANCE) 25 MG tablet Take 1 tablet by mouth daily, Disp-90 tablet, R-1Normal      lisinopril (PRINIVIL;ZESTRIL) 30 MG tablet Take 1 tablet by mouth daily, Disp-90 tablet, R-1Normal      metFORMIN (GLUCOPHAGE) 1000 MG tablet Take 1 tablet by mouth 2 times daily (with meals), Disp-180 tablet, R-1Normal      atorvastatin (LIPITOR) 20 MG tablet Take 1 tablet by mouth daily, Disp-90 tablet, R-1Normal      Omega-3 Fatty Acids (FISH OIL) 1000 MG CAPS Take 3,000 mg by mouth 3 times daily.         aspirin 81 MG EC tablet Take 81 mg by mouth daily. CINNAMON PO Take 1,000 mg by mouth 2 times daily. Multiple Vitamin (MULTI-VITAMIN PO) Take  by mouth. Discharge Medication List as of 11/3/2022  3:12 PM        STOP taking these medications       Tirzepatide (MOUNJARO) 5 MG/0.5ML SOPN SC injection Comments:   Reason for Stopping:         Tirzepatide (MOUNJARO) 7.5 MG/0.5ML SOPN SC injection Comments:   Reason for Stopping:         glipiZIDE (GLUCOTROL) 10 MG tablet Comments:   Reason for Stopping:                 Significant Test Results    No results found. Consults:     IP CONSULT TO HOSPITALIST  IP CONSULT TO DIABETES EDUCATOR    Labs: For convenience and continuity at follow-up the following most recent labs are provided:    Lab Results   Component Value Date/Time    WBC 9.5 11/02/2022 02:42 AM    HGB 13.4 11/02/2022 02:42 AM    HCT 40.2 11/02/2022 02:42 AM    MCV 90.0 11/02/2022 02:42 AM     11/02/2022 02:42 AM     11/03/2022 04:59 AM    K 3.9 11/03/2022 04:59 AM     11/03/2022 04:59 AM    CO2 18 11/03/2022 04:59 AM    BUN 10 11/03/2022 04:59 AM    CREATININE 0.6 11/03/2022 04:59 AM    CALCIUM 8.5 11/03/2022 04:59 AM    PHOS 1.7 11/02/2022 01:15 PM    TROPONINI <0.01 11/01/2022 01:51 AM    ALKPHOS 128 11/01/2022 01:51 AM    ALT 11 11/01/2022 01:51 AM    AST 8 11/01/2022 01:51 AM    BILITOT 0.4 11/01/2022 01:51 AM    BILIDIR 0.2 11/15/2021 12:00 AM    LABALBU 4.8 11/01/2022 01:51 AM    LDLCALC 55 11/15/2021 12:00 AM    TRIG 220 11/15/2021 12:00 AM    LABA1C 11.5 10/11/2022 11:19 AM     Lab Results   Component Value Date    INR 1.13 11/01/2022         The patient was seen and examined on day of discharge and this discharge summary is in conjunction with any daily progress note from day of discharge. Time spent on discharge: 35 minutes in the examination, evaluation, counseling and review of medications and discharge plan.       Signed:    Homer Esposito MD   12/1/2022

## 2022-11-04 ENCOUNTER — CARE COORDINATION (OUTPATIENT)
Dept: CASE MANAGEMENT | Age: 67
End: 2022-11-04

## 2022-11-04 ENCOUNTER — TELEPHONE (OUTPATIENT)
Dept: FAMILY MEDICINE CLINIC | Age: 67
End: 2022-11-04

## 2022-11-04 DIAGNOSIS — E11.10 DKA, TYPE 2, NOT AT GOAL (HCC): Primary | ICD-10-CM

## 2022-11-04 LAB
BLOOD CULTURE, ROUTINE: ABNORMAL
BLOOD CULTURE, ROUTINE: ABNORMAL
ORGANISM: ABNORMAL
ORGANISM: ABNORMAL

## 2022-11-04 NOTE — TELEPHONE ENCOUNTER
[facility] Transitions Initial Follow Up Call    Outreach made within 2 business days of discharge: Yes    Patient: Elidia Kaba Patient : 1955   MRN: [id number]  Reason for Admission: There are no discharge diagnoses documented for the most recent discharge. Discharge Date: 11/3/22       Spoke with: PT for post hospital follow up, scheduled TCM with PCP. Discharge department/facility: [facility]    Non-face-to-face services provided:  Scheduled appointment with PCP-11/10/2022  Obtained and reviewed discharge summary and/or continuity of care documents    TCM Interactive Patient Contact:  Was patient able to fill all prescriptions: Yes  Was patient instructed to bring all medications to the follow-up visit: Yes  Is patient taking all medications as directed in the discharge summary?  Yes  Does patient understand their discharge instructions: Yes  Does patient have questions or concerns that need addressed prior to 7-14 day follow up office visit: no    Scheduled appointment with PCP within 7-14 days    Follow Up  Future Appointments   Date Time Provider Elmer Calixto   11/10/2022 11:00 AM DO ADAM Styles   2023  8:30 AM DO ADAM Styles - HALIE Gasca LPN

## 2022-11-04 NOTE — CARE COORDINATION
[facility] Care Transitions Initial Follow Up Call    Call within 2 business days of discharge: Yes    Care Transition Nurse contacted the family by telephone to perform post hospital discharge assessment. Verified name and  with family as identifiers. Provided introduction to self, and explanation of the Care Transition Nurse role. Patient: Donnis Phoenix Ridings Patient : 1955   MRN: [id number]  Reason for Admission: DKA, history of diabetes, hypertension, hyperlipidemia  Discharge Date: 11/3/22 RARS: Readmission Risk Score: 10.2      Last Discharge  Street       Date Complaint Diagnosis Description Type Department Provider    22 Nausea; Dental Problem; Fatigue Diabetic ketoacidosis without coma associated with diabetes mellitus due to underlying condition ([facility] [address] . .. ED to Hosp-Admission (Discharged) (ADMITTED) Erica Esposito MD; Nicole Taveras ... Was this an external facility discharge? No Discharge Facility:     Challenges to be reviewed by the provider   Additional needs identified to be addressed with provider: No  none               Method of communication with provider: none. spoke to Jamel, patient's wife- HIPAA form verified in system. Patient's wife pleasant and agreeable to transition call. Verified patient's . Patient did not rest well at hospital and was lying back down. Wife assist with medications and full requisition completed and noted in system. Wife with questions of changing injection time of insulin due to patient returning to work next week. CTN educated wife on option of moving injection time up an hour at a time every 24 hours. Discussed time schedule with wife and took notes to assist appropriate injection time for patient. Full medication reconciliation completed and noted in system. Jamel is requesting assistance with follow up scheduling with endocrinologist. 3 way call completed with CTN assisted and spoke to Elmer Jacobs.  Follow up scheduled with  Keerthi Dyer at [phone] for 11/11/22 at 0920 at [facility] office [address]. Wife appreciative of assistance and denied any acute needs at present time. Agreeable to f/u calls. Educated on the use of urgent care or physicians 24 hr access line if assistance is needed after hours. Care Transition Nurse reviewed discharge instructions with patient who verbalized understanding. The patient and family was given an opportunity to ask questions and does not have any further questions or concerns at this time. Were discharge instructions available to patient? Yes. Reviewed appropriate site of care based on symptoms and resources available to patient including: PCP  Specialist. The patient agrees to contact the PCP office for questions related to their healthcare. Advance Care Planning:   Does patient have an Advance Directive: not on file. Medication reconciliation was performed with family, who verbalizes understanding of administration of home medications.  Medications reviewed, 1111F entered: yes    Was patient discharged with a pulse oximeter? no    Non-face-to-face services provided:  Obtained and reviewed discharge summary and/or continuity of care documents    Offered patient enrollment in the Remote Patient Monitoring (RPM) program for in-home monitoring: Patient is not eligible for RPM program.    Care Transitions 24 Hour Call    Do you have a copy of your discharge instructions?: Yes  Do you have all of your prescriptions and are they filled?: Yes  Have you been contacted by a [facility] Pharmacist?: No  Have you scheduled your follow up appointment?: Yes  How are you going to get to your appointment?: Car - family or friend to transport  Do you have support at home?: Partner/Spouse/SO  Do you feel like you have everything you need to keep you well at home?: Yes  Are you an active caregiver in your home?: No  Care Transitions Interventions         Follow Up  Future Appointments   Date Time Provider Elmer Calixto   11/10/2022 11:00 AM [facility] [place] [facility]   1/11/2023  8:30 AM Abdirashid De La Vega DO [facility] [place]       Care Transition Nurse provided contact information. Plan for follow-up call in 5-7 days based on severity of symptoms and risk factors.   Plan for next call: follow-up appointment-attempt to schedule follow up with endocrinologist    Natasha Butt RN

## 2022-11-05 LAB — CULTURE, BLOOD 2: NORMAL

## 2022-11-10 ENCOUNTER — OFFICE VISIT (OUTPATIENT)
Dept: FAMILY MEDICINE CLINIC | Age: 67
End: 2022-11-10

## 2022-11-10 VITALS
HEART RATE: 82 BPM | WEIGHT: 198.8 LBS | BODY MASS INDEX: 26.96 KG/M2 | SYSTOLIC BLOOD PRESSURE: 116 MMHG | OXYGEN SATURATION: 96 % | DIASTOLIC BLOOD PRESSURE: 60 MMHG

## 2022-11-10 DIAGNOSIS — E11.10 DKA, TYPE 2, NOT AT GOAL (HCC): Primary | ICD-10-CM

## 2022-11-10 DIAGNOSIS — N39.43 DRIBBLING OF URINE: ICD-10-CM

## 2022-11-10 DIAGNOSIS — Z09 HOSPITAL DISCHARGE FOLLOW-UP: ICD-10-CM

## 2022-11-10 DIAGNOSIS — E11.65 TYPE 2 DIABETES MELLITUS WITH HYPERGLYCEMIA, WITHOUT LONG-TERM CURRENT USE OF INSULIN (HCC): ICD-10-CM

## 2022-11-10 LAB
BILIRUBIN, POC: ABNORMAL
BLOOD URINE, POC: ABNORMAL
CLARITY, POC: CLEAR
COLOR, POC: YELLOW
GLUCOSE URINE, POC: >=1000
KETONES, POC: 40
LEUKOCYTE EST, POC: ABNORMAL
NITRITE, POC: ABNORMAL
PH, POC: 5.5
PROTEIN, POC: ABNORMAL
SPECIFIC GRAVITY, POC: 1.01
UROBILINOGEN, POC: 0.2

## 2022-11-10 NOTE — LETTER
[address]  [facility] [address]  Phone: [phone]  Fax: [phone]    Klaus Child DO        November 10, 2022     Patient: Boris Randall   YOB: 1955   Date of Visit: 11/10/2022       To Whom It May Concern: It is my medical opinion that Mohit Martinez may return to work on 11/7. He should be excused from 11/1-11/7. If you have any questions or concerns, please don't hesitate to call.     Sincerely,    Klaus Child DO

## 2022-11-10 NOTE — PROGRESS NOTES
Post-Discharge Transitional Care  Follow Up      Brennen Caballero   YOB: 1955    Date of Office Visit:  11/10/2022  Date of Hospital Admission: 11/1/22  Date of Hospital Discharge: 11/3/22  Risk of hospital readmission (high >=14%. Medium >=10%) :Readmission Risk Score: 10.2      Care management risk score Rising risk (score 2-5) and Complex Care (Scores >=6): No Risk Score On File     Non face to face  following discharge, date last encounter closed (first attempt may have been earlier): 11/04/2022    Call initiated 2 business days of discharge: Yes    ASSESSMENT/PLAN:   DKA, type 2, not at goal [facility])  Type 2 diabetes mellitus with hyperglycemia, without long-term current use of insulin [facility])  Hospital discharge follow-up  -     NM DISCHARGE MEDS RECONCILED W/ CURRENT OUTPATIENT MED LIST  Dribbling of urine  -     POCT Urinalysis no Micro  UA without signs of acute infection. Large amount of glucose. Discussed importance of diet modification which patient is doing. Will monitor for improvement and if not improving/resolving over the next week or 2 has follow-up with endocrinology next week. No titration of medications at this time given plan for follow-up. Medical Decision Making: moderate complexity  No follow-ups on file. Subjective:   HPI:  Follow up of Hospital problems/diagnosis(es): As above    Inpatient course: Discharge summary reviewed- see chart. Interval history/Current status:     Patient admitted with DKA after not starting mounjaro, not taking metformin or glipizide either for several days following recent dental work. Resolved with fluids and insulin in the hospital.  Has been referred to endocrinologist and plans to follow-up in 6 days. Has been monitoring glucose at home with fingersticks and highest glucose 268. Average 141. No hypoglycemic episodes    Also reports episodes of urinary dribbling/incontinence.   Reports that he had catheter during hospitalization and since removal has noticed this difficulty. No urinary retention or pelvic pain. No blood. Patient Active Problem List   Diagnosis    Type 2 diabetes mellitus with hyperglycemia, without long-term current use of insulin ([facility] [address]    Hypertension associated with diabetes ([facility] [address]    Hyperlipidemia associated with type 2 diabetes mellitus ([facility] [address]    Prepatellar abscess    Sensorineural hearing loss, bilateral    DKA, type 2, not at goal [facility])    History of tooth extraction, unspecified edentulism    High anion gap metabolic acidosis       Medications listed as ordered at the time of discharge from hospital     Medication List            Accurate as of November 10, 2022  1:09 PM. If you have any questions, ask your nurse or doctor. CONTINUE taking these medications      albuterol sulfate  (90 Base) MCG/ACT inhaler  Commonly known as: Proventil HFA  Inhale 2 puffs into the lungs every 4 hours as needed for Wheezing or Shortness of Breath     aspirin 81 MG EC tablet     atorvastatin 20 MG tablet  Commonly known as: LIPITOR  Take 1 tablet by mouth daily     blood glucose test strips  Test 1 times a day & as needed for symptoms of irregular blood glucose. Dispense sufficient amount for indicated testing frequency plus additional to accommodate PRN testing needs.      CINNAMON PO     empagliflozin 25 MG tablet  Commonly known as: Jardiance  Take 1 tablet by mouth daily     fish oil 1000 MG capsule     glucose monitoring kit  1 kit by Does not apply route daily     lactobacillus capsule  Take 1 capsule by mouth 2 times daily (with meals) for 7 days     Lancets Misc  1 each by Does not apply route daily     Lantus SoloStar 100 UNIT/ML injection pen  Generic drug: insulin glargine  Inject 15 Units into the skin nightly     lisinopril 30 MG tablet  Commonly known as: PRINIVIL;ZESTRIL  Take 1 tablet by mouth daily     metFORMIN 1000 MG tablet  Commonly known as: GLUCOPHAGE  Take 1 tablet by mouth 2 times daily (with meals)     MULTI-VITAMIN PO                Medications marked \"taking\" at this time  Outpatient Medications Marked as Taking for the 11/10/22 encounter (Office Visit) with Huey Albarran, DO   Medication Sig Dispense Refill    insulin glargine (LANTUS SOLOSTAR) 100 UNIT/ML injection pen Inject 15 Units into the skin nightly 5 Adjustable Dose Pre-filled Pen Syringe 0    lactobacillus (CULTURELLE) capsule Take 1 capsule by mouth 2 times daily (with meals) for 7 days 14 capsule 0    glucose monitoring (FREESTYLE FREEDOM) kit 1 kit by Does not apply route daily 1 kit 0    Lancets MISC 1 each by Does not apply route daily 100 each 5    blood glucose monitor strips Test 1 times a day & as needed for symptoms of irregular blood glucose. Dispense sufficient amount for indicated testing frequency plus additional to accommodate PRN testing needs. 100 strip 1    albuterol sulfate HFA (PROVENTIL HFA) 108 (90 Base) MCG/ACT inhaler Inhale 2 puffs into the lungs every 4 hours as needed for Wheezing or Shortness of Breath 18 g 3    empagliflozin (JARDIANCE) 25 MG tablet Take 1 tablet by mouth daily 90 tablet 1    lisinopril (PRINIVIL;ZESTRIL) 30 MG tablet Take 1 tablet by mouth daily 90 tablet 1    metFORMIN (GLUCOPHAGE) 1000 MG tablet Take 1 tablet by mouth 2 times daily (with meals) 180 tablet 1    atorvastatin (LIPITOR) 20 MG tablet Take 1 tablet by mouth daily 90 tablet 1    Omega-3 Fatty Acids (FISH OIL) 1000 MG CAPS Take 3,000 mg by mouth 3 times daily. aspirin 81 MG EC tablet Take 81 mg by mouth daily. CINNAMON PO Take 1,000 mg by mouth 2 times daily. Multiple Vitamin (MULTI-VITAMIN PO) Take  by mouth.             Medications patient taking as of now reconciled against medications ordered at time of hospital discharge: Yes        Objective:    /60 (Site: Right Upper Arm, Position: Sitting, Cuff Size: Medium Adult)   Pulse 82   Wt 198 lb 12.8 oz (90.2 kg)   SpO2 96% BMI 26.96 kg/m²         An electronic signature was used to authenticate this note.   --Landon Samuels, DO

## 2022-11-11 ENCOUNTER — CARE COORDINATION (OUTPATIENT)
Dept: CASE MANAGEMENT | Age: 67
End: 2022-11-11

## 2022-11-11 NOTE — CARE COORDINATION
[facility] Care Transitions Follow Up Call    Care Transition Nurse contacted the family by telephone to follow up after admission. Verified name and  with family as identifiers. Patient: Germaine Caballero  Patient : 1955   MRN: [id number]  Reason for Admission: DKA, history of diabetes, hypertension, hyperlipidemia  Discharge Date: 11/3/22 RARS: Readmission Risk Score: 10.2      Needs to be reviewed by the provider   Additional needs identified to be addressed with provider: No  none             Method of communication with provider: none. spoke to Magaly Fountain, patient's wife- HIPAA form verified in system. Patient was sleeping due to second shift work schedule. Wife stated that patient is doing well and checking glucose on routine. Patient was seen by PCP yesterday and visit went good. No changes with medication. Reviewed glucose results and yesterday morning 118. Appt with endocrinology was changed from today to Wed of next week by MD office. Wife was not given a reason, but did reschedule appt per office request. Confirmed that patient had enough medication until the visit. Denied any acute needs at present time. Agreeable to f/u calls. Educated on the use of urgent care or physicians 24 hr access line if assistance is needed after hours. Addressed changes since last contact:  none  Discussed follow-up appointments. If no appointment was previously scheduled, appointment scheduling offered: Yes. Is follow up appointment scheduled within 7 days of discharge? Yes. Follow Up  Future Appointments   Date Time Provider Elmer Calixto   5/10/2023 10:30 AM DO ADAM Gage [place]     [facility] follow up appointment(s):     Care Transition Nurse reviewed medical action plan with family and discussed any barriers to care and/or understanding of plan of care after discharge.  Discussed appropriate site of care based on symptoms and resources available to patient including: PCP  Specialist. The family agrees to contact the PCP office for questions related to their healthcare. Advance Care Planning:   not on file. Patients top risk factors for readmission: medical condition-DM and multiple health system providers  Interventions to address risk factors: Obtained and reviewed discharge summary and/or continuity of care documents    Offered patient enrollment in the Remote Patient Monitoring (RPM) program for in-home monitoring: Patient is not eligible for RPM program.     Care Transitions Subsequent and Final Call    Subsequent and Final Calls  Do you have any ongoing symptoms?: No  Have your medications changed?: No  Do you have any questions related to your medications?: No  Do you currently have any active services?: No  Do you have any needs or concerns that I can assist you with?: No  Identified Barriers: None  Care Transitions Interventions  Other Interventions:             Care Transition Nurse provided contact information for future needs. Plan for follow-up call in 5-7 days based on severity of symptoms and risk factors.   Plan for next call: follow-up appointment-endocrinology    Phan Marroquin RN

## 2022-11-17 ENCOUNTER — CARE COORDINATION (OUTPATIENT)
Dept: CASE MANAGEMENT | Age: 67
End: 2022-11-17

## 2022-11-17 NOTE — CARE COORDINATION
[facility] Care Transitions Follow Up Call    Care Transition Nurse contacted the patient by telephone to follow up after admission. Verified name and  with family as identifiers. Patient: Saqib Caballero  Patient : 1955   MRN: [id number]  Reason for Admission: DKA,  history of diabetes, hypertension, hyperlipidemia  Discharge Date: 11/3/22 RARS: Readmission Risk Score: 10.2      Needs to be reviewed by the provider   Additional needs identified to be addressed with provider: No  none             Method of communication with provider: none. spoke to Jamel, patient's wife- HIPAA form verified in system. Patient was seen by Dr Jennyfer Malhotra, endocrinologist yesterday and lantus insulin was increased to 18 units. New prescriptions were given with discount coupons. Patient taking all medication as directed per wife. Checking glucose 4xday before meals and bedtime. Stated that hgA1c at MD visit yesterday was 10% . Reviewed lab results in system and noted hgA1c in 10/22 it was  11.5%. discussed importance of getting hgA1c in normal range and making small changes can make big results. Denies any acute needs at present time. Agreeable to f/u calls. Educated on the use of urgent care or physicians 24 hr access line if assistance is needed after hours. Addressed changes since last contact:  none  Discussed follow-up appointments. If no appointment was previously scheduled, appointment scheduling offered: Yes. Is follow up appointment scheduled within 7 days of discharge? Yes. Follow Up  Future Appointments   Date Time Provider Elmer Calixto   5/10/2023 10:30 AM DO ADAM Chan [place]     [facility] follow up appointment(s):     Care Transition Nurse reviewed medical action plan with patient and discussed any barriers to care and/or understanding of plan of care after discharge.  Discussed appropriate site of care based on symptoms and resources available to patient including: PCP  Specialist. The patient agrees to contact the PCP office for questions related to their healthcare. Advance Care Planning:   not on file. Patients top risk factors for readmission: medication management  Interventions to address risk factors: Obtained and reviewed discharge summary and/or continuity of care documents    Offered patient enrollment in the Remote Patient Monitoring (RPM) program for in-home monitoring: Patient is not eligible for RPM program.     Care Transitions Subsequent and Final Call    Subsequent and Final Calls  Do you have any ongoing symptoms?: No  Have your medications changed?: Yes  Patient Reports: see note  Do you have any questions related to your medications?: No  Do you currently have any active services?: No  Do you have any needs or concerns that I can assist you with?: No  Identified Barriers: None  Care Transitions Interventions  Other Interventions:             Care Transition Nurse provided contact information for future needs. Plan for follow-up call in 10-14 days based on severity of symptoms and risk factors.   Plan for next call: follow-up appointment-seen by DR Eugenio Alaniz, endocrinologist    Shirlene Galicia RN

## 2022-12-01 ENCOUNTER — CARE COORDINATION (OUTPATIENT)
Dept: CASE MANAGEMENT | Age: 67
End: 2022-12-01

## 2022-12-01 NOTE — CARE COORDINATION
[facility] Care Transitions Follow Up Call    Patient: Cathern Boeck Ridings  Patient : 1955   MRN: [id number]  Reason for Admission: DKA,  history of diabetes, hypertension, hyperlipidemia  Discharge Date: 11/3/22 RARS: Readmission Risk Score: 10.2    Attempted to reach patient via phone for transition call. VM left stating purpose of call along with my contact information requesting a return call.     Follow Up  Future Appointments   Date Time Provider Elmer Calixto   5/10/2023 10:30 AM DO ADAM [place] - [place]      Care Transitions Subsequent and Final Call    Subsequent and Final Calls  Care Transitions Interventions  Other Interventions:             Natasha Butt RN

## 2022-12-02 ENCOUNTER — CARE COORDINATION (OUTPATIENT)
Dept: CASE MANAGEMENT | Age: 67
End: 2022-12-02

## 2022-12-02 NOTE — CARE COORDINATION
[facility] Care Transitions Follow Up Call    Care Transition Nurse contacted the patient by telephone to follow up after admission. Verified name and  with patient as identifiers. Patient: Ga Caballero  Patient : 1955   MRN: [id number]  Reason for Admission: DKA, history of diabetes, hypertension, hyperlipidemia  Discharge Date: 11/3/22 RARS: Readmission Risk Score: 10.2      Needs to be reviewed by the provider   Additional needs identified to be addressed with provider: No  none             Method of communication with provider: none. Patient answered call and verified . Patient pleasant and agreeable to transition call. Addressed changes since last contact:  none  Discussed follow-up appointments. If no appointment was previously scheduled, appointment scheduling offered: Yes. Is follow up appointment scheduled within 7 days of discharge? Yes. Follow Up  Future Appointments   Date Time Provider Elmer Calixto   5/10/2023 10:30 AM [facility]     [facility] follow up appointment(s): Patient answered call and verified . Patient pleasant and agreeable to transition call. Patient works second shift and requested morning call. Patient is doing \"much better\" but stated that glucose is about 140 in the mornings. Patient stated that is more aware of diet choices and following close to diabetic diet. Stated that he had a set back with Thanksgiving, but back to his diet. Patient scheduled for endocrinology, Dr Gideon South follow up in 2023. Confirmed that he is taking all medication as directed. Denied any acute needs at present time. patient stable and transition episode closed. Educated on the use of urgent care or physicians 24 hr access line if assistance is needed after hours. Care Transition Nurse reviewed medical action plan with patient and discussed any barriers to care and/or understanding of plan of care after discharge.  Discussed appropriate site of care based on symptoms and resources available to patient including: PCP  Specialist. The patient agrees to contact the PCP office for questions related to their healthcare. Advance Care Planning:   not on file. Patients top risk factors for readmission: functional physical ability  Interventions to address risk factors: Education of patient/family/caregiver/guardian to support self-management-        Care Transitions Subsequent and Final Call    Subsequent and Final Calls  Do you have any ongoing symptoms?: No  Have your medications changed?: No  Do you have any questions related to your medications?: No  Do you currently have any active services?: No  Do you have any needs or concerns that I can assist you with?: No  Identified Barriers: None  Care Transitions Interventions  Other Interventions:             Care Transition Nurse provided contact information for future needs. No further follow-up call indicated based on severity of symptoms and risk factors.     Rachell Juarez RN
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
## 2023-02-15 LAB
AVERAGE GLUCOSE: NORMAL
CREATININE, URINE: 17.6
HBA1C MFR BLD: 7.6 %
MICROALBUMIN/CREAT 24H UR: 44.8 MG/G{CREAT}
MICROALBUMIN/CREAT UR-RTO: 225

## 2023-05-10 ENCOUNTER — OFFICE VISIT (OUTPATIENT)
Dept: FAMILY MEDICINE CLINIC | Age: 68
End: 2023-05-10
Payer: COMMERCIAL

## 2023-05-10 VITALS
HEIGHT: 72 IN | DIASTOLIC BLOOD PRESSURE: 60 MMHG | OXYGEN SATURATION: 97 % | WEIGHT: 213.8 LBS | BODY MASS INDEX: 28.96 KG/M2 | HEART RATE: 89 BPM | SYSTOLIC BLOOD PRESSURE: 132 MMHG

## 2023-05-10 DIAGNOSIS — E11.65 TYPE 2 DIABETES MELLITUS WITH HYPERGLYCEMIA, WITHOUT LONG-TERM CURRENT USE OF INSULIN (HCC): Primary | ICD-10-CM

## 2023-05-10 DIAGNOSIS — E11.29 MICROALBUMINURIA DUE TO TYPE 2 DIABETES MELLITUS (HCC): ICD-10-CM

## 2023-05-10 DIAGNOSIS — E11.59 HYPERTENSION ASSOCIATED WITH DIABETES (HCC): ICD-10-CM

## 2023-05-10 DIAGNOSIS — R80.9 MICROALBUMINURIA DUE TO TYPE 2 DIABETES MELLITUS (HCC): ICD-10-CM

## 2023-05-10 DIAGNOSIS — Z12.11 SCREEN FOR COLON CANCER: ICD-10-CM

## 2023-05-10 DIAGNOSIS — Z13.6 SCREENING FOR AAA (ABDOMINAL AORTIC ANEURYSM): ICD-10-CM

## 2023-05-10 DIAGNOSIS — E11.69 HYPERLIPIDEMIA ASSOCIATED WITH TYPE 2 DIABETES MELLITUS (HCC): ICD-10-CM

## 2023-05-10 DIAGNOSIS — E78.5 HYPERLIPIDEMIA ASSOCIATED WITH TYPE 2 DIABETES MELLITUS (HCC): ICD-10-CM

## 2023-05-10 DIAGNOSIS — I15.2 HYPERTENSION ASSOCIATED WITH DIABETES (HCC): ICD-10-CM

## 2023-05-10 PROCEDURE — 1123F ACP DISCUSS/DSCN MKR DOCD: CPT | Performed by: STUDENT IN AN ORGANIZED HEALTH CARE EDUCATION/TRAINING PROGRAM

## 2023-05-10 PROCEDURE — 99214 OFFICE O/P EST MOD 30 MIN: CPT | Performed by: STUDENT IN AN ORGANIZED HEALTH CARE EDUCATION/TRAINING PROGRAM

## 2023-05-10 PROCEDURE — 3051F HG A1C>EQUAL 7.0%<8.0%: CPT | Performed by: STUDENT IN AN ORGANIZED HEALTH CARE EDUCATION/TRAINING PROGRAM

## 2023-05-10 PROCEDURE — 3078F DIAST BP <80 MM HG: CPT | Performed by: STUDENT IN AN ORGANIZED HEALTH CARE EDUCATION/TRAINING PROGRAM

## 2023-05-10 PROCEDURE — 3074F SYST BP LT 130 MM HG: CPT | Performed by: STUDENT IN AN ORGANIZED HEALTH CARE EDUCATION/TRAINING PROGRAM

## 2023-05-10 RX ORDER — ATORVASTATIN CALCIUM 20 MG/1
20 TABLET, FILM COATED ORAL DAILY
Qty: 90 TABLET | Refills: 1 | Status: SHIPPED | OUTPATIENT
Start: 2023-05-10

## 2023-05-10 RX ORDER — LISINOPRIL 30 MG/1
30 TABLET ORAL DAILY
Qty: 90 TABLET | Refills: 1 | Status: SHIPPED | OUTPATIENT
Start: 2023-05-10

## 2023-05-10 ASSESSMENT — PATIENT HEALTH QUESTIONNAIRE - PHQ9
SUM OF ALL RESPONSES TO PHQ QUESTIONS 1-9: 0
SUM OF ALL RESPONSES TO PHQ9 QUESTIONS 1 & 2: 0
1. LITTLE INTEREST OR PLEASURE IN DOING THINGS: 0
SUM OF ALL RESPONSES TO PHQ QUESTIONS 1-9: 0
2. FEELING DOWN, DEPRESSED OR HOPELESS: 0

## 2023-05-10 NOTE — PROGRESS NOTES
Patient: Gertrude Talley is a 74 y.o. male who presents today with the following Chief Complaint(s):  Chief Complaint   Patient presents with    Diabetes         HPI    Dm2  Following with endocrinology, Dr. Katie Benson nightly  Metformin 1000mg BID  Jardiance 25mg daily  Has improved diet    HTN  Lisinopril 30mg daily    HLD  Lipitor 20mg      Current Outpatient Medications   Medication Sig Dispense Refill    lisinopril (PRINIVIL;ZESTRIL) 30 MG tablet Take 1 tablet by mouth daily 90 tablet 1    atorvastatin (LIPITOR) 20 MG tablet Take 1 tablet by mouth daily 90 tablet 1    insulin glargine (LANTUS SOLOSTAR) 100 UNIT/ML injection pen Inject 15 Units into the skin nightly 5 Adjustable Dose Pre-filled Pen Syringe 0    glucose monitoring (FREESTYLE FREEDOM) kit 1 kit by Does not apply route daily 1 kit 0    Lancets MISC 1 each by Does not apply route daily 100 each 5    blood glucose monitor strips Test 1 times a day & as needed for symptoms of irregular blood glucose. Dispense sufficient amount for indicated testing frequency plus additional to accommodate PRN testing needs. 100 strip 1    albuterol sulfate HFA (PROVENTIL HFA) 108 (90 Base) MCG/ACT inhaler Inhale 2 puffs into the lungs every 4 hours as needed for Wheezing or Shortness of Breath 18 g 3    empagliflozin (JARDIANCE) 25 MG tablet Take 1 tablet by mouth daily 90 tablet 1    metFORMIN (GLUCOPHAGE) 1000 MG tablet Take 1 tablet by mouth 2 times daily (with meals) 180 tablet 1    Omega-3 Fatty Acids (FISH OIL) 1000 MG CAPS Take 3 capsules by mouth 3 times daily      aspirin 81 MG EC tablet Take 1 tablet by mouth daily      CINNAMON PO Take 1,000 mg by mouth 2 times daily. Multiple Vitamin (MULTI-VITAMIN PO) Take  by mouth. No current facility-administered medications for this visit.        Patient's past medical history, surgical history, family history, medications,  andallergies  were all reviewed and updated as appropriate

## 2024-02-13 ENCOUNTER — OFFICE VISIT (OUTPATIENT)
Dept: FAMILY MEDICINE CLINIC | Age: 69
End: 2024-02-13
Payer: COMMERCIAL

## 2024-02-13 VITALS
TEMPERATURE: 97.6 F | OXYGEN SATURATION: 96 % | HEIGHT: 72 IN | WEIGHT: 205 LBS | HEART RATE: 114 BPM | DIASTOLIC BLOOD PRESSURE: 68 MMHG | SYSTOLIC BLOOD PRESSURE: 136 MMHG | BODY MASS INDEX: 27.77 KG/M2

## 2024-02-13 DIAGNOSIS — J01.90 ACUTE NON-RECURRENT SINUSITIS, UNSPECIFIED LOCATION: Primary | ICD-10-CM

## 2024-02-13 PROCEDURE — 99213 OFFICE O/P EST LOW 20 MIN: CPT | Performed by: NURSE PRACTITIONER

## 2024-02-13 PROCEDURE — 3075F SYST BP GE 130 - 139MM HG: CPT | Performed by: NURSE PRACTITIONER

## 2024-02-13 PROCEDURE — 1123F ACP DISCUSS/DSCN MKR DOCD: CPT | Performed by: NURSE PRACTITIONER

## 2024-02-13 PROCEDURE — 3078F DIAST BP <80 MM HG: CPT | Performed by: NURSE PRACTITIONER

## 2024-02-13 RX ORDER — CEFDINIR 300 MG/1
300 CAPSULE ORAL 2 TIMES DAILY
Qty: 14 CAPSULE | Refills: 0 | Status: SHIPPED | OUTPATIENT
Start: 2024-02-13 | End: 2024-02-20

## 2024-02-13 RX ORDER — FLURBIPROFEN SODIUM 0.3 MG/ML
SOLUTION/ DROPS OPHTHALMIC
COMMUNITY
Start: 2023-11-13

## 2024-02-13 NOTE — PROGRESS NOTES
Bipin Caballero (:  1955) is a 68 y.o. male,Established patient, here for evaluation of the following chief complaint(s):  Nasal Congestion (Sxs x2 weeks. ), Chest Congestion, Sinus Problem, and Cough (Sxs 2/10/2024)       ASSESSMENT/PLAN:  1. Acute non-recurrent sinusitis, unspecified location  -     cefdinir (OMNICEF) 300 MG capsule; Take 1 capsule by mouth 2 times daily for 7 days, Disp-14 capsule, R-0Normal    -Discussed testing for Covid or flu, patient declines  -Reviewed allergies, discussed medication risks/benefits, side effects. Take with food   -Discussed symptoms management   -Discussed Tessalon or phenergan for cough,patient declines at this time   -May take Mucinex, Flonase, normal saline spray. Do not take Afrin for more than three days   -Discussed alarm symptoms   -Work note provided   Drink plenty of fluids   Get plenty of rest  Honey with tea  Humidifier, steamy showers  Finish course of antibiotics, taking with food  Flonase or normal saline nasal spray   Take medications as prescribed   Go to nearest ER if develop shortness of breath, chest pain or significant worsening of symptoms   Notify office if symptoms worsen or do not improve    Mucinex per package directions     Return if symptoms worsen or fail to improve.       Subjective   SUBJECTIVE/OBJECTIVE:  Reports symptoms for two weeks   Reports sinus pain/pressure, rhinorrhea, congestion, cough,   Has been using Afrin at home   Non smoker   No PMH for COPD or Asthma   No home Covid tests       Chest Congestion  Associated symptoms include congestion and coughing. Pertinent negatives include no chest pain, chills, fatigue, fever, headaches, myalgias, nausea, sore throat or vomiting.   Sinus Problem  Associated symptoms include congestion, coughing and sinus pressure. Pertinent negatives include no chills, ear pain, headaches, shortness of breath, sneezing or sore throat.   Cough  Associated symptoms include postnasal drip and

## 2024-02-13 NOTE — PATIENT INSTRUCTIONS
Drink plenty of fluids   Get plenty of rest  Honey with tea  Humidifier, steamy showers  Finish course of antibiotics, taking with food  Flonase or normal saline nasal spray   Take medications as prescribed   Go to nearest ER if develop shortness of breath, chest pain or significant worsening of symptoms   Notify office if symptoms worsen or do not improve    Mucinex per package directions

## 2024-02-19 DIAGNOSIS — I15.2 HYPERTENSION ASSOCIATED WITH DIABETES (HCC): ICD-10-CM

## 2024-02-19 DIAGNOSIS — E11.65 TYPE 2 DIABETES MELLITUS WITH HYPERGLYCEMIA, WITHOUT LONG-TERM CURRENT USE OF INSULIN (HCC): ICD-10-CM

## 2024-02-19 DIAGNOSIS — E11.59 HYPERTENSION ASSOCIATED WITH DIABETES (HCC): ICD-10-CM

## 2024-02-19 RX ORDER — LISINOPRIL 30 MG/1
30 TABLET ORAL DAILY
Qty: 90 TABLET | Refills: 1 | Status: SHIPPED | OUTPATIENT
Start: 2024-02-19 | End: 2024-02-20 | Stop reason: SDUPTHER

## 2024-02-19 NOTE — TELEPHONE ENCOUNTER
Refill Request     CONFIRM preferred pharmacy with the patient.    If Mail Order Rx - Pend for 90 day refill.      Last Seen: Last Seen Department: 2/13/2024  Last Seen by PCP: 5/10/2023    Last Written: 5/10/23 90 tabs 1 refill     If no future appointment scheduled:  Review the last OV with PCP and review information for follow-up visit,  Route STAFF MESSAGE with patient name to the  Pool for scheduling with the following information:            -  Timing of next visit           -  Visit type ie Physical, OV, etc           -  Diagnoses/Reason ie. COPD, HTN - Do not use MEDICATION, Follow-up or CHECK UP - Give reason for visit      Next Appointment:   No future appointments.    Message sent to  to schedule appt with patient?  Yes      Requested Prescriptions     Pending Prescriptions Disp Refills    lisinopril (PRINIVIL;ZESTRIL) 30 MG tablet 90 tablet 1     Sig: Take 1 tablet by mouth daily

## 2024-02-20 DIAGNOSIS — I15.2 HYPERTENSION ASSOCIATED WITH DIABETES (HCC): ICD-10-CM

## 2024-02-20 DIAGNOSIS — E11.59 HYPERTENSION ASSOCIATED WITH DIABETES (HCC): ICD-10-CM

## 2024-02-20 DIAGNOSIS — E11.65 TYPE 2 DIABETES MELLITUS WITH HYPERGLYCEMIA, WITHOUT LONG-TERM CURRENT USE OF INSULIN (HCC): ICD-10-CM

## 2024-02-21 RX ORDER — LISINOPRIL 30 MG/1
30 TABLET ORAL DAILY
Qty: 90 TABLET | Refills: 1 | Status: SHIPPED | OUTPATIENT
Start: 2024-02-21

## 2024-03-25 ENCOUNTER — OFFICE VISIT (OUTPATIENT)
Dept: FAMILY MEDICINE CLINIC | Age: 69
End: 2024-03-25
Payer: COMMERCIAL

## 2024-03-25 VITALS
HEART RATE: 93 BPM | SYSTOLIC BLOOD PRESSURE: 120 MMHG | WEIGHT: 209.8 LBS | DIASTOLIC BLOOD PRESSURE: 60 MMHG | OXYGEN SATURATION: 94 % | BODY MASS INDEX: 28.45 KG/M2

## 2024-03-25 DIAGNOSIS — E11.65 TYPE 2 DIABETES MELLITUS WITH HYPERGLYCEMIA, WITHOUT LONG-TERM CURRENT USE OF INSULIN (HCC): Primary | ICD-10-CM

## 2024-03-25 DIAGNOSIS — E78.5 HYPERLIPIDEMIA ASSOCIATED WITH TYPE 2 DIABETES MELLITUS (HCC): ICD-10-CM

## 2024-03-25 DIAGNOSIS — E11.59 HYPERTENSION ASSOCIATED WITH DIABETES (HCC): ICD-10-CM

## 2024-03-25 DIAGNOSIS — I15.2 HYPERTENSION ASSOCIATED WITH DIABETES (HCC): ICD-10-CM

## 2024-03-25 DIAGNOSIS — R80.9 MICROALBUMINURIA DUE TO TYPE 2 DIABETES MELLITUS (HCC): ICD-10-CM

## 2024-03-25 DIAGNOSIS — E11.29 MICROALBUMINURIA DUE TO TYPE 2 DIABETES MELLITUS (HCC): ICD-10-CM

## 2024-03-25 DIAGNOSIS — E11.69 HYPERLIPIDEMIA ASSOCIATED WITH TYPE 2 DIABETES MELLITUS (HCC): ICD-10-CM

## 2024-03-25 LAB — HBA1C MFR BLD: 11.3 %

## 2024-03-25 PROCEDURE — 99214 OFFICE O/P EST MOD 30 MIN: CPT | Performed by: STUDENT IN AN ORGANIZED HEALTH CARE EDUCATION/TRAINING PROGRAM

## 2024-03-25 PROCEDURE — 3078F DIAST BP <80 MM HG: CPT | Performed by: STUDENT IN AN ORGANIZED HEALTH CARE EDUCATION/TRAINING PROGRAM

## 2024-03-25 PROCEDURE — 3046F HEMOGLOBIN A1C LEVEL >9.0%: CPT | Performed by: STUDENT IN AN ORGANIZED HEALTH CARE EDUCATION/TRAINING PROGRAM

## 2024-03-25 PROCEDURE — 83036 HEMOGLOBIN GLYCOSYLATED A1C: CPT | Performed by: STUDENT IN AN ORGANIZED HEALTH CARE EDUCATION/TRAINING PROGRAM

## 2024-03-25 PROCEDURE — 1123F ACP DISCUSS/DSCN MKR DOCD: CPT | Performed by: STUDENT IN AN ORGANIZED HEALTH CARE EDUCATION/TRAINING PROGRAM

## 2024-03-25 PROCEDURE — 3074F SYST BP LT 130 MM HG: CPT | Performed by: STUDENT IN AN ORGANIZED HEALTH CARE EDUCATION/TRAINING PROGRAM

## 2024-03-25 ASSESSMENT — PATIENT HEALTH QUESTIONNAIRE - PHQ9
SUM OF ALL RESPONSES TO PHQ9 QUESTIONS 1 & 2: 2
1. LITTLE INTEREST OR PLEASURE IN DOING THINGS: SEVERAL DAYS
2. FEELING DOWN, DEPRESSED OR HOPELESS: SEVERAL DAYS
SUM OF ALL RESPONSES TO PHQ QUESTIONS 1-9: 2

## 2024-03-25 NOTE — PROGRESS NOTES
Assessment:  Encounter Diagnoses   Name Primary?    Type 2 diabetes mellitus with hyperglycemia, without long-term current use of insulin (HCC) Yes    Hyperlipidemia associated with type 2 diabetes mellitus (HCC)     Hypertension associated with diabetes (HCC)     Microalbuminuria due to type 2 diabetes mellitus (HCC)        Plan:  1. Type 2 diabetes mellitus with hyperglycemia, without long-term current use of insulin (HCC)  -     POCT glycosylated hemoglobin (Hb A1C)  -     Comprehensive Metabolic Panel; Future  -     Hemoglobin A1C; Future  2. Hyperlipidemia associated with type 2 diabetes mellitus (HCC)  -     POCT glycosylated hemoglobin (Hb A1C)  -     Lipid Panel; Future  3. Hypertension associated with diabetes (HCC)  4. Microalbuminuria due to type 2 diabetes mellitus (HCC)  Encouraged follow-up with endocrinology given A1c elevation to 11.3 today.  Recheck lab work as above.  Blood pressure at goal and will continue current medication.  Continue Lipitor 20 mg daily.  Transitioning to Medicare and will schedule follow-up welcome to Medicare visit.  Discuss vaccinations and screenings at that time.      No follow-ups on file.      Patient: Bipin Caballero is a 68 y.o. male who presents today with the following Chief Complaint(s):  Chief Complaint   Patient presents with    Diabetes         HPI    DM2  Following with endocrinology, Dr. Abhilash Rosen 24U  Metformin 1000mg BID  Jardiance 25mg daily    HTN  Lisinopril 30mg daily     HLD  Lipitor 20mg      Current Outpatient Medications   Medication Sig Dispense Refill    lisinopril (PRINIVIL;ZESTRIL) 30 MG tablet Take 1 tablet by mouth daily 90 tablet 1    ULTICARE MINI PEN NEEDLES 31G X 6 MM MISC USE AS DIRECTED      atorvastatin (LIPITOR) 20 MG tablet Take 1 tablet by mouth daily 90 tablet 1    insulin glargine (LANTUS SOLOSTAR) 100 UNIT/ML injection pen Inject 15 Units into the skin nightly 5 Adjustable Dose Pre-filled Pen Syringe 0    glucose monitoring

## 2024-05-23 DIAGNOSIS — E11.65 TYPE 2 DIABETES MELLITUS WITH HYPERGLYCEMIA, WITHOUT LONG-TERM CURRENT USE OF INSULIN (HCC): ICD-10-CM

## 2024-05-23 DIAGNOSIS — I15.2 HYPERTENSION ASSOCIATED WITH DIABETES (HCC): ICD-10-CM

## 2024-05-23 DIAGNOSIS — E11.59 HYPERTENSION ASSOCIATED WITH DIABETES (HCC): ICD-10-CM

## 2024-05-23 RX ORDER — INSULIN GLARGINE 100 [IU]/ML
15 INJECTION, SOLUTION SUBCUTANEOUS NIGHTLY
Qty: 15 ADJUSTABLE DOSE PRE-FILLED PEN SYRINGE | Refills: 3 | Status: SHIPPED | OUTPATIENT
Start: 2024-05-23

## 2024-05-23 RX ORDER — LISINOPRIL 30 MG/1
30 TABLET ORAL DAILY
Qty: 90 TABLET | Refills: 1 | Status: SHIPPED | OUTPATIENT
Start: 2024-05-23

## 2024-05-23 RX ORDER — ATORVASTATIN CALCIUM 20 MG/1
20 TABLET, FILM COATED ORAL DAILY
Qty: 90 TABLET | Refills: 1 | Status: SHIPPED | OUTPATIENT
Start: 2024-05-23

## 2024-05-23 NOTE — TELEPHONE ENCOUNTER
Patient has now retired and is having to use a new pharmacy for all of his medications.     Also, he would like to know if you would take over caring for his diabetes. Patient would like to see if any generic medication can be prescribed as he has Medicare now. Please advise, thanks.

## 2024-05-24 NOTE — TELEPHONE ENCOUNTER
There is not a generic version of jardiance. Can discuss alternate medication options at his visit

## 2024-06-21 ENCOUNTER — OFFICE VISIT (OUTPATIENT)
Dept: FAMILY MEDICINE CLINIC | Age: 69
End: 2024-06-21
Payer: MEDICARE

## 2024-06-21 VITALS
WEIGHT: 205.4 LBS | BODY MASS INDEX: 27.86 KG/M2 | SYSTOLIC BLOOD PRESSURE: 120 MMHG | OXYGEN SATURATION: 98 % | DIASTOLIC BLOOD PRESSURE: 60 MMHG | HEART RATE: 89 BPM

## 2024-06-21 DIAGNOSIS — E78.5 HYPERLIPIDEMIA ASSOCIATED WITH TYPE 2 DIABETES MELLITUS (HCC): ICD-10-CM

## 2024-06-21 DIAGNOSIS — E11.29 MICROALBUMINURIA DUE TO TYPE 2 DIABETES MELLITUS (HCC): ICD-10-CM

## 2024-06-21 DIAGNOSIS — E11.65 TYPE 2 DIABETES MELLITUS WITH HYPERGLYCEMIA, WITHOUT LONG-TERM CURRENT USE OF INSULIN (HCC): Primary | ICD-10-CM

## 2024-06-21 DIAGNOSIS — E11.69 HYPERLIPIDEMIA ASSOCIATED WITH TYPE 2 DIABETES MELLITUS (HCC): ICD-10-CM

## 2024-06-21 DIAGNOSIS — E11.65 TYPE 2 DIABETES MELLITUS WITH HYPERGLYCEMIA, WITHOUT LONG-TERM CURRENT USE OF INSULIN (HCC): ICD-10-CM

## 2024-06-21 DIAGNOSIS — R80.9 MICROALBUMINURIA DUE TO TYPE 2 DIABETES MELLITUS (HCC): ICD-10-CM

## 2024-06-21 DIAGNOSIS — E11.59 HYPERTENSION ASSOCIATED WITH DIABETES (HCC): ICD-10-CM

## 2024-06-21 DIAGNOSIS — I15.2 HYPERTENSION ASSOCIATED WITH DIABETES (HCC): ICD-10-CM

## 2024-06-21 LAB
ALBUMIN SERPL-MCNC: 4.7 G/DL (ref 3.4–5)
ALBUMIN/GLOB SERPL: 2 {RATIO} (ref 1.1–2.2)
ALP SERPL-CCNC: 114 U/L (ref 40–129)
ALT SERPL-CCNC: 15 U/L (ref 10–40)
ANION GAP SERPL CALCULATED.3IONS-SCNC: 12 MMOL/L (ref 3–16)
AST SERPL-CCNC: 14 U/L (ref 15–37)
BILIRUB SERPL-MCNC: 0.4 MG/DL (ref 0–1)
BUN SERPL-MCNC: 19 MG/DL (ref 7–20)
CALCIUM SERPL-MCNC: 10.3 MG/DL (ref 8.3–10.6)
CHLORIDE SERPL-SCNC: 99 MMOL/L (ref 99–110)
CHOLEST SERPL-MCNC: 249 MG/DL (ref 0–199)
CO2 SERPL-SCNC: 27 MMOL/L (ref 21–32)
CREAT SERPL-MCNC: 0.6 MG/DL (ref 0.8–1.3)
CREATININE URINE POCT: NORMAL
GFR SERPLBLD CREATININE-BSD FMLA CKD-EPI: >90 ML/MIN/{1.73_M2}
GLUCOSE SERPL-MCNC: 256 MG/DL (ref 70–99)
HDLC SERPL-MCNC: 55 MG/DL (ref 40–60)
LDL CHOLESTEROL: 167 MG/DL
MICROALBUMIN/CREAT 24H UR: NORMAL MG/DL
MICROALBUMIN/CREAT UR-RTO: NORMAL MG/G
POTASSIUM SERPL-SCNC: 4.6 MMOL/L (ref 3.5–5.1)
PROT SERPL-MCNC: 7.1 G/DL (ref 6.4–8.2)
SODIUM SERPL-SCNC: 138 MMOL/L (ref 136–145)
TRIGL SERPL-MCNC: 134 MG/DL (ref 0–150)
VLDLC SERPL CALC-MCNC: 27 MG/DL

## 2024-06-21 PROCEDURE — 82044 UR ALBUMIN SEMIQUANTITATIVE: CPT | Performed by: STUDENT IN AN ORGANIZED HEALTH CARE EDUCATION/TRAINING PROGRAM

## 2024-06-21 PROCEDURE — 99214 OFFICE O/P EST MOD 30 MIN: CPT | Performed by: STUDENT IN AN ORGANIZED HEALTH CARE EDUCATION/TRAINING PROGRAM

## 2024-06-21 PROCEDURE — 3017F COLORECTAL CA SCREEN DOC REV: CPT | Performed by: STUDENT IN AN ORGANIZED HEALTH CARE EDUCATION/TRAINING PROGRAM

## 2024-06-21 PROCEDURE — G8419 CALC BMI OUT NRM PARAM NOF/U: HCPCS | Performed by: STUDENT IN AN ORGANIZED HEALTH CARE EDUCATION/TRAINING PROGRAM

## 2024-06-21 PROCEDURE — 3078F DIAST BP <80 MM HG: CPT | Performed by: STUDENT IN AN ORGANIZED HEALTH CARE EDUCATION/TRAINING PROGRAM

## 2024-06-21 PROCEDURE — 3074F SYST BP LT 130 MM HG: CPT | Performed by: STUDENT IN AN ORGANIZED HEALTH CARE EDUCATION/TRAINING PROGRAM

## 2024-06-21 PROCEDURE — 3046F HEMOGLOBIN A1C LEVEL >9.0%: CPT | Performed by: STUDENT IN AN ORGANIZED HEALTH CARE EDUCATION/TRAINING PROGRAM

## 2024-06-21 PROCEDURE — 1036F TOBACCO NON-USER: CPT | Performed by: STUDENT IN AN ORGANIZED HEALTH CARE EDUCATION/TRAINING PROGRAM

## 2024-06-21 PROCEDURE — G8427 DOCREV CUR MEDS BY ELIG CLIN: HCPCS | Performed by: STUDENT IN AN ORGANIZED HEALTH CARE EDUCATION/TRAINING PROGRAM

## 2024-06-21 PROCEDURE — 1123F ACP DISCUSS/DSCN MKR DOCD: CPT | Performed by: STUDENT IN AN ORGANIZED HEALTH CARE EDUCATION/TRAINING PROGRAM

## 2024-06-21 PROCEDURE — 2022F DILAT RTA XM EVC RTNOPTHY: CPT | Performed by: STUDENT IN AN ORGANIZED HEALTH CARE EDUCATION/TRAINING PROGRAM

## 2024-06-21 RX ORDER — BLOOD-GLUCOSE SENSOR
1 EACH MISCELLANEOUS
Qty: 6 EACH | Refills: 1 | Status: SHIPPED | OUTPATIENT
Start: 2024-06-21

## 2024-06-21 RX ORDER — GLIPIZIDE 10 MG/1
10 TABLET ORAL 2 TIMES DAILY
Qty: 180 TABLET | Refills: 1 | Status: SHIPPED | OUTPATIENT
Start: 2024-06-21

## 2024-06-21 RX ORDER — DAPAGLIFLOZIN 10 MG/1
10 TABLET, FILM COATED ORAL EVERY MORNING
Qty: 90 TABLET | Refills: 1 | Status: SHIPPED | OUTPATIENT
Start: 2024-06-21

## 2024-06-21 SDOH — ECONOMIC STABILITY: FOOD INSECURITY: WITHIN THE PAST 12 MONTHS, YOU WORRIED THAT YOUR FOOD WOULD RUN OUT BEFORE YOU GOT MONEY TO BUY MORE.: NEVER TRUE

## 2024-06-21 SDOH — ECONOMIC STABILITY: FOOD INSECURITY: WITHIN THE PAST 12 MONTHS, THE FOOD YOU BOUGHT JUST DIDN'T LAST AND YOU DIDN'T HAVE MONEY TO GET MORE.: NEVER TRUE

## 2024-06-21 SDOH — ECONOMIC STABILITY: INCOME INSECURITY: HOW HARD IS IT FOR YOU TO PAY FOR THE VERY BASICS LIKE FOOD, HOUSING, MEDICAL CARE, AND HEATING?: NOT HARD AT ALL

## 2024-06-21 NOTE — PROGRESS NOTES
Assessment:  Encounter Diagnoses   Name Primary?    Type 2 diabetes mellitus with hyperglycemia, without long-term current use of insulin (HCC) Yes    Hypertension associated with diabetes (HCC)     Microalbuminuria due to type 2 diabetes mellitus (HCC)     Hyperlipidemia associated with type 2 diabetes mellitus (HCC)        Plan:  1. Type 2 diabetes mellitus with hyperglycemia, without long-term current use of insulin (HCC)  -     dapagliflozin (FARXIGA) 10 MG tablet; Take 1 tablet by mouth every morning, Disp-90 tablet, R-1Print  -     glipiZIDE (GLUCOTROL) 10 MG tablet; Take 1 tablet by mouth 2 times daily, Disp-180 tablet, R-1Normal  -     Continuous Glucose Sensor (FREESTYLE RAMU 3 SENSOR) MISC; 1 each by Does not apply route every 14 days, Disp-6 each, R-1Print  -     [facility] Primary [facility] Pharmacist - [facility]  -     External Referral to Dietitian  -     Lipid, Fasting; Future  -     Comprehensive Metabolic Panel; Future  -     POCT microalbumin  2. Hypertension associated with diabetes (HCC)  3. Microalbuminuria due to type 2 diabetes mellitus (HCC)  4. Hyperlipidemia associated with type 2 diabetes mellitus (HCC)  Last A1c 11.3. Previously following with endo but no longer wishes to continue. Will stop jardiance given cost and start farxiga. Add glipizide. Will enroll in diabetic maris program. Check lab work as above.        Return in about 4 weeks (around 7/19/2024) for diabetes.      Patient: Bipin Caballero is a 69 y.o. male who presents today with the following Chief Complaint(s):  No chief complaint on file.        HPI    DM2  Metformin 1000mg BID  Jardiance 25mg - $500 a month and unaffordable.   Lantus 18U nightly  Glipizide 10mg BID since stopping jardiance  BS running over 300 at times    Rotational Vertigo  Has previously seen ENT and therapy without improvement  Now following with Dr. Alvarez at [facility] or further evaluation      Current Outpatient Medications   Medication Sig Dispense

## 2024-07-01 ENCOUNTER — TELEPHONE (OUTPATIENT)
Dept: FAMILY MEDICINE CLINIC | Age: 69
End: 2024-07-01

## 2024-07-01 NOTE — TELEPHONE ENCOUNTER
Called patient to discuss DM maris.    Reviewed DM maris expectations and program requirements. Patient verbalized understanding and is agreeable to participation.      Patient scheduled with RDN and Pharmacist. Referrals placed per protocol. SCODI assigned via [employer].     Documents faxed to nutrition adv.     Patient voiced needing assistance with Darion 3, Jardiance or Farxiga. Currently on glipizide. Informed we will look into assistance options and that his Darion 3 needs to go to DME supplier d/t his insurance. I will place order on behalf of PCP to  Medical via [employer] online portal.

## 2024-07-03 NOTE — TELEPHONE ENCOUNTER
In order to qualify for assistance on Jardiance, patient's gross income needs to be <250% of FPG    For Farxiga, patient's gross income must be <300% FPG.    Will discuss further at upcoming pharmD appt

## 2024-07-17 NOTE — PROGRESS NOTES
Clinical Pharmacy Note    Bipin Caballero is a 69 y.o. male who has been referred by Yanick De La Vega DO to the clinical pharmacist through the diabetes maris for medication management. Bipin Caballero was seen today for an initial diabetes management visit. Patient acknowledges working in consult agreement with clinical pharmacist and this provider.    Past Medical History:   Diagnosis Date    Allergic rhinitis     Diabetes mellitus (HCC)     Dizziness     Hearing loss     Hyperlipidemia     Hypertension       Family History   Problem Relation Age of Onset    Cancer Mother         breast    Celiac Disease Daughter     Hashimoto Thyroiditis Daughter      Patient insurance: Health system medicare   Patient's preferred pharmacy: [facility] mail order     REVIEW OF DIABETES & LIFESTYLE  Bipin Caballero is here for an evaluation of their type II diabetes mellitus. The patient was diagnosed in his 40's. 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 Today he reports no symptoms from hyperglycemia.     He was last seen by Yanick De La Vega DO on 6/21/2024 for diabetes management. Last A1c was 11.3%. He has followed with endocrinology in the past but is no longer planning to do so. His Jardiance was discontinued and replaced with Farxiga. He has not started Farxiga due to cost. The patient has not had any recent ED visits or hospitalizations due to diabetes. Last hospitalization was November 2022 for DKA.     Reviewed signs and symptoms of hypoglycemia with patient. The patient reports that they do not have any episodes of hypoglycemia. He has in the past and reported having shaking, dizziness, and sweating. Reviewed how to treat hypoglycemic episodes with the rule of 15. Patient had no questions on how to treat.     The patient was set up in the office visit today with Darion 3 CGM. Sensor placed and account created and connected to practice page. He

## 2024-07-18 ENCOUNTER — ENROLLMENT (OUTPATIENT)
Dept: FAMILY MEDICINE CLINIC | Age: 69
End: 2024-07-18

## 2024-07-18 ENCOUNTER — PHARMACY VISIT (OUTPATIENT)
Dept: FAMILY MEDICINE CLINIC | Age: 69
End: 2024-07-18

## 2024-07-18 DIAGNOSIS — E11.65 TYPE 2 DIABETES MELLITUS WITH HYPERGLYCEMIA, WITHOUT LONG-TERM CURRENT USE OF INSULIN (HCC): Primary | ICD-10-CM

## 2024-07-18 LAB — HBA1C MFR BLD: 11.5 %

## 2024-07-30 ENCOUNTER — OFFICE VISIT (OUTPATIENT)
Dept: FAMILY MEDICINE CLINIC | Age: 69
End: 2024-07-30
Payer: MEDICARE

## 2024-07-30 VITALS
SYSTOLIC BLOOD PRESSURE: 128 MMHG | OXYGEN SATURATION: 98 % | HEIGHT: 72 IN | DIASTOLIC BLOOD PRESSURE: 70 MMHG | HEART RATE: 91 BPM | BODY MASS INDEX: 28.58 KG/M2 | WEIGHT: 211 LBS

## 2024-07-30 DIAGNOSIS — E11.65 TYPE 2 DIABETES MELLITUS WITH HYPERGLYCEMIA, WITHOUT LONG-TERM CURRENT USE OF INSULIN (HCC): Primary | ICD-10-CM

## 2024-07-30 PROCEDURE — G8427 DOCREV CUR MEDS BY ELIG CLIN: HCPCS | Performed by: STUDENT IN AN ORGANIZED HEALTH CARE EDUCATION/TRAINING PROGRAM

## 2024-07-30 PROCEDURE — 3074F SYST BP LT 130 MM HG: CPT | Performed by: STUDENT IN AN ORGANIZED HEALTH CARE EDUCATION/TRAINING PROGRAM

## 2024-07-30 PROCEDURE — 1123F ACP DISCUSS/DSCN MKR DOCD: CPT | Performed by: STUDENT IN AN ORGANIZED HEALTH CARE EDUCATION/TRAINING PROGRAM

## 2024-07-30 PROCEDURE — G8419 CALC BMI OUT NRM PARAM NOF/U: HCPCS | Performed by: STUDENT IN AN ORGANIZED HEALTH CARE EDUCATION/TRAINING PROGRAM

## 2024-07-30 PROCEDURE — 1036F TOBACCO NON-USER: CPT | Performed by: STUDENT IN AN ORGANIZED HEALTH CARE EDUCATION/TRAINING PROGRAM

## 2024-07-30 PROCEDURE — 3017F COLORECTAL CA SCREEN DOC REV: CPT | Performed by: STUDENT IN AN ORGANIZED HEALTH CARE EDUCATION/TRAINING PROGRAM

## 2024-07-30 PROCEDURE — 99214 OFFICE O/P EST MOD 30 MIN: CPT | Performed by: STUDENT IN AN ORGANIZED HEALTH CARE EDUCATION/TRAINING PROGRAM

## 2024-07-30 PROCEDURE — 3046F HEMOGLOBIN A1C LEVEL >9.0%: CPT | Performed by: STUDENT IN AN ORGANIZED HEALTH CARE EDUCATION/TRAINING PROGRAM

## 2024-07-30 PROCEDURE — 3078F DIAST BP <80 MM HG: CPT | Performed by: STUDENT IN AN ORGANIZED HEALTH CARE EDUCATION/TRAINING PROGRAM

## 2024-07-30 PROCEDURE — 2022F DILAT RTA XM EVC RTNOPTHY: CPT | Performed by: STUDENT IN AN ORGANIZED HEALTH CARE EDUCATION/TRAINING PROGRAM

## 2024-07-30 RX ORDER — INSULIN GLARGINE 100 [IU]/ML
25 INJECTION, SOLUTION SUBCUTANEOUS NIGHTLY
Qty: 15 ADJUSTABLE DOSE PRE-FILLED PEN SYRINGE | Refills: 1 | Status: SHIPPED | OUTPATIENT
Start: 2024-07-30

## 2024-07-30 NOTE — PROGRESS NOTES
Multiple Vitamin (MULTI-VITAMIN PO) Take  by mouth.        dapagliflozin (FARXIGA) 5 MG tablet Take 1 tablet by mouth every morning 90 tablet 0    dapagliflozin (FARXIGA) 10 MG tablet Take 1 tablet by mouth every morning 90 tablet 1     No current facility-administered medications for this visit.       Patient's past medical history, surgical history, family history, medications,  andallergies  were all reviewed and updated as appropriate today.      Review of Systems  All other systems reviewed and negative    Physical Exam  Vitals reviewed.   Constitutional:       Appearance: Normal appearance.   HENT:      Head: Normocephalic and atraumatic.   Cardiovascular:      Rate and Rhythm: Normal rate and regular rhythm.   Pulmonary:      Effort: Pulmonary effort is normal.      Breath sounds: Normal breath sounds.   Neurological:      General: No focal deficit present.      Mental Status: He is alert and oriented to person, place, and time.   Psychiatric:         Behavior: Behavior normal.         Thought Content: Thought content normal.       Visual inspection:  Deformity/amputation: absent  Skin lesions/pre-ulcerative calluses: absent  Edema: right- negative, left- negative    Sensory exam:  Monofilament sensation: normal  (minimum of 5 random plantar locations tested, avoiding callused areas - > 1 area with absence of sensation is + for neuropathy)    Plus at least one of the following:  Pulses: normal,     Vitals:    07/30/24 0930   BP: 128/70   Pulse: 91   SpO2: 98%       Please note that portions of this note may have been completed with a voice recognition program. Efforts were made to edit the dictations but occasionally words are mis-transcribed.

## 2024-08-02 RX ORDER — FLURBIPROFEN SODIUM 0.3 MG/ML
SOLUTION/ DROPS OPHTHALMIC
Qty: 100 EACH | Refills: 3 | Status: SHIPPED | OUTPATIENT
Start: 2024-08-02

## 2024-08-02 NOTE — TELEPHONE ENCOUNTER
Refill Request     CONFIRM preferred pharmacy with the patient.    If Mail Order Rx - Pend for 90 day refill.      Last Seen: Last Seen Department: 7/30/2024  Last Seen by PCP: 7/30/2024    Last Written: 11/13/2023    If no future appointment scheduled:  Review the last OV with PCP and review information for follow-up visit,  Route STAFF MESSAGE with patient name to the  Pool for scheduling with the following information:            -  Timing of next visit           -  Visit type ie Physical, OV, etc           -  Diagnoses/Reason ie. COPD, HTN - Do not use MEDICATION, Follow-up or CHECK UP - Give reason for visit      Next Appointment:   Future Appointments   Date Time Provider Department Center   8/30/2024 10:30 AM Yanick De La Vega DO [facility] DEP   9/16/2024 10:00 AM DIABETES EDUCATION, [facility] DEP   10/24/2024  2:00 PM EFM - PHARMD-DIABETES MINE [facility] DEP       Message sent to  to schedule appt with patient?  NO      Requested Prescriptions     Pending Prescriptions Disp Refills    ULTICARE MINI PEN NEEDLES 31G X 6 MM MISC 100 each      Sig: USE AS DIRECTED

## 2024-08-30 ENCOUNTER — OFFICE VISIT (OUTPATIENT)
Dept: FAMILY MEDICINE CLINIC | Age: 69
End: 2024-08-30
Payer: MEDICARE

## 2024-08-30 VITALS
BODY MASS INDEX: 28.74 KG/M2 | WEIGHT: 212.2 LBS | SYSTOLIC BLOOD PRESSURE: 130 MMHG | HEART RATE: 64 BPM | DIASTOLIC BLOOD PRESSURE: 72 MMHG | OXYGEN SATURATION: 98 % | TEMPERATURE: 96.9 F | HEIGHT: 72 IN

## 2024-08-30 DIAGNOSIS — E11.65 TYPE 2 DIABETES MELLITUS WITH HYPERGLYCEMIA, WITHOUT LONG-TERM CURRENT USE OF INSULIN (HCC): Primary | ICD-10-CM

## 2024-08-30 PROCEDURE — 1036F TOBACCO NON-USER: CPT | Performed by: STUDENT IN AN ORGANIZED HEALTH CARE EDUCATION/TRAINING PROGRAM

## 2024-08-30 PROCEDURE — G8419 CALC BMI OUT NRM PARAM NOF/U: HCPCS | Performed by: STUDENT IN AN ORGANIZED HEALTH CARE EDUCATION/TRAINING PROGRAM

## 2024-08-30 PROCEDURE — 3046F HEMOGLOBIN A1C LEVEL >9.0%: CPT | Performed by: STUDENT IN AN ORGANIZED HEALTH CARE EDUCATION/TRAINING PROGRAM

## 2024-08-30 PROCEDURE — 3017F COLORECTAL CA SCREEN DOC REV: CPT | Performed by: STUDENT IN AN ORGANIZED HEALTH CARE EDUCATION/TRAINING PROGRAM

## 2024-08-30 PROCEDURE — 3078F DIAST BP <80 MM HG: CPT | Performed by: STUDENT IN AN ORGANIZED HEALTH CARE EDUCATION/TRAINING PROGRAM

## 2024-08-30 PROCEDURE — 1123F ACP DISCUSS/DSCN MKR DOCD: CPT | Performed by: STUDENT IN AN ORGANIZED HEALTH CARE EDUCATION/TRAINING PROGRAM

## 2024-08-30 PROCEDURE — G8427 DOCREV CUR MEDS BY ELIG CLIN: HCPCS | Performed by: STUDENT IN AN ORGANIZED HEALTH CARE EDUCATION/TRAINING PROGRAM

## 2024-08-30 PROCEDURE — 2022F DILAT RTA XM EVC RTNOPTHY: CPT | Performed by: STUDENT IN AN ORGANIZED HEALTH CARE EDUCATION/TRAINING PROGRAM

## 2024-08-30 PROCEDURE — 99214 OFFICE O/P EST MOD 30 MIN: CPT | Performed by: STUDENT IN AN ORGANIZED HEALTH CARE EDUCATION/TRAINING PROGRAM

## 2024-08-30 PROCEDURE — 3075F SYST BP GE 130 - 139MM HG: CPT | Performed by: STUDENT IN AN ORGANIZED HEALTH CARE EDUCATION/TRAINING PROGRAM

## 2024-08-30 RX ORDER — FLURBIPROFEN SODIUM 0.3 MG/ML
SOLUTION/ DROPS OPHTHALMIC
Qty: 100 EACH | Refills: 3 | Status: SHIPPED | OUTPATIENT
Start: 2024-08-30

## 2024-08-30 NOTE — PROGRESS NOTES
Assessment:  Encounter Diagnosis   Name Primary?    Type 2 diabetes mellitus with hyperglycemia, without long-term current use of insulin (HCC) Yes       Plan:  1. Type 2 diabetes mellitus with hyperglycemia, without long-term current use of insulin (HCC)  -     ULTICARE MINI PEN NEEDLES 31G X 6 MM MISC; Disp-100 each, R-3, ALFREDO, NormalUse one needle daily to administer lantus solostar.  Will increase lantus to 28 units and work on modifying dinner to decrease overall carbohydrates. Follow up in 1 month.        Return in about 4 weeks (around 9/27/2024) for diabetes.      Patient: Bipin Caballero is a 69 y.o. male who presents today with the following Chief Complaint(s):  Chief Complaint   Patient presents with    Other         HPI    DM2  Lantus 25u nightly  Glipizide 10mg BID  Metformin 100mg Bid    CGM with TIT 56% and above 44% in last 14 days. Typical hyperglycemia after dinner until 6am. NO hypoglycemic episodes.   Current Outpatient Medications   Medication Sig Dispense Refill    ULTICARE MINI PEN NEEDLES 31G X 6 MM MISC Use one needle daily to administer lantus solostar. 100 each 3    insulin glargine (LANTUS SOLOSTAR) 100 UNIT/ML injection pen Inject 25 Units into the skin nightly 15 Adjustable Dose Pre-filled Pen Syringe 1    glipiZIDE (GLUCOTROL) 10 MG tablet Take 1 tablet by mouth 2 times daily 180 tablet 1    Continuous Glucose Sensor (FREESTYLE RAMU 3 SENSOR) MISC 1 each by Does not apply route every 14 days 6 each 1    lisinopril (PRINIVIL;ZESTRIL) 30 MG tablet Take 1 tablet by mouth daily 90 tablet 1    metFORMIN (GLUCOPHAGE) 1000 MG tablet Take 1 tablet by mouth 2 times daily (with meals) 180 tablet 1    Omega-3 Fatty Acids (FISH OIL) 1000 MG CAPS Take 1 capsule by mouth in the morning and at bedtime      aspirin 81 MG EC tablet Take 1 tablet by mouth daily      CINNAMON PO Take 1,000 mg by mouth 2 times daily.        Multiple Vitamin (MULTI-VITAMIN PO) Take  by mouth.         No current

## 2024-10-01 ENCOUNTER — OFFICE VISIT (OUTPATIENT)
Dept: FAMILY MEDICINE CLINIC | Age: 69
End: 2024-10-01
Payer: MEDICARE

## 2024-10-01 VITALS
HEART RATE: 89 BPM | DIASTOLIC BLOOD PRESSURE: 70 MMHG | HEIGHT: 72 IN | BODY MASS INDEX: 29.39 KG/M2 | SYSTOLIC BLOOD PRESSURE: 132 MMHG | WEIGHT: 217 LBS | OXYGEN SATURATION: 98 %

## 2024-10-01 DIAGNOSIS — I15.2 HYPERTENSION ASSOCIATED WITH DIABETES (HCC): ICD-10-CM

## 2024-10-01 DIAGNOSIS — E11.59 HYPERTENSION ASSOCIATED WITH DIABETES (HCC): ICD-10-CM

## 2024-10-01 DIAGNOSIS — E78.5 HYPERLIPIDEMIA ASSOCIATED WITH TYPE 2 DIABETES MELLITUS (HCC): ICD-10-CM

## 2024-10-01 DIAGNOSIS — R80.9 MICROALBUMINURIA DUE TO TYPE 2 DIABETES MELLITUS (HCC): ICD-10-CM

## 2024-10-01 DIAGNOSIS — E11.29 MICROALBUMINURIA DUE TO TYPE 2 DIABETES MELLITUS (HCC): ICD-10-CM

## 2024-10-01 DIAGNOSIS — E11.65 TYPE 2 DIABETES MELLITUS WITH HYPERGLYCEMIA, WITHOUT LONG-TERM CURRENT USE OF INSULIN (HCC): Primary | ICD-10-CM

## 2024-10-01 DIAGNOSIS — E11.69 HYPERLIPIDEMIA ASSOCIATED WITH TYPE 2 DIABETES MELLITUS (HCC): ICD-10-CM

## 2024-10-01 PROCEDURE — 1123F ACP DISCUSS/DSCN MKR DOCD: CPT | Performed by: STUDENT IN AN ORGANIZED HEALTH CARE EDUCATION/TRAINING PROGRAM

## 2024-10-01 PROCEDURE — 1036F TOBACCO NON-USER: CPT | Performed by: STUDENT IN AN ORGANIZED HEALTH CARE EDUCATION/TRAINING PROGRAM

## 2024-10-01 PROCEDURE — G8419 CALC BMI OUT NRM PARAM NOF/U: HCPCS | Performed by: STUDENT IN AN ORGANIZED HEALTH CARE EDUCATION/TRAINING PROGRAM

## 2024-10-01 PROCEDURE — 3075F SYST BP GE 130 - 139MM HG: CPT | Performed by: STUDENT IN AN ORGANIZED HEALTH CARE EDUCATION/TRAINING PROGRAM

## 2024-10-01 PROCEDURE — 3017F COLORECTAL CA SCREEN DOC REV: CPT | Performed by: STUDENT IN AN ORGANIZED HEALTH CARE EDUCATION/TRAINING PROGRAM

## 2024-10-01 PROCEDURE — 2022F DILAT RTA XM EVC RTNOPTHY: CPT | Performed by: STUDENT IN AN ORGANIZED HEALTH CARE EDUCATION/TRAINING PROGRAM

## 2024-10-01 PROCEDURE — 3046F HEMOGLOBIN A1C LEVEL >9.0%: CPT | Performed by: STUDENT IN AN ORGANIZED HEALTH CARE EDUCATION/TRAINING PROGRAM

## 2024-10-01 PROCEDURE — 3078F DIAST BP <80 MM HG: CPT | Performed by: STUDENT IN AN ORGANIZED HEALTH CARE EDUCATION/TRAINING PROGRAM

## 2024-10-01 PROCEDURE — G8484 FLU IMMUNIZE NO ADMIN: HCPCS | Performed by: STUDENT IN AN ORGANIZED HEALTH CARE EDUCATION/TRAINING PROGRAM

## 2024-10-01 PROCEDURE — 99213 OFFICE O/P EST LOW 20 MIN: CPT | Performed by: STUDENT IN AN ORGANIZED HEALTH CARE EDUCATION/TRAINING PROGRAM

## 2024-10-01 PROCEDURE — G8427 DOCREV CUR MEDS BY ELIG CLIN: HCPCS | Performed by: STUDENT IN AN ORGANIZED HEALTH CARE EDUCATION/TRAINING PROGRAM

## 2024-10-01 NOTE — PROGRESS NOTES
Assessment:  Encounter Diagnoses   Name Primary?    Type 2 diabetes mellitus with hyperglycemia, without long-term current use of insulin (HCC) Yes    Hypertension associated with diabetes (HCC)     Hyperlipidemia associated with type 2 diabetes mellitus (HCC)     Microalbuminuria due to type 2 diabetes mellitus (HCC)        Plan:  1. Type 2 diabetes mellitus with hyperglycemia, without long-term current use of insulin (HCC)  2. Hypertension associated with diabetes (HCC)  3. Hyperlipidemia associated with type 2 diabetes mellitus (HCC)  4. Microalbuminuria due to type 2 diabetes mellitus (HCC)  Patient without CGM data due to sensor falling off frequently.  Recommended starting to use additional adhesive covering and showed examples on [employer].  Patient with reported hypoglycemia overnight and discussed recommendation to decrease nighttime glipizide to 5 mg however patient wishes to continue with current dosage and 27 units of Lantus.  Recommended making sure CGM is on if continuing to take nighttime glipizide at this dose to monitor      No follow-ups on file.      Patient: Bipin Caballero is a 69 y.o. male who presents today with the following Chief Complaint(s):  Chief Complaint   Patient presents with    Diabetes         HPI    DM2  Lantus 25u  --> 28U at last visit.   Wished to avoid mealtime insulin and instead adjust dinner which was most frequent hyperglycemia.  Glipizide 10mg BID  Metformin 100mg Bid  Jardiance 25mg - Has not taken due to cost   Having difficulty keeping sensor attached  Has had episodes of hypoglycemia with radings as low as 58.     Cervical myelopathy   C3-C7  Following with   Planning CT for surgical planning  Likely posterior cervical decompression and fusion    Current Outpatient Medications   Medication Sig Dispense Refill    ULTICARE MINI PEN NEEDLES 31G X 6 MM MISC Use one needle daily to administer lantus solostar. 100 each 3    insulin glargine (LANTUS SOLOSTAR) 100 UNIT/ML

## 2024-10-23 NOTE — PROGRESS NOTES
hypertension. Currently denies any shortness of breath, headache, lightheadedness, and blurred vision.  Patient does not check blood pressure at home.   Patient is on an ACE/ARBi - lisinopril 30mg    Blood pressure goal: <130/80 mmHg (ASCVD risk >15% or history of CVD)  BP Readings from Last 3 Encounters:   10/01/24 132/70   08/30/24 130/72   07/30/24 128/70     Hyperlipidemia:  Patient does have a diagnosis of hyperlipidemia.  Patient has not had a previous CV event.  Cardiovascular risk factors: advanced age (older than 55 for men, 65 for women), diabetes mellitus, dyslipidemia, hypertension, male gender, microalbuminuria, and smoking/ tobacco exposure  The patient is not on a statin medication - he was previously on atorvastatin 20mg but took himself off as he was concerned about potential side effects.     The 10-year ASCVD risk score (Raheem THORNTON, et al., 2019) is: 34.1%    Values used to calculate the score:      Age: 69 years      Sex: Male      Is Non- : No      Diabetic: Yes      Tobacco smoker: No      Systolic Blood Pressure: 132 mmHg      Is BP treated: Yes      HDL Cholesterol: 55 mg/dL      Total Cholesterol: 249 mg/dL    Patient's goal LDL: <70 mg/dL  Lab Results   Component Value Date    CHOL 125 11/15/2021    CHOL 219 (H) 06/05/2015    CHOL 105 03/05/2013     Lab Results   Component Value Date    TRIG 220 11/15/2021    TRIG 327 (H) 06/05/2015    TRIG 154 (H) 03/05/2013     Lab Results   Component Value Date    HDL 55 06/21/2024    HDL 34 (A) 11/15/2021    HDL 36 (L) 06/05/2015     Lab Results   Component Value Date     (H) 06/21/2024    LDLDIRECT 142 (H) 06/05/2015     Lab Results   Component Value Date    VLDL 27 06/21/2024    VLDL see below 06/05/2015    VLDL 31 03/05/2013     MEDICATIONS  Current Outpatient Medications   Medication Sig Dispense Refill    ULTICARE MINI PEN NEEDLES 31G X 6 MM MISC Use one needle daily to administer lantus solostar. 100 each 3    insulin

## 2024-10-24 ENCOUNTER — PHARMACY VISIT (OUTPATIENT)
Dept: FAMILY MEDICINE CLINIC | Age: 69
End: 2024-10-24

## 2024-10-24 DIAGNOSIS — E11.65 TYPE 2 DIABETES MELLITUS WITH HYPERGLYCEMIA, WITHOUT LONG-TERM CURRENT USE OF INSULIN (HCC): Primary | ICD-10-CM

## 2024-10-30 ENCOUNTER — OFFICE VISIT (OUTPATIENT)
Dept: FAMILY MEDICINE CLINIC | Age: 69
End: 2024-10-30

## 2024-10-30 VITALS
DIASTOLIC BLOOD PRESSURE: 60 MMHG | OXYGEN SATURATION: 96 % | WEIGHT: 217.2 LBS | HEIGHT: 72 IN | TEMPERATURE: 97.1 F | HEART RATE: 95 BPM | BODY MASS INDEX: 29.42 KG/M2 | SYSTOLIC BLOOD PRESSURE: 124 MMHG

## 2024-10-30 DIAGNOSIS — Z79.4 TYPE 2 DIABETES MELLITUS WITH HYPERGLYCEMIA, WITH LONG-TERM CURRENT USE OF INSULIN (HCC): Primary | ICD-10-CM

## 2024-10-30 DIAGNOSIS — E11.29 MICROALBUMINURIA DUE TO TYPE 2 DIABETES MELLITUS (HCC): ICD-10-CM

## 2024-10-30 DIAGNOSIS — I15.2 HYPERTENSION ASSOCIATED WITH DIABETES (HCC): ICD-10-CM

## 2024-10-30 DIAGNOSIS — E78.5 HYPERLIPIDEMIA ASSOCIATED WITH TYPE 2 DIABETES MELLITUS (HCC): ICD-10-CM

## 2024-10-30 DIAGNOSIS — E11.65 TYPE 2 DIABETES MELLITUS WITH HYPERGLYCEMIA, WITH LONG-TERM CURRENT USE OF INSULIN (HCC): Primary | ICD-10-CM

## 2024-10-30 DIAGNOSIS — E11.69 HYPERLIPIDEMIA ASSOCIATED WITH TYPE 2 DIABETES MELLITUS (HCC): ICD-10-CM

## 2024-10-30 DIAGNOSIS — E11.59 HYPERTENSION ASSOCIATED WITH DIABETES (HCC): ICD-10-CM

## 2024-10-30 DIAGNOSIS — E11.42 DIABETIC PERIPHERAL NEUROPATHY (HCC): ICD-10-CM

## 2024-10-30 DIAGNOSIS — R80.9 MICROALBUMINURIA DUE TO TYPE 2 DIABETES MELLITUS (HCC): ICD-10-CM

## 2024-10-30 PROBLEM — E11.10 DKA, TYPE 2, NOT AT GOAL (HCC): Status: RESOLVED | Noted: 2022-11-01 | Resolved: 2024-10-30

## 2024-10-30 LAB
CREATININE URINE POCT: 50
HBA1C MFR BLD: 8.1 %
MICROALBUMIN/CREAT 24H UR: 150 MG/DL
MICROALBUMIN/CREAT UR-RTO: >300 MG/G

## 2024-10-30 RX ORDER — ROSUVASTATIN CALCIUM 10 MG/1
10 TABLET, COATED ORAL DAILY
Qty: 90 TABLET | Refills: 1 | Status: SHIPPED | OUTPATIENT
Start: 2024-10-30

## 2024-10-30 RX ORDER — DAPAGLIFLOZIN 5 MG/1
5 TABLET, FILM COATED ORAL EVERY MORNING
Qty: 90 TABLET | Refills: 0 | Status: SHIPPED | OUTPATIENT
Start: 2024-10-30

## 2024-10-30 NOTE — PROGRESS NOTES
Assessment:  Encounter Diagnoses   Name Primary?    Type 2 diabetes mellitus with hyperglycemia, with long-term current use of insulin (HCC) Yes    Hypertension associated with diabetes (HCC)     Hyperlipidemia associated with type 2 diabetes mellitus (HCC)     Microalbuminuria due to type 2 diabetes mellitus (HCC)     Diabetic peripheral neuropathy (HCC)        Plan:  1. Type 2 diabetes mellitus with hyperglycemia, with long-term current use of insulin (HCC)  -     POCT glycosylated hemoglobin (Hb A1C)  -     POCT microalbumin  -     dapagliflozin (FARXIGA) 5 MG tablet; Take 1 tablet by mouth every morning, Disp-90 tablet, R-0Normal  -     rosuvastatin (CRESTOR) 10 MG tablet; Take 1 tablet by mouth daily, Disp-90 tablet, R-1Normal  -     Diabetic Foot Exam  2. Hypertension associated with diabetes (HCC)  3. Hyperlipidemia associated with type 2 diabetes mellitus (HCC)  4. Microalbuminuria due to type 2 diabetes mellitus (HCC)  5. Diabetic peripheral neuropathy (HCC)  A1c has improved from 11.5-8.1 over the last 3 months.  Patient working to improve compliance with insulin and has done a good job with maintaining less severe hyperglycemic spikes.  Will add farxiga for further improvement.  Also willing to start Crestor.  Previous intolerance to Lipitor.      No follow-ups on file.      Patient: Bipin Caballero is a 69 y.o. male who presents today with the following Chief Complaint(s):  Chief Complaint   Patient presents with    Follow-up    Diabetes         HPI    DM2  Lantus 25u  --> 27U at last visit.   Wished to avoid mealtime insulin and instead adjust dinner which was most frequent hyperglycemia.  Glipizide 10mg BID  Metformin 100mg Bid  Jardiance 25mg - Has not taken due to cost   Having difficulty keeping sensor attached  Has had episodes of hypoglycemia with radings as low as 58.       Htn  Lisinopril 30mg    HLD  Previously on lipitor - stopped  Last        he is hesitant about trying SGLT-2 due to

## 2024-11-02 PROBLEM — E11.42 DIABETIC PERIPHERAL NEUROPATHY (HCC): Status: ACTIVE | Noted: 2024-11-02

## 2024-11-13 ENCOUNTER — TELEPHONE (OUTPATIENT)
Dept: FAMILY MEDICINE CLINIC | Age: 69
End: 2024-11-13

## 2024-11-13 NOTE — TELEPHONE ENCOUNTER
[facility].     Status is pending. Rep sent a message to the processing department asking to expedite the application since it was sent a few weeks ago. They will fax us a letter in a few days to let us know if he is approved or not.

## 2024-11-25 NOTE — TELEPHONE ENCOUNTER
[facility] status, they will have application completed by end of day to day and will fax us a response.

## 2024-12-12 NOTE — TELEPHONE ENCOUNTER
Still no response from [employer], called to follow up on application     Approved through 12/31/2025, medication being processed for shipping.     Called patient, informed of approval and that we will be in touch once his medication comes in

## 2024-12-17 ENCOUNTER — TELEPHONE (OUTPATIENT)
Dept: FAMILY MEDICINE CLINIC | Age: 69
End: 2024-12-17

## 2024-12-17 NOTE — TELEPHONE ENCOUNTER
Nurse placed a call out to the patient to make him aware that [facility]  and it is ready for him to pick it up.  Patient called no answer, left a message for the patient to return the call to the office.

## 2025-01-14 DIAGNOSIS — E11.65 TYPE 2 DIABETES MELLITUS WITH HYPERGLYCEMIA, WITH LONG-TERM CURRENT USE OF INSULIN (HCC): ICD-10-CM

## 2025-01-14 DIAGNOSIS — Z79.4 TYPE 2 DIABETES MELLITUS WITH HYPERGLYCEMIA, WITH LONG-TERM CURRENT USE OF INSULIN (HCC): ICD-10-CM

## 2025-01-14 NOTE — TELEPHONE ENCOUNTER
Refill Request     CONFIRM preferred pharmacy with the patient.    If Mail Order Rx - Pend for 90 day refill.      Last Seen: Last Seen Department: 10/30/2024  Last Seen by PCP: 10/30/2024    Last Written: 10/30/24 90 with no refills     If no future appointment scheduled:  Review the last OV with PCP and review information for follow-up visit,  Route STAFF MESSAGE with patient name to the  Pool for scheduling with the following information:            -  Timing of next visit           -  Visit type ie Physical, OV, etc           -  Diagnoses/Reason ie. COPD, HTN - Do not use MEDICATION, Follow-up or CHECK UP - Give reason for visit      Next Appointment:   Future Appointments   Date Time Provider Department Center   1/30/2025  2:00 PM EFM - [facility] DEP   2/6/2025 10:00 AM Yanick De La Vega DO [facility] DEP       Message sent to  to schedule appt with patient?  NO      Requested Prescriptions     Pending Prescriptions Disp Refills    FARXIGA 5 MG tablet [Pharmacy Med Name: Farxiga 5 MG Oral Tablet] 90 tablet 3     Sig: TAKE 1 TABLET BY MOUTH EVERY  MORNING

## 2025-01-15 RX ORDER — DAPAGLIFLOZIN 5 MG/1
5 TABLET, FILM COATED ORAL EVERY MORNING
Qty: 90 TABLET | Refills: 3 | Status: SHIPPED | OUTPATIENT
Start: 2025-01-15

## 2025-02-13 ENCOUNTER — TELEPHONE (OUTPATIENT)
Dept: FAMILY MEDICINE CLINIC | Age: 70
End: 2025-02-13

## 2025-02-13 NOTE — TELEPHONE ENCOUNTER
Called patient to discuss scheduling with RDN, PharmD and PCP.    Patient currently in rehab from procedure but should be discharged tomorrow.     Informed patient I will call him on Monday to schedule.

## 2025-02-17 ENCOUNTER — TELEPHONE (OUTPATIENT)
Dept: FAMILY MEDICINE CLINIC | Age: 70
End: 2025-02-17

## 2025-02-17 NOTE — TELEPHONE ENCOUNTER
Care Transitions Initial Follow Up Call    Outreach made within 2 business days of discharge: Yes    Patient: Bipin Caballero Patient : 1955   MRN: [id number]  Reason for Admission: Spinal fusion on 25  Discharge Date: 25 from [facility]         Spoke with: Patient    Discharge department/facility: [facility]     TCM Interactive Patient Contact:  Was patient able to fill all prescriptions: Yes  Was patient instructed to bring all medications to the follow-up visit: Yes  Is patient taking all medications as directed in the discharge summary? Yes  Does patient understand their discharge instructions: Yes  Does patient have questions or concerns that need addressed prior to 7-14 day follow up office visit: no    Additional needs identified to be addressed with provider  No needs identified             Scheduled appointment with PCP within 7-14 days    Follow Up  Future Appointments   Date Time Provider Department Center   2025 11:00 AM Yanick De La Vega DO [facility] DEP   3/27/2025  2:30 PM EFM - PHARMD-DIABETES [facility] DEP   2025 10:00 AM DIABETES EDUCATION, [facility] DEP       Kennedi Ramires RN

## 2025-02-19 ENCOUNTER — TELEPHONE (OUTPATIENT)
Dept: FAMILY MEDICINE CLINIC | Age: 70
End: 2025-02-19

## 2025-02-19 NOTE — TELEPHONE ENCOUNTER
Received phone call from Arti with [facility] in regards to     VERBAL orders: SN, PT, OT        They changed some of his DM medications and dont want to adjust anything until he speaks with PCP on 2/20/205 they took away metformin and added more insulin and decrease BP mediation but he does not want to adjust any of this until speaking with PCP    Arti- [phone]

## 2025-02-20 ENCOUNTER — OFFICE VISIT (OUTPATIENT)
Dept: FAMILY MEDICINE CLINIC | Age: 70
End: 2025-02-20

## 2025-02-20 VITALS
HEIGHT: 72 IN | BODY MASS INDEX: 27.9 KG/M2 | WEIGHT: 206 LBS | OXYGEN SATURATION: 99 % | SYSTOLIC BLOOD PRESSURE: 134 MMHG | HEART RATE: 92 BPM | DIASTOLIC BLOOD PRESSURE: 80 MMHG

## 2025-02-20 DIAGNOSIS — E11.42 DIABETIC PERIPHERAL NEUROPATHY (HCC): ICD-10-CM

## 2025-02-20 DIAGNOSIS — M54.12 CERVICAL RADICULOPATHY: ICD-10-CM

## 2025-02-20 DIAGNOSIS — Z09 HOSPITAL DISCHARGE FOLLOW-UP: ICD-10-CM

## 2025-02-20 DIAGNOSIS — E11.59 HYPERTENSION ASSOCIATED WITH DIABETES (HCC): ICD-10-CM

## 2025-02-20 DIAGNOSIS — I15.2 HYPERTENSION ASSOCIATED WITH DIABETES (HCC): ICD-10-CM

## 2025-02-20 DIAGNOSIS — E11.65 TYPE 2 DIABETES MELLITUS WITH HYPERGLYCEMIA, WITHOUT LONG-TERM CURRENT USE OF INSULIN (HCC): Primary | ICD-10-CM

## 2025-02-20 DIAGNOSIS — E11.65 TYPE 2 DIABETES MELLITUS WITH HYPERGLYCEMIA, WITHOUT LONG-TERM CURRENT USE OF INSULIN (HCC): ICD-10-CM

## 2025-02-20 LAB
ALBUMIN SERPL-MCNC: 4.5 G/DL (ref 3.4–5)
ANION GAP SERPL CALCULATED.3IONS-SCNC: 14 MMOL/L (ref 3–16)
BUN SERPL-MCNC: 17 MG/DL (ref 7–20)
CALCIUM SERPL-MCNC: 10.7 MG/DL (ref 8.3–10.6)
CHLORIDE SERPL-SCNC: 103 MMOL/L (ref 99–110)
CO2 SERPL-SCNC: 25 MMOL/L (ref 21–32)
CREAT SERPL-MCNC: 0.8 MG/DL (ref 0.8–1.3)
GFR SERPLBLD CREATININE-BSD FMLA CKD-EPI: >90 ML/MIN/{1.73_M2}
GLUCOSE SERPL-MCNC: 136 MG/DL (ref 70–99)
PHOSPHATE SERPL-MCNC: 4.1 MG/DL (ref 2.5–4.9)
POTASSIUM SERPL-SCNC: 4.8 MMOL/L (ref 3.5–5.1)
SODIUM SERPL-SCNC: 142 MMOL/L (ref 136–145)

## 2025-02-20 RX ORDER — OXYCODONE HYDROCHLORIDE 5 MG/1
TABLET ORAL
COMMUNITY
Start: 2025-02-14

## 2025-02-20 RX ORDER — ACETAMINOPHEN 500 MG
1000 TABLET ORAL 3 TIMES DAILY
Qty: 180 TABLET | Refills: 1 | Status: SHIPPED | OUTPATIENT
Start: 2025-02-20

## 2025-02-20 RX ORDER — METHOCARBAMOL 500 MG/1
1000 TABLET, FILM COATED ORAL EVERY 8 HOURS
COMMUNITY
Start: 2025-02-03

## 2025-02-20 SDOH — ECONOMIC STABILITY: FOOD INSECURITY: WITHIN THE PAST 12 MONTHS, THE FOOD YOU BOUGHT JUST DIDN'T LAST AND YOU DIDN'T HAVE MONEY TO GET MORE.: NEVER TRUE

## 2025-02-20 SDOH — ECONOMIC STABILITY: FOOD INSECURITY: WITHIN THE PAST 12 MONTHS, YOU WORRIED THAT YOUR FOOD WOULD RUN OUT BEFORE YOU GOT MONEY TO BUY MORE.: NEVER TRUE

## 2025-02-20 ASSESSMENT — PATIENT HEALTH QUESTIONNAIRE - PHQ9
1. LITTLE INTEREST OR PLEASURE IN DOING THINGS: SEVERAL DAYS
SUM OF ALL RESPONSES TO PHQ QUESTIONS 1-9: 2
SUM OF ALL RESPONSES TO PHQ QUESTIONS 1-9: 2
SUM OF ALL RESPONSES TO PHQ9 QUESTIONS 1 & 2: 2
2. FEELING DOWN, DEPRESSED OR HOPELESS: SEVERAL DAYS
SUM OF ALL RESPONSES TO PHQ QUESTIONS 1-9: 2
SUM OF ALL RESPONSES TO PHQ QUESTIONS 1-9: 2

## 2025-02-20 NOTE — PROGRESS NOTES
as well    Dm2  Glipizide, metformin lantus 27u prior to admission  Hyperglycemia during admission due to steroid use  Discharged with additional lispro 3U TID w/m  While at rehab they stopped metformin and increased lantus to 30 units.   Empaglaflozin 25mg - cost prohibitive  Dapaglaflozin - cost prohibitive    Reports an episode while at rehab where he had sweating   Patient Active Problem List   Diagnosis    Type 2 diabetes mellitus with hyperglycemia, without long-term current use of insulin (HCC)    Hypertension associated with diabetes (HCC)    Hyperlipidemia associated with type 2 diabetes mellitus (HCC)    Prepatellar abscess    Sensorineural hearing loss, bilateral    History of tooth extraction, unspecified edentulism    High anion gap metabolic acidosis    Microalbuminuria due to type 2 diabetes mellitus (HCC)    Diabetic peripheral neuropathy (HCC)       Medications listed as ordered at the time of discharge from hospital     Medication List            Accurate as of February 20, 2025 11:59 PM. If you have any questions, ask your nurse or doctor.                START taking these medications      acetaminophen 500 MG tablet  Commonly known as: TYLENOL  Take 2 tablets by mouth 3 times daily  Started by: Dr. Yanick De La Vega, DO            CHANGE how you take these medications      Lantus SoloStar 100 UNIT/ML injection pen  Generic drug: insulin glargine  Inject 25 Units into the skin nightly  What changed:   how much to take  when to take this            CONTINUE taking these medications      aspirin 81 MG EC tablet     CINNAMON PO     Farxiga 5 MG tablet  Generic drug: dapagliflozin  TAKE 1 TABLET BY MOUTH EVERY  MORNING     fish oil 1000 MG capsule     FreeStyle Darion 3 Sensor Misc  1 each by Does not apply route every 14 days     glipiZIDE 10 MG tablet  Commonly known as: GLUCOTROL  Take 1 tablet by mouth 2 times daily     lisinopril 30 MG tablet  Commonly known as: PRINIVIL;ZESTRIL  Take 1 tablet by

## 2025-03-21 ENCOUNTER — TELEPHONE (OUTPATIENT)
Dept: FAMILY MEDICINE CLINIC | Age: 70
End: 2025-03-21

## 2025-03-21 NOTE — TELEPHONE ENCOUNTER
Received Lantus from PAP. Called patient and informed, will  on Monday while in the office for OV.

## 2025-03-24 ENCOUNTER — OFFICE VISIT (OUTPATIENT)
Dept: FAMILY MEDICINE CLINIC | Age: 70
End: 2025-03-24
Payer: MEDICARE

## 2025-03-24 VITALS
SYSTOLIC BLOOD PRESSURE: 126 MMHG | WEIGHT: 212 LBS | HEIGHT: 72 IN | HEART RATE: 109 BPM | BODY MASS INDEX: 28.71 KG/M2 | DIASTOLIC BLOOD PRESSURE: 72 MMHG | TEMPERATURE: 97.7 F | OXYGEN SATURATION: 95 %

## 2025-03-24 DIAGNOSIS — E83.52 HYPERCALCEMIA: ICD-10-CM

## 2025-03-24 DIAGNOSIS — Z79.4 TYPE 2 DIABETES MELLITUS WITH HYPERGLYCEMIA, WITH LONG-TERM CURRENT USE OF INSULIN (HCC): Primary | ICD-10-CM

## 2025-03-24 DIAGNOSIS — E11.29 MICROALBUMINURIA DUE TO TYPE 2 DIABETES MELLITUS (HCC): ICD-10-CM

## 2025-03-24 DIAGNOSIS — I15.2 HYPERTENSION ASSOCIATED WITH DIABETES: ICD-10-CM

## 2025-03-24 DIAGNOSIS — E78.5 HYPERLIPIDEMIA ASSOCIATED WITH TYPE 2 DIABETES MELLITUS: ICD-10-CM

## 2025-03-24 DIAGNOSIS — Z79.4 TYPE 2 DIABETES MELLITUS WITH HYPERGLYCEMIA, WITH LONG-TERM CURRENT USE OF INSULIN (HCC): ICD-10-CM

## 2025-03-24 DIAGNOSIS — E11.65 TYPE 2 DIABETES MELLITUS WITH HYPERGLYCEMIA, WITH LONG-TERM CURRENT USE OF INSULIN (HCC): ICD-10-CM

## 2025-03-24 DIAGNOSIS — E11.69 HYPERLIPIDEMIA ASSOCIATED WITH TYPE 2 DIABETES MELLITUS: ICD-10-CM

## 2025-03-24 DIAGNOSIS — E11.59 HYPERTENSION ASSOCIATED WITH DIABETES: ICD-10-CM

## 2025-03-24 DIAGNOSIS — R80.9 MICROALBUMINURIA DUE TO TYPE 2 DIABETES MELLITUS (HCC): ICD-10-CM

## 2025-03-24 DIAGNOSIS — E11.65 TYPE 2 DIABETES MELLITUS WITH HYPERGLYCEMIA, WITHOUT LONG-TERM CURRENT USE OF INSULIN (HCC): ICD-10-CM

## 2025-03-24 DIAGNOSIS — E11.65 TYPE 2 DIABETES MELLITUS WITH HYPERGLYCEMIA, WITH LONG-TERM CURRENT USE OF INSULIN (HCC): Primary | ICD-10-CM

## 2025-03-24 LAB
ALBUMIN SERPL-MCNC: 4.5 G/DL (ref 3.4–5)
ANION GAP SERPL CALCULATED.3IONS-SCNC: 11 MMOL/L (ref 3–16)
BUN SERPL-MCNC: 18 MG/DL (ref 7–20)
CALCIUM SERPL-MCNC: 10.6 MG/DL (ref 8.3–10.6)
CHLORIDE SERPL-SCNC: 102 MMOL/L (ref 99–110)
CHOLEST SERPL-MCNC: 240 MG/DL (ref 0–199)
CO2 SERPL-SCNC: 27 MMOL/L (ref 21–32)
CREAT SERPL-MCNC: 0.7 MG/DL (ref 0.8–1.3)
GFR SERPLBLD CREATININE-BSD FMLA CKD-EPI: >90 ML/MIN/{1.73_M2}
GLUCOSE SERPL-MCNC: 223 MG/DL (ref 70–99)
HBA1C MFR BLD: 8.7 %
HDLC SERPL-MCNC: 40 MG/DL (ref 40–60)
LDLC SERPL CALC-MCNC: 146 MG/DL
PHOSPHATE SERPL-MCNC: 3.5 MG/DL (ref 2.5–4.9)
POTASSIUM SERPL-SCNC: 4.6 MMOL/L (ref 3.5–5.1)
SODIUM SERPL-SCNC: 140 MMOL/L (ref 136–145)
TRIGL SERPL-MCNC: 271 MG/DL (ref 0–150)
VLDLC SERPL CALC-MCNC: 54 MG/DL

## 2025-03-24 PROCEDURE — 3078F DIAST BP <80 MM HG: CPT | Performed by: STUDENT IN AN ORGANIZED HEALTH CARE EDUCATION/TRAINING PROGRAM

## 2025-03-24 PROCEDURE — G8419 CALC BMI OUT NRM PARAM NOF/U: HCPCS | Performed by: STUDENT IN AN ORGANIZED HEALTH CARE EDUCATION/TRAINING PROGRAM

## 2025-03-24 PROCEDURE — G2211 COMPLEX E/M VISIT ADD ON: HCPCS | Performed by: STUDENT IN AN ORGANIZED HEALTH CARE EDUCATION/TRAINING PROGRAM

## 2025-03-24 PROCEDURE — G8427 DOCREV CUR MEDS BY ELIG CLIN: HCPCS | Performed by: STUDENT IN AN ORGANIZED HEALTH CARE EDUCATION/TRAINING PROGRAM

## 2025-03-24 PROCEDURE — 3074F SYST BP LT 130 MM HG: CPT | Performed by: STUDENT IN AN ORGANIZED HEALTH CARE EDUCATION/TRAINING PROGRAM

## 2025-03-24 PROCEDURE — 1123F ACP DISCUSS/DSCN MKR DOCD: CPT | Performed by: STUDENT IN AN ORGANIZED HEALTH CARE EDUCATION/TRAINING PROGRAM

## 2025-03-24 PROCEDURE — 1036F TOBACCO NON-USER: CPT | Performed by: STUDENT IN AN ORGANIZED HEALTH CARE EDUCATION/TRAINING PROGRAM

## 2025-03-24 PROCEDURE — 83036 HEMOGLOBIN GLYCOSYLATED A1C: CPT | Performed by: STUDENT IN AN ORGANIZED HEALTH CARE EDUCATION/TRAINING PROGRAM

## 2025-03-24 PROCEDURE — 3017F COLORECTAL CA SCREEN DOC REV: CPT | Performed by: STUDENT IN AN ORGANIZED HEALTH CARE EDUCATION/TRAINING PROGRAM

## 2025-03-24 PROCEDURE — 2022F DILAT RTA XM EVC RTNOPTHY: CPT | Performed by: STUDENT IN AN ORGANIZED HEALTH CARE EDUCATION/TRAINING PROGRAM

## 2025-03-24 PROCEDURE — 3052F HG A1C>EQUAL 8.0%<EQUAL 9.0%: CPT | Performed by: STUDENT IN AN ORGANIZED HEALTH CARE EDUCATION/TRAINING PROGRAM

## 2025-03-24 PROCEDURE — 1159F MED LIST DOCD IN RCRD: CPT | Performed by: STUDENT IN AN ORGANIZED HEALTH CARE EDUCATION/TRAINING PROGRAM

## 2025-03-24 PROCEDURE — 99214 OFFICE O/P EST MOD 30 MIN: CPT | Performed by: STUDENT IN AN ORGANIZED HEALTH CARE EDUCATION/TRAINING PROGRAM

## 2025-03-24 RX ORDER — AMOXICILLIN 250 MG
1 CAPSULE ORAL 2 TIMES DAILY
COMMUNITY
Start: 2025-02-03 | End: 2025-03-27 | Stop reason: ALTCHOICE

## 2025-03-24 RX ORDER — LIDOCAINE 50 MG/G
1 PATCH TOPICAL DAILY
COMMUNITY
Start: 2025-02-25 | End: 2025-03-27 | Stop reason: ALTCHOICE

## 2025-03-24 RX ORDER — INSULIN GLARGINE 100 [IU]/ML
30 INJECTION, SOLUTION SUBCUTANEOUS NIGHTLY
Qty: 15 ADJUSTABLE DOSE PRE-FILLED PEN SYRINGE | Refills: 1 | Status: SHIPPED | OUTPATIENT
Start: 2025-03-24

## 2025-03-24 RX ORDER — POLYETHYLENE GLYCOL 3350 17 G/17G
17 POWDER, FOR SOLUTION ORAL DAILY
COMMUNITY
Start: 2025-02-03 | End: 2025-03-27 | Stop reason: ALTCHOICE

## 2025-03-24 NOTE — PROGRESS NOTES
Urine micro collected and  sent to the lab for testing.   Labeled and placed in bag with orders.   (ALL URINE CX TO BE PLACED IN THE FRIDGE)

## 2025-03-24 NOTE — PROGRESS NOTES
Assessment:  Encounter Diagnoses   Name Primary?    Type 2 diabetes mellitus with hyperglycemia, with long-term current use of insulin ([facility]) Yes    Hypercalcemia     Hypertension associated with diabetes (HCC)     Microalbuminuria due to type 2 diabetes mellitus (HCC)     Hyperlipidemia associated with type 2 diabetes mellitus (HCC)     Type 2 diabetes mellitus with hyperglycemia, without long-term current use of insulin ([facility])        Assessment & Plan  1. Diabetes mellitus:  - Blood glucose levels consistently elevated, spiking every morning.  - Levels within  range only 11% of the time, high 80% of the time.  - Reduce cream of wheat and oats, opt for protein-rich foods like eggs or low-sugar yogurt, and use low-carb toast.  - Increase insulin dosage from 27 units to 30 units.  - Contact office in 2 weeks to report blood glucose levels. If high, add 2 more units of insulin.    2. Pain management:  - Persistent pain, avoiding pain medication.  - Use Tylenol routinely, up to 1000 mg three times a day.  - Discuss pain management with therapist and surgeon.  - Alternate heating pad and ice pack.    3. Sleep disturbance:  - Difficulty sleeping due to pain.  - Take melatonin 3-5 mg 30 minutes before bedtime.  - If effective, use long-term as it is safe and non-addictive.  - Maintain consistent sleep schedule and avoid disruptive activities.    4. Medication management:  - Try Crestor (rosuvastatin) to reduce risk of heart attack and stroke, given history of uncontrolled diabetes.  - Consider alternative medications if side effects occur.  - Farxiga cost is a concern, long-term use may not be feasible.  - Monitor and adjust medication regimen based on response and side effects.    Plan:  1. Type 2 diabetes mellitus with hyperglycemia, with long-term current use of insulin ([facility])  -     POCT glycosylated hemoglobin (Hb A1C)  -     Renal Function Panel; Future  -     Albumin/Creatinine Ratio, Urine  2. Hypercalcemia  -

## 2025-03-25 LAB
CREAT UR-MCNC: 71.8 MG/DL (ref 39–259)
EST. AVERAGE GLUCOSE BLD GHB EST-MCNC: 197.3 MG/DL
HBA1C MFR BLD: 8.5 %
MICROALBUMIN UR DL<=1MG/L-MCNC: 27.9 MG/DL
MICROALBUMIN/CREAT UR: 388.6 MG/G (ref 0–30)

## 2025-03-26 ENCOUNTER — RESULTS FOLLOW-UP (OUTPATIENT)
Dept: FAMILY MEDICINE CLINIC | Age: 70
End: 2025-03-26

## 2025-03-26 NOTE — PROGRESS NOTES
Clinical Pharmacy Note    Bipin Caballero is a 69 y.o. male who has been referred by Yanick De La Vega DO to the clinical pharmacist through the diabetes maris for medication management. Bipin Caballero was seen today for a follow-up diabetes management visit. Patient acknowledges working in consult agreement with clinical pharmacist and this provider.    Past Medical History:   Diagnosis Date    Allergic rhinitis     Diabetes mellitus (HCC)     Dizziness     Hearing loss     Hyperlipidemia     Hypertension       Family History   Problem Relation Age of Onset    Cancer Mother         breast    Celiac Disease Daughter     Hashimoto Thyroiditis Daughter      Patient insurance: [facility] medicare   Patient's preferred pharmacy: [facility] mail order     REVIEW OF DIABETES & LIFESTYLE  Bipin Caballero is here for an evaluation of their type II diabetes mellitus. The patient was diagnosed in his 40s. Reviewed the signs and symptoms of hypoglycemia with patient and how to treat hypoglycemic episodes at initial pharmD visit on 7/18/24. The patient was last seen by the pharmacist on 10/24/25. At last visit no medication changes were made to DM regimen.     Interval history since last pharmD visit:  Follow-ups with Dr. De La Vega  3/24/25 - Blood glucose levels consistently elevated, spiking every morning - 11% time in range. Increase Lantus to 30 units nightly.   2/20/25 - post-op follow-up, was discharged on lispro 3 units three times daily with meals, increased Lantus and stopped metformin. Jardiance and Farxiga not affordable.   10/30/24 - add Farxiga and rosuvastatin   The patient has not had any ED visits or hospitalizations due to diabetes since last pharmacist visit.   Had spinal fusion at  1/31/25    Today he reports no signs or symptoms of hypo- or hyperglycemia.      The patient does monitor their blood sugar at home using FreeStyle Darion CGM. He uses the FreeStyle reader to check glucose. Did not have reader with

## 2025-03-27 ENCOUNTER — PHARMACY VISIT (OUTPATIENT)
Dept: FAMILY MEDICINE CLINIC | Age: 70
End: 2025-03-27

## 2025-03-27 DIAGNOSIS — E11.65 TYPE 2 DIABETES MELLITUS WITH HYPERGLYCEMIA, WITH LONG-TERM CURRENT USE OF INSULIN (HCC): Primary | ICD-10-CM

## 2025-03-27 DIAGNOSIS — Z79.4 TYPE 2 DIABETES MELLITUS WITH HYPERGLYCEMIA, WITH LONG-TERM CURRENT USE OF INSULIN (HCC): Primary | ICD-10-CM

## 2025-03-30 DIAGNOSIS — I15.2 HYPERTENSION ASSOCIATED WITH DIABETES: ICD-10-CM

## 2025-03-30 DIAGNOSIS — E11.65 TYPE 2 DIABETES MELLITUS WITH HYPERGLYCEMIA, WITHOUT LONG-TERM CURRENT USE OF INSULIN (HCC): ICD-10-CM

## 2025-03-30 DIAGNOSIS — E11.59 HYPERTENSION ASSOCIATED WITH DIABETES: ICD-10-CM

## 2025-03-31 RX ORDER — LISINOPRIL 30 MG/1
30 TABLET ORAL DAILY
Qty: 90 TABLET | Refills: 3 | Status: SHIPPED | OUTPATIENT
Start: 2025-03-31

## 2025-03-31 RX ORDER — GLIPIZIDE 10 MG/1
10 TABLET ORAL 2 TIMES DAILY
Qty: 180 TABLET | Refills: 3 | Status: SHIPPED | OUTPATIENT
Start: 2025-03-31

## 2025-03-31 NOTE — TELEPHONE ENCOUNTER
Refill Request     CONFIRM preferred pharmacy with the patient.    If Mail Order Rx - Pend for 90 day refill.      Last Seen: Last Seen Department: 3/27/2025  Last Seen by PCP: 3/24/2025    Last Written:   Metformin-5/23/2024 180 tab 1 refills  Lisinopril-5/23/2024 90 tab 1 refills  Glipizide-6/21/2024 180 tab 1 refills    If no future appointment scheduled:  Review the last OV with PCP and review information for follow-up visit,  Route STAFF MESSAGE with patient name to the  Pool for scheduling with the following information:            -  Timing of next visit           -  Visit type ie Physical, OV, etc           -  Diagnoses/Reason ie. COPD, HTN - Do not use MEDICATION, Follow-up or CHECK UP - Give reason for visit      Next Appointment:   Future Appointments   Date Time Provider Department Center   4/14/2025 10:00 AM DIABETES EDUCATION, [facility] DEP   6/26/2025  9:45 AM Carine Lovett DO [facility]   7/31/2025  2:00 PM EFM - PHARMD-DIABETES [facility] DEP       Message sent to  to schedule appt with patient?  NO      Requested Prescriptions     Pending Prescriptions Disp Refills    metFORMIN (GLUCOPHAGE) 1000 MG tablet [Pharmacy Med Name: metFORMIN HCl 1000 MG Oral Tablet] 180 tablet 3     Sig: TAKE 1 TABLET BY MOUTH TWICE  DAILY WITH MEALS    lisinopril (PRINIVIL;ZESTRIL) 30 MG tablet [Pharmacy Med Name: Lisinopril 30 MG Oral Tablet] 90 tablet 3     Sig: TAKE 1 TABLET BY MOUTH DAILY    glipiZIDE (GLUCOTROL) 10 MG tablet [Pharmacy Med Name: glipiZIDE 10 MG Oral Tablet] 180 tablet 3     Sig: TAKE 1 TABLET BY MOUTH TWICE  DAILY

## 2025-06-25 ENCOUNTER — TELEPHONE (OUTPATIENT)
Dept: FAMILY MEDICINE CLINIC | Age: 70
End: 2025-06-25

## 2025-06-25 NOTE — TELEPHONE ENCOUNTER
We received Lantus (2) boxes today via [employer] PAP for patient. It arrived warm per Joby at  when he brought it back. When I put the Lantus away they were not very warm, but not cold either. I asked Ashley what we should do and she suggested I talk with the Nurse team for recommendations. I did speak with Rebeka and she is going to reach out to [employer] and find out what they suggest be done with the medication. The patient hasn't been notified that we received it yet. Rosalio and Rebeka, copying you both on this. Thanks!

## 2025-06-25 NOTE — TELEPHONE ENCOUNTER
Nurse spoke with the rep. At [facility].  [facility] advised that they can not find tracking for the shipment to confirm wether the box was a warm ship.  Rep stated that they would reship the order for the lantus.  Nurse noted, new shipment may be delayed due to holiday.

## 2025-06-26 ENCOUNTER — OFFICE VISIT (OUTPATIENT)
Dept: FAMILY MEDICINE CLINIC | Age: 70
End: 2025-06-26
Payer: MEDICARE

## 2025-06-26 VITALS
OXYGEN SATURATION: 96 % | DIASTOLIC BLOOD PRESSURE: 70 MMHG | HEART RATE: 130 BPM | HEIGHT: 72 IN | BODY MASS INDEX: 28.99 KG/M2 | SYSTOLIC BLOOD PRESSURE: 120 MMHG | WEIGHT: 214 LBS

## 2025-06-26 DIAGNOSIS — I15.2 HYPERTENSION ASSOCIATED WITH DIABETES (HCC): ICD-10-CM

## 2025-06-26 DIAGNOSIS — E11.69 HYPERLIPIDEMIA ASSOCIATED WITH TYPE 2 DIABETES MELLITUS (HCC): ICD-10-CM

## 2025-06-26 DIAGNOSIS — E78.5 HYPERLIPIDEMIA ASSOCIATED WITH TYPE 2 DIABETES MELLITUS (HCC): ICD-10-CM

## 2025-06-26 DIAGNOSIS — E11.65 TYPE 2 DIABETES MELLITUS WITH HYPERGLYCEMIA, WITH LONG-TERM CURRENT USE OF INSULIN (HCC): Primary | ICD-10-CM

## 2025-06-26 DIAGNOSIS — E11.59 HYPERTENSION ASSOCIATED WITH DIABETES (HCC): ICD-10-CM

## 2025-06-26 DIAGNOSIS — E11.29 MICROALBUMINURIA DUE TO TYPE 2 DIABETES MELLITUS (HCC): ICD-10-CM

## 2025-06-26 DIAGNOSIS — Z79.4 TYPE 2 DIABETES MELLITUS WITH HYPERGLYCEMIA, WITH LONG-TERM CURRENT USE OF INSULIN (HCC): Primary | ICD-10-CM

## 2025-06-26 DIAGNOSIS — R80.9 MICROALBUMINURIA DUE TO TYPE 2 DIABETES MELLITUS (HCC): ICD-10-CM

## 2025-06-26 LAB — HBA1C MFR BLD: 10.1 %

## 2025-06-26 PROCEDURE — 3078F DIAST BP <80 MM HG: CPT | Performed by: FAMILY MEDICINE

## 2025-06-26 PROCEDURE — G8427 DOCREV CUR MEDS BY ELIG CLIN: HCPCS | Performed by: FAMILY MEDICINE

## 2025-06-26 PROCEDURE — 3017F COLORECTAL CA SCREEN DOC REV: CPT | Performed by: FAMILY MEDICINE

## 2025-06-26 PROCEDURE — G8419 CALC BMI OUT NRM PARAM NOF/U: HCPCS | Performed by: FAMILY MEDICINE

## 2025-06-26 PROCEDURE — 2022F DILAT RTA XM EVC RTNOPTHY: CPT | Performed by: FAMILY MEDICINE

## 2025-06-26 PROCEDURE — 83036 HEMOGLOBIN GLYCOSYLATED A1C: CPT | Performed by: FAMILY MEDICINE

## 2025-06-26 PROCEDURE — 1123F ACP DISCUSS/DSCN MKR DOCD: CPT | Performed by: FAMILY MEDICINE

## 2025-06-26 PROCEDURE — 99214 OFFICE O/P EST MOD 30 MIN: CPT | Performed by: FAMILY MEDICINE

## 2025-06-26 PROCEDURE — 3074F SYST BP LT 130 MM HG: CPT | Performed by: FAMILY MEDICINE

## 2025-06-26 PROCEDURE — 1159F MED LIST DOCD IN RCRD: CPT | Performed by: FAMILY MEDICINE

## 2025-06-26 PROCEDURE — 3046F HEMOGLOBIN A1C LEVEL >9.0%: CPT | Performed by: FAMILY MEDICINE

## 2025-06-26 PROCEDURE — 1036F TOBACCO NON-USER: CPT | Performed by: FAMILY MEDICINE

## 2025-06-26 PROCEDURE — G2211 COMPLEX E/M VISIT ADD ON: HCPCS | Performed by: FAMILY MEDICINE

## 2025-06-26 NOTE — PROGRESS NOTES
Affect: Mood normal.         Behavior: Behavior normal.         Thought Content: Thought content normal.         Current Outpatient Medications   Medication Sig Dispense Refill    metFORMIN (GLUCOPHAGE) 1000 MG tablet TAKE 1 TABLET BY MOUTH TWICE  DAILY WITH MEALS 180 tablet 3    lisinopril (PRINIVIL;ZESTRIL) 30 MG tablet TAKE 1 TABLET BY MOUTH DAILY 90 tablet 3    glipiZIDE (GLUCOTROL) 10 MG tablet TAKE 1 TABLET BY MOUTH TWICE  DAILY 180 tablet 3    insulin glargine (LANTUS SOLOSTAR) 100 UNIT/ML injection pen Inject 30 Units into the skin nightly 15 Adjustable Dose Pre-filled Pen Syringe 1    methocarbamol (ROBAXIN) 500 MG tablet Take 2 tablets by mouth in the morning and 2 tablets at noon and 2 tablets in the evening.      acetaminophen (TYLENOL) 500 MG tablet Take 2 tablets by mouth 3 times daily 180 tablet 1    rosuvastatin (CRESTOR) 10 MG tablet Take 1 tablet by mouth daily 90 tablet 1    ULTICARE MINI PEN NEEDLES 31G X 6 MM MISC Use one needle daily to administer lantus solostar. 100 each 3    Continuous Glucose Sensor (FREESTYLE RAMU 3 SENSOR) MISC 1 each by Does not apply route every 14 days 6 each 1    Omega-3 Fatty Acids (FISH OIL) 1000 MG CAPS Take 1 capsule by mouth in the morning and at bedtime      aspirin 81 MG EC tablet Take 1 tablet by mouth daily      CINNAMON PO Take 1,000 mg by mouth 2 times daily.        Multiple Vitamin (MULTI-VITAMIN PO) Take  by mouth.         No current facility-administered medications for this visit.       Assessment:    1. Type 2 diabetes mellitus with hyperglycemia, with long-term current use of insulin ([facility])    2. Hypertension associated with diabetes ([facility])    3. Hyperlipidemia associated with type 2 diabetes mellitus ([facility])    4. Microalbuminuria due to type 2 diabetes mellitus ([facility])        Plan:    1. Type 2 diabetes mellitus with hyperglycemia, with long-term current use of insulin ([facility])  A1c is worsening to 10.1.  Discussed continued insulin titration to achieve

## 2025-07-03 ENCOUNTER — TELEPHONE (OUTPATIENT)
Dept: FAMILY MEDICINE CLINIC | Age: 70
End: 2025-07-03

## 2025-07-03 NOTE — TELEPHONE ENCOUNTER
Nurse called the patient to let him know that we received his patient assistance medication and that it is ready to .  Patient did not answer, left message for the patient to return the call to the office.

## 2025-07-03 NOTE — TELEPHONE ENCOUNTER
Received patients PAP for Lantus 7/2/2025 after 5PM from [employer]. Packing slip scanned into chart.     Left message for patient to call back to see when he is able to  his PAP for Lantus.

## 2025-07-03 NOTE — TELEPHONE ENCOUNTER
Patient's wife came into the office and picked up the patient assistance medication.  Nurse asked the patient's wife if she had any questions, she stated no.  Nurse advised the patient's wife if they had any questions or concerns to call the office.

## 2025-07-03 NOTE — TELEPHONE ENCOUNTER
Patient returned phone call and stated that his spouse lidia cespedes per HIPAA will stop by to receive this medication.

## 2025-07-30 NOTE — PROGRESS NOTES
Clinical Pharmacy Note    Bipin Caballero is a 70 y.o. male who has been referred by Yanick De La Vega DO to the clinical pharmacist through the diabetes maris for medication management. Bipin Caballero was seen today for a follow-up diabetes management visit. Patient acknowledges working in consult agreement with clinical pharmacist and this provider.    Past Medical History:   Diagnosis Date    Allergic rhinitis     Diabetes mellitus (HCC)     Dizziness     Hearing loss     Hyperlipidemia     Hypertension       Family History   Problem Relation Age of Onset    Cancer Mother         breast    Celiac Disease Daughter     Hashimoto Thyroiditis Daughter      Patient insurance: [facility] medicare   Patient's preferred pharmacy: [facility] mail order     REVIEW OF DIABETES & LIFESTYLE  Bipin Caballero is here for an evaluation of their type II diabetes mellitus. The patient was diagnosed in his 40s. Reviewed signs and symptoms of hypoglycemia with patient and how to treat hypoglycemic episodes at initial pharmD visit on 7/18/24. The patient was last seen by the pharmacist on 3/27/25. At last visit patient completed paperwork for [employer] to see if eligible to receive Farxiga via PAP. It dos not look like paperwork was submitted to [employer].      Interval history since last pharmD visit:  Last visit with Dr. Lovett: 6/26/25  A1c worsened to 10.1%. Discussed insulin titration to achieve fasting glucose of 150mg/dL. Consider Mounjaro in the future. Return in 3 months.   The patient has not had any ED visits or hospitalizations due to diabetes since last pharmacist visit.     Today he reports no signs or symptoms of hypo- or hyperglycemia.      The patient does monitor their blood sugar at home using FreeStyle Darion CGM. He uses the FreeStyle reader to check glucose. Since visit today was virtual, was unable to upload and review Darion data. He reports that he has been taking 36 units of Lantus and his AM readings are in the 130s

## 2025-07-31 ENCOUNTER — PHARMACY VISIT (OUTPATIENT)
Dept: FAMILY MEDICINE CLINIC | Age: 70
End: 2025-07-31

## 2025-07-31 DIAGNOSIS — E11.65 TYPE 2 DIABETES MELLITUS WITH HYPERGLYCEMIA, WITH LONG-TERM CURRENT USE OF INSULIN (HCC): Primary | ICD-10-CM

## 2025-07-31 DIAGNOSIS — Z79.4 TYPE 2 DIABETES MELLITUS WITH HYPERGLYCEMIA, WITH LONG-TERM CURRENT USE OF INSULIN (HCC): Primary | ICD-10-CM

## 2025-07-31 RX ORDER — LIDOCAINE 4 G/G
1 PATCH TOPICAL EVERY 24 HOURS
COMMUNITY
Start: 2025-05-14